# Patient Record
Sex: MALE | Race: WHITE | NOT HISPANIC OR LATINO | Employment: OTHER | URBAN - METROPOLITAN AREA
[De-identification: names, ages, dates, MRNs, and addresses within clinical notes are randomized per-mention and may not be internally consistent; named-entity substitution may affect disease eponyms.]

---

## 2017-01-13 ENCOUNTER — ALLSCRIPTS OFFICE VISIT (OUTPATIENT)
Dept: OTHER | Facility: OTHER | Age: 57
End: 2017-01-13

## 2017-01-13 DIAGNOSIS — E66.9 OBESITY: ICD-10-CM

## 2017-02-20 ENCOUNTER — ALLSCRIPTS OFFICE VISIT (OUTPATIENT)
Dept: OTHER | Facility: OTHER | Age: 57
End: 2017-02-20

## 2017-02-20 DIAGNOSIS — M10.9 GOUT: ICD-10-CM

## 2017-04-03 DIAGNOSIS — M10.9 GOUT: ICD-10-CM

## 2017-04-03 DIAGNOSIS — Z12.11 ENCOUNTER FOR SCREENING FOR MALIGNANT NEOPLASM OF COLON: ICD-10-CM

## 2017-04-12 ENCOUNTER — APPOINTMENT (OUTPATIENT)
Dept: LAB | Facility: CLINIC | Age: 57
End: 2017-04-12
Payer: COMMERCIAL

## 2017-04-12 DIAGNOSIS — M10.9 GOUT: ICD-10-CM

## 2017-04-12 LAB
ANION GAP SERPL CALCULATED.3IONS-SCNC: 6 MMOL/L (ref 4–13)
BUN SERPL-MCNC: 22 MG/DL (ref 5–25)
CALCIUM SERPL-MCNC: 9.2 MG/DL (ref 8.3–10.1)
CHLORIDE SERPL-SCNC: 111 MMOL/L (ref 100–108)
CO2 SERPL-SCNC: 29 MMOL/L (ref 21–32)
CREAT SERPL-MCNC: 1.2 MG/DL (ref 0.6–1.3)
GFR SERPL CREATININE-BSD FRML MDRD: >60 ML/MIN/1.73SQ M
GLUCOSE SERPL-MCNC: 92 MG/DL (ref 65–140)
POTASSIUM SERPL-SCNC: 3.8 MMOL/L (ref 3.5–5.3)
SODIUM SERPL-SCNC: 146 MMOL/L (ref 136–145)
URATE SERPL-MCNC: 5.4 MG/DL (ref 4.2–8)

## 2017-04-12 PROCEDURE — 80048 BASIC METABOLIC PNL TOTAL CA: CPT

## 2017-04-12 PROCEDURE — 36415 COLL VENOUS BLD VENIPUNCTURE: CPT

## 2017-04-12 PROCEDURE — 84550 ASSAY OF BLOOD/URIC ACID: CPT

## 2017-04-20 ENCOUNTER — ALLSCRIPTS OFFICE VISIT (OUTPATIENT)
Dept: OTHER | Facility: OTHER | Age: 57
End: 2017-04-20

## 2017-04-20 ENCOUNTER — APPOINTMENT (OUTPATIENT)
Dept: LAB | Facility: CLINIC | Age: 57
End: 2017-04-20
Payer: COMMERCIAL

## 2017-04-20 DIAGNOSIS — E66.9 OBESITY: ICD-10-CM

## 2017-04-20 LAB
CHOLEST SERPL-MCNC: 136 MG/DL (ref 50–200)
HDLC SERPL-MCNC: 39 MG/DL (ref 40–60)
LDLC SERPL CALC-MCNC: 80 MG/DL (ref 0–100)
TRIGL SERPL-MCNC: 86 MG/DL

## 2017-04-20 PROCEDURE — 80061 LIPID PANEL: CPT

## 2017-04-20 PROCEDURE — 36415 COLL VENOUS BLD VENIPUNCTURE: CPT

## 2017-04-24 ENCOUNTER — GENERIC CONVERSION - ENCOUNTER (OUTPATIENT)
Dept: OTHER | Facility: OTHER | Age: 57
End: 2017-04-24

## 2017-06-16 ENCOUNTER — ALLSCRIPTS OFFICE VISIT (OUTPATIENT)
Dept: OTHER | Facility: OTHER | Age: 57
End: 2017-06-16

## 2017-07-10 ENCOUNTER — ALLSCRIPTS OFFICE VISIT (OUTPATIENT)
Dept: OTHER | Facility: OTHER | Age: 57
End: 2017-07-10

## 2017-07-10 DIAGNOSIS — Z12.2 ENCOUNTER FOR SCREENING FOR MALIGNANT NEOPLASM OF RESPIRATORY ORGANS: ICD-10-CM

## 2018-01-10 NOTE — MISCELLANEOUS
Reason For Visit  Reason For Visit Free Text Note Form: Call later returned from pt to SW and he related he was able to get all of his medications  Active Problems    1  Abnormal LFTs (790 6) (R79 89)   2  CAD (coronary artery disease) (414 00) (I25 10)   3  COPD (chronic obstructive pulmonary disease) (496) (J44 9)   4  Dyspnea on exertion (786 09) (R06 09)   5  Hyperlipidemia (272 4) (E78 5)   6  Hypertension (401 9) (I10)   7  Need for influenza vaccination (V04 81) (Z23)   8  Need for pneumococcal vaccination (V03 82) (Z23)   9  Need for Tdap vaccination (V06 1) (Z23)   10  Obese (278 00) (E66 9)   11  Orthopnea (786 02) (R06 01)   12  QT prolongation (794 31) (R94 31)    Current Meds   1  AmLODIPine Besylate 10 MG Oral Tablet; TAKE 1 TABLET DAILY FOR BLOOD   PRESSURE; Therapy: 72NLD5516 to (Evaluate:84Zlt9808)  Requested for: 98Uhf8480; Last   Rx:27Dwz4436 Ordered   2  Aspirin 81 MG TABS; TAKE 1 TABLET DAILY; Therapy: 01PDY7109 to (Evaluate:60Ibv6278); Last Rx:29Jan2016 Ordered   3  Atorvastatin Calcium 40 MG Oral Tablet; TAKE 1 TABLET DAILY AS DIRECTED; Therapy: 81Miw5618 to (Evaluate:45Pfq4889)  Requested for: 79Qcf6950; Last   Rx:32Zil2095 Ordered   4  Doxazosin Mesylate 4 MG Oral Tablet; TAKE 1 TABLET DAILY; Therapy: 44DHN6192 to (Evaluate:14Jan2017)  Requested for: 72Wcc3789; Last   Rx:14Qhe5235 Ordered   5  Dulera 100-5 MCG/ACT Inhalation Aerosol; INHALE 2 PUFFS,  BY MOUTH, TWICE DAILY  RINSE MOUTH AFTER USE; Therapy: 33Ijo7107 to (Evaluate:67Qpf2686)  Requested for: 71Sam1160; Last   Rx:46Lvb4474 Ordered   6  Hydrochlorothiazide 25 MG Oral Tablet; TAKE 1 TABLET DAILY; Therapy: 79PRN5955 to (Evaluate:22Mtl7086)  Requested for: 10Esl3197; Last   Rx:56Nia1547 Ordered   7  Losartan Potassium 100 MG Oral Tablet; TAKE 1 TABLET DAILY; Last Rx:71Rts0699   Ordered   8  Losartan Potassium 100 MG Oral Tablet; TAKE 1 TABLET DAILY;    Therapy: 02Sep2016 to (Evaluate:01Dec2016)  Requested for: 22EGI9150; Last   Rx:02Sep2016 Ordered   9  Nitroglycerin 0 4 MG/SPRAY Translingual Solution; USE 1 SPRAY UNDER THE TONGUE   AS NEEDED FOR CHEST PAIN;   Therapy: 91NUF5575 to (Evaluate:16Oct2016)  Requested for: 17Aug2016; Last   Rx:17Aug2016 Ordered   10  Spiriva HandiHaler 18 MCG Inhalation Capsule; Therapy: 06SZS4661 to Recorded    Allergies    1   No Known Drug Allergies    Signatures   Electronically signed by : Guicho Levi LCSW; Sep 19 2016  1:07PM EST                       (Author)

## 2018-01-10 NOTE — MISCELLANEOUS
History of Present Illness  COPD Hospital Discharge Initial Follow-Up Call: The patient is being contacted for follow-up after hospitalization  The date of discharge is 1/24/16  I spoke with patient  The patient was discharged to home  The patient is experiencing the following symptoms: no wheezing, no cough, no dyspnea, no fever and not coughing up sputum  Zone tool status is yellow  The following topics were reviewed:  rescue vs  maintenance inhalers, smoking cessation, energy conservation, physician follow-ups and medication compliance  The patient felt ready to be discharged from the hospital  The patient was given written &/or verbal educational materials specific to his COPD diagnosis  The staff offered to assist the patient in making follow-up appointments with his family doctor or other specialists  Narrative Summary:  Follow-up call after hospital discharge  Patient has medication on hand and 2 appointments scheduled this week  He is committed to smoking cessation efforts  One medication not yet delivered to [address] [place] [address] to Home program initiated while patient in the hospital and should arrive today  Current Meds    1  Cephalexin 500 MG Oral Capsule; TAKE 1 CAPSULE 4 TIMES DAILY UNTIL GONE;   Therapy: 29JNG9022 to (Evaluate:88Mqv9309); Last Rx:25Nov2014 Ordered    2  AmLODIPine Besylate 5 MG Oral Tablet; Take 1 tablet twice daily; Therapy: 07YGQ0861 to (Evaluate:11Tyo0247); Last Rx:25Nov2014 Ordered   3  Lisinopril-Hydrochlorothiazide 20-25 MG Oral Tablet; TAKE 1 TABLET DAILY; Therapy: 56ELA1115 to (Evaluate:25Mar2015); Last Rx:25Nov2014 Ordered   4  Metoprolol Tartrate 100 MG Oral Tablet; TAKE 1 TABLET TWICE DAILY; Therapy: 86ZEP4969 to (Evaluate:24Jan2015);  Last Rx:25Nov2014 Ordered    Future Appointments    Date/Time Provider Specialty Site   01/28/2016 11:10 AM Praveena Roque 6 PCP   01/29/2016 10:30 AM Noemí Barbara Mercado MD Cardiology [address]     Signatures   Electronically signed by :  RT Sinai; Jan 25 2016  3:06PM EST                       (Author)

## 2018-01-11 NOTE — MISCELLANEOUS
Assessment    1  Current every day smoker (305 1) (F17 200)   2  COPD (chronic obstructive pulmonary disease) (496) (J44 9)   3  CAD (coronary artery disease) (414 00) (I25 10)   4  Obese (278 00) (E66 9)   5  Orthopnea (786 02) (R06 01)   6  QT prolongation (794 31) (R94 31)   7  Hypertension (401 9) (I10)   8  Hyperlipidemia (272 4) (E78 5)    Plan  CAD (coronary artery disease)    · Nitroglycerin 0 4 MG/SPRAY Translingual Solution; USE 1 SPRAY UNDER THE  TONGUE AS NEEDED FOR CHEST PAIN   Rx By: Yolanda Rodrigues; Dispense: 30 Days ; #:30 GM; Refill: 1; For: CAD (coronary artery disease); JEFF = N; Verified Transmission to [id number] Last Updated By: System, SureScripts; 8/17/2016 2:29:13 PM   · *1 - [address] Physician Referral  Consult  Status: Hold For - Scheduling   Requested for: 22CGR1358   Ordered; For: CAD (coronary artery disease); Ordered By: Yolanda Rodrigues Performed:  Due: 21OON8244  Care Summary provided  : Yes   · *1 - [facility] (INTERNAL [facility]ICINE ) Physician Referral   Consult  Status: Hold For - Scheduling  Requested for: 95BJK3701   Ordered; For: CAD (coronary artery disease); Ordered By: Yolanda Rodrigues Performed:  Due: 78RFY3010  Care Summary provided  : Yes  Hypertension    · Hydrochlorothiazide 25 MG Oral Tablet; TAKE 1 TABLET DAILY   Rx By: Yolanda Rodrigues; Dispense: 90 Days ; #:90 Tablet; Refill: 1; For: Hypertension; JEFF = N; Verified Transmission to [id number] Last Updated By: System, SureScripts; 8/17/2016 2:29:20 PM   · Lisinopril 40 MG Oral Tablet; Take 1 tablet daily   Rx By: Yolanda Rodrigues; Dispense: 90 Days ; #:90 Tablet; Refill: 1; For: Hypertension; JEFF = N; Verified Transmission to [id number] Last Updated By: System, SureScripts; 8/17/2016 2:28:45 PM   · AmLODIPine Besylate 10 MG Oral Tablet; TAKE 1 TABLET DAILY FOR BLOOD  PRESSURE   Rx By: Yolanda Rodrigues; Dispense: 90 Days ; #:90 Tablet;  Refill: 1; For: Hypertension; JEFF = N; Verified Transmission to [facility] #[id number] Last Updated By: System, Sadia; 8/17/2016 2:29:00 PM    Discussion/Summary  Discussion Summary:   I have refilled your meds  make sure to follow up here with financial counselor and [facility] Flores for the med program regarding your meds / Radha Singh  sched appt with Dr beauchamp as per D/C for ongoing care  sched the follow up with cardiology,   make sure to avoid all caffeine, salty foods, fast foods no deli meats  patient reports has 2 weeks of meds and no money to get refills now  Discussed importance of keeping appts here as we have a lot of resources to assist you with getting meds for free / discount and financial counselor to follow up n insurance  If you are not able to get your refills within next two weeks to call here and we will work on getting meds/ possible changing pharmacy to assist   Discuss if can continue working when establish care with Dr beauchamp  If you develop any chest pain/ worsening SOB go to ER  Counseling Documentation With Imm: The patient was counseled regarding diagnostic results, instructions for management, risk factor reductions, prognosis, patient and family education, impressions  Medication SE Review and Pt Understands Tx: Possible side effects of new medications were reviewed with the patient/guardian today  The treatment plan was reviewed with the patient/guardian  The patient/guardian understands and agrees with the treatment plan      Chief Complaint  Chief Complaint Free Text Note Form: Patient presents to the clinic for transition of care was IP 8/4/16 to 8/5/16  Was seen at the ER for COPD exacerbation, acute on chronic       History of Present Illness  Hospital Based Practices Required Assessment:   Pain Assessment   the patient states they do not have pain  The pain is located in the pt state he has no pain today  Prefered Language is  ENGLISH     Primary Language is  ENGLISH        HPI: He says he is doing better, but reports SOB on exertion  Has finished taking the prednisone  Sleeps on on 3-4 pillows nightly, and uses a CPAP for Hx of sleep apnea  Has a chronic of HTN, and BP at the clinic is 783 systolic  He reports it was more than 477 mmHg systolic this morning and diastolic was 476 mmHg   Review of his discharge records show he is not taking any meds for hyperlipidemia (used to be on crestor)  Works as a  and admits more difficult with his breathing  Did not schedule his stress test d/t financial reasons  Of note was seen here 1/2016 and by cardiology who wanted to sched him for card cath due to prior history and symptoms however patient never followed up  Beta blocker was stopped due to bradycardia      Review of Systems  Complete-Male:   Constitutional: feeling tired and fatigue mostly with exertion, but no fever and no chills  Eyes: no eye pain  ENT: no earache, no nosebleeds, no hearing loss, no nasal discharge and no hoarseness  Cardiovascular: the heart rate was not slow, no chest pain, the heart rate was not fast and no palpitations  Respiratory: shortness of breath, orthopnea, shortness of breath during exertion and secondary to H of chronic COPD  Gastrointestinal: no nausea, no vomiting, no constipation and no diarrhea  Integumentary: no rashes, no itching and no dry skin  Neurological: no headache, no dizziness and no fainting  Hematologic/Lymphatic: no swollen glands, no tendency for easy bleeding and no swollen glands in the neck  ROS Reviewed:   ROS reviewed  Active Problems    1  Abnormal LFTs (790 6) (R79 89)   2  CAD (coronary artery disease) (414 00) (I25 10)   3  COPD (chronic obstructive pulmonary disease) (496) (J44 9)   4  Dyspnea on exertion (786 09) (R06 09)   5  Hypertension (401 9) (I10)   6  Need for influenza vaccination (V04 81) (Z23)   7  Need for pneumococcal vaccination (V03 82) (Z23)   8  Need for Tdap vaccination (V06 1) (Z23)   9  Obese (278 00) (E66 9)   10  Orthopnea (786 02) (R06 01)   11  QT prolongation (794 31) (R94 31)    Past Medical History    1  History of Cellulitis of foot (682 7) (L03 119)   2  History of Pericardial effusion (423 9) (I31 3)    Family History  Mother    1  Family history of hypertension (V17 49) (Z82 49)  Father    2  Family history of diabetes mellitus (V18 0) (Z83 3)    Social History    ·    · Former smoker (V15 82) (S05 964)   · Three children   · Unemployed, not looking for work  Social History Reviewed: The social history was reviewed and updated today  Current Meds   1  AmLODIPine Besylate 10 MG Oral Tablet; TAKE 1 TABLET DAILY FOR BLOOD   PRESSURE; Therapy: 33MNL5361 to (Genevia Polo)  Requested for: 65CHM7603; Last   Rx:14Mar2016 Ordered   2  Aspirin 81 MG TABS; TAKE 1 TABLET DAILY; Therapy: 15IOP8550 to (Evaluate:96Vlz2919); Last Rx:29Jan2016 Ordered   3  Nitroglycerin 0 4 MG/SPRAY Translingual Solution; USE 1 SPRAY UNDER THE TONGUE   AS NEEDED FOR CHEST PAIN;   Therapy: 43NNA4163 to (Genevia Polo)  Requested for: 13NCQ8922; Last   Rx:29Jan2016 Ordered   4  Spiriva HandiHaler 18 MCG Inhalation Capsule; Therapy: 56OTZ2654 to Recorded    Allergies    1  No Known Drug Allergies    Vitals  Signs   Recorded: 03AUS5421 11:76PH   Systolic: 685  Diastolic: 88  Heart Rate: 80  Temperature: 98 3 F  Height: 5 ft 6 in  Weight: 212 lb 15 41 oz  BMI Calculated: 34 37  BSA Calculated: 2 05    Physical Exam    Constitutional   General appearance: No acute distress, well appearing and well nourished  appears healthy and obese  Ears, Nose, Mouth, and Throat   External inspection of ears and nose: Normal     Oropharynx: Normal with no erythema, edema, exudate or lesions  fair dentition  Pulmonary   Respiratory effort: No increased work of breathing or signs of respiratory distress  when talks for multiple sestences will note some SOB     Auscultation of lungs: Abnormal   Auscultation of the lungs revealed decreased breath sounds diffusely  no rales or crackles were heard bilaterally  no rhonchi  no wheezing  Cardiovascular   Auscultation of heart: Normal rate and rhythm, normal S1 and S2, without murmurs  Examination of extremities for edema and/or varicosities: Normal     Carotid pulses: Normal     Abdomen   Abdomen: Non-tender, no masses  The abdomen was obese  Bowel sounds were normal  The abdomen was soft and nontender  Musculoskeletal   Gait and station: Normal     Skin   Skin and subcutaneous tissue: Normal without rashes or lesions  Psychiatric   Orientation to person, place and time: Normal     Mood and affect: Normal          Provider Comments  Provider Comments:   really needs to get back on a cholesterol med again but pt admits will not get right now anyway due to no $       Attending Note  Collaborating Physician Note: Collaborating Physician: I agree with the Advanced Practitioner note  Agree with Advanced Practitioner Note Except: unsure why wasn't dc on statin  pt has been working with Darro[facility] Fleischer for med program       Future Appointments    Date/Time Provider Specialty Site   11/01/2016 03:20 PM Rosalia Blandon DO Internal Medicine [address] [place] [address] PCP   09/02/2016 09:45 AM Flaca Arambula MD Cardiology [address] [place] [address]     Signatures   Electronically signed by : GLORIA Alvarado;  Aug 17 2016  2:45PM EST                       (Author)    Electronically signed by : Kim Gutierrez DO; Aug 17 2016  3:07PM EST                       (Co-participant)

## 2018-01-11 NOTE — MISCELLANEOUS
History of Present Illness  COPD Hospital Discharge Initial Follow-Up Call: The patient is being contacted for follow-up after hospitalization  The date of discharge is 8/5/16  I spoke with patient  Patient was identified as medium risk for readmission per risk stratification  The patient is a 1 PPD smoker  The patient is experiencing the following symptoms: no wheezing, no cough, no dyspnea, no fever and not coughing up sputum  Zone tool status is yellow  The following topics were reviewed:  rescue vs  maintenance inhalers, energy conservation, physician follow-ups and medication compliance  Narrative Summary:  Follow-up call made  Patient in hospital one day for COPD  Reminded him to make clinic appt and complete medication assistance forms for proof of income  Patient will go to clinic tomorrow and get assistance to complete paperwork, as per phone call this AM from clinic  He plans to schedule follow-up appt while there  Also discussed activity limitations, smoking cessation and medication compliance  No SOB detected at this time  Current Meds    1  Aspirin 81 MG TABS; TAKE 1 TABLET DAILY; Therapy: 56RGT1707 to (Evaluate:28May2016); Last Rx:29Jan2016 Ordered   2  Crestor 10 MG Oral Tablet (Rosuvastatin Calcium); TAKE 1 TABLET AT BEDTIME; Therapy: 00WFH7650 to (Evaluate:28May2016); Last Rx:29Jan2016 Ordered   3  Metoprolol Tartrate 100 MG Oral Tablet; TAKE 1 TABLET EVERY 12 HOURS DAILY; Therapy: 67GMB1074 to (Evaluate:13May2016)  Requested for: 63EAC5995; Last   Rx:14Mar2016 Ordered   4  Nitroglycerin 0 4 MG/SPRAY Translingual Solution; USE 1 SPRAY UNDER THE TONGUE   AS NEEDED FOR CHEST PAIN;   Therapy: 12LYN6050 to (Faisal Diamond)  Requested for: 37WBN0170; Last   Rx:29Jan2016 Ordered    5  AmLODIPine Besylate 10 MG Oral Tablet; TAKE 1 TABLET DAILY FOR BLOOD   PRESSURE; Therapy: 26QHR3656 to (Myrna Mane)  Requested for: 57GWK5388; Last   Rx:14Mar2016 Ordered   6  Lisinopril-Hydrochlorothiazide 20-12 5 MG Oral Tablet; TAKE 2 TABLETS DAILY; Therapy: 09MLU4993 to (Evaluate:12Mar2017)  Requested for: 12DTV9264; Last   Rx:17Mar2016 Ordered    Allergies    1  No Known Drug Allergies    Signatures   Electronically signed by :  Alen Galeana, RT; Aug  8 2016  2:10PM EST                       (Author)

## 2018-01-11 NOTE — MISCELLANEOUS
Reason For Visit  Reason For Visit Free Text Note Form: F/U - call received from [facility] from the [facility] COPD program   Pt is still in need of assistance with obtaining his medications  Mirella Alberts from the Medicine Program and pt does have his Inhaler  Pt relates he is now taking his older combo lisinopril HTCZ because he has NO money for any other medications  This medication may last for about 6 more days  SW has called CHAN Juares [phone] she now relates pt's MA application has been submitted and the updated info required received this date and will be submitted as well however no decision known at this time  SW spoke with his [facility] and his medications will cost approximately $77 00  CM to check if any other assistance available  Pt relates he can no longer work  He has applied for SSI/SSD last week  Pt further relates he could not do his cardiac stress test today because they refused him due to his present condition  CM to follow and assist as indicated  Active Problems    1  Abnormal LFTs (790 6) (R79 89)   2  CAD (coronary artery disease) (414 00) (I25 10)   3  COPD (chronic obstructive pulmonary disease) (496) (J44 9)   4  Dyspnea on exertion (786 09) (R06 09)   5  Hyperlipidemia (272 4) (E78 5)   6  Hypertension (401 9) (I10)   7  Need for influenza vaccination (V04 81) (Z23)   8  Need for pneumococcal vaccination (V03 82) (Z23)   9  Need for Tdap vaccination (V06 1) (Z23)   10  Obese (278 00) (E66 9)   11  Orthopnea (786 02) (R06 01)   12  QT prolongation (794 31) (R94 31)    Current Meds   1  AmLODIPine Besylate 10 MG Oral Tablet; TAKE 1 TABLET DAILY FOR BLOOD   PRESSURE; Therapy: 03DMN6644 to (Evaluate:06Jpe4767)  Requested for: 17Aug2016; Last   Rx:17Aug2016 Ordered   2  Aspirin 81 MG TABS; TAKE 1 TABLET DAILY; Therapy: 92KGN4851 to (Evaluate:93Lzl5095); Last Rx:29Jan2016 Ordered   3  Hydrochlorothiazide 25 MG Oral Tablet; TAKE 1 TABLET DAILY;    Therapy: 04SUN2902 to (Evaluate:88Ufk4369)  Requested for: 17Aug2016; Last   Rx:17Aug2016 Ordered   4  Lisinopril 40 MG Oral Tablet; Take 1 tablet daily; Therapy: 00SRV2794 to (Evaluate:15Tzp5842)  Requested for: 17Aug2016; Last   Rx:17Aug2016 Ordered   5  Nitroglycerin 0 4 MG/SPRAY Translingual Solution; USE 1 SPRAY UNDER THE TONGUE   AS NEEDED FOR CHEST PAIN;   Therapy: 22PGP7584 to (Evaluate:16Oct2016)  Requested for: 17Aug2016; Last   Rx:17Aug2016 Ordered   6  Spiriva HandiHaler 18 MCG Inhalation Capsule; Therapy: 81GAB3849 to Recorded    Allergies    1   No Known Drug Allergies    Signatures   Electronically signed by : Ok Goodwin LCSW; Aug 29 2016  4:40PM EST                       (Author)

## 2018-01-11 NOTE — PROGRESS NOTES
Assessment  Assessed    1  History of Pericardial effusion (423 9) (I31 9)   2  CAD (coronary artery disease) (414 00) (I25 10)   3  Dyspnea on exertion (786 09) (R06 09)   4  COPD (chronic obstructive pulmonary disease) (496) (J44 9)   5  QT prolongation (794 31) (I45 81)    Plan  CAD (coronary artery disease)    · Aspirin 81 MG Oral Tablet; TAKE 1 TABLET DAILY   Rx By: Neo Bhagat; Dispense: 30 Days ; #:30 Tablet; Refill: 3; For: CAD (coronary artery disease); JEFF = N; Print Rx   · Crestor 10 MG Oral Tablet; TAKE 1 TABLET AT BEDTIME   Rx By: Neo Bhagat; Dispense: 30 Days ; #:30 Tablet; Refill: 3; For: CAD (coronary artery disease); JEFF = N; Print Rx   · Metoprolol Tartrate 100 MG Oral Tablet; TAKE 1 TABLET EVERY 12 HOURS DAILY   Rx By: Neo Bhagat; Dispense: 30 Days ; #:60 Tablet; Refill: 0; For: CAD (coronary artery disease); JEFF = N; Verified Transmission to [facility] #[id number] Last Updated By: System, SureScripts; 1/29/2016 11:30:10 AM   · Nitroglycerin 0 4 MG/SPRAY Translingual Solution; USE 1 SPRAY UNDER THE  TONGUE AS NEEDED FOR CHEST PAIN   Rx By: Noe Bhagat; Dispense: 30 Days ; #:30 GM; Refill: 1; For: CAD (coronary artery disease); JEFF = N; Verified Transmission to [facility] #[id number] Last Updated By: System, SureScripts; 1/29/2016 11:30:10 AM   · CARDIAC CATHETERIZATION; Status:Need Information - Financial Authorization; Requested for:25Beh9027;    Perform:[facility]; VAX:[id number];UNERZCI; For:CAD (coronary artery disease); Ordered By:Randall Dowd;  QT prolongation    · EKG/ECG- POC; Status:Active; Requested UPV:[id number];    Perform: In Office; AJR:[id number];QZRRHUK;   For:QT prolongation; Ordered By:Nicki Dowd;    Discussion/Summary  Cardiology Discussion Summary Free Text Note Form [facility]:   60year old man with a past medical history of COPD with recent exacerbation, hypertension, tobacco abuse and moderate pericardial effusion presents to clinic for follow up of prolonged QTc and noted to have typical chest pain on exertion  # Typical Chest Pain:  -- Mr Jorge Xavier describes experiencing typical chest pain on exertion (e g climbing stairs) lasting 10 minutes which is relieved with rest  Episodes started 1 year ago  -- He would need a cardiac catheterization for further evaluation of coronary arteries given the typical nature of chest pain and presence of risk factors  Procedures and its risk of complications were explained to the patient who agrees to go ahead with the procedure  -- He has financial issues for which he is working with case management  Patient states he will call to schedule cardiac cath on Monday Feb 1st once he has financial assistance  -- Will start him on ASA  Also start rosuvastatin for CAD and to decrease risk of contrast nephropathy from cardiac catheterization  # QTc prolongation:  -- Noted to have EKG prolongation on hospital EKG  EKG done in office today shows a QTc of 407  -- Denies history of lightheadedness, dizziness, syncope or a family history of premature CAD/sudden death  # History of moderate pericardial effusion  -- Will repeat echocardiogram and evaluate  No symptoms at this point  # Hypertension:  -- Cont with Lisinopril-HCTZ and amlodipine  Would add back metoprolol tartrate 100 mg bid  -- Encourage to adhere to a low salt DASH diet  # Tobacco Abuse:  -- Encourage cessation  He is eager to quit and has cut down to 2 cigarettes per day  Counseling Documentation With Imm: The patient, patient's family was counseled regarding diagnostic results, instructions for management, risk factor reductions, prognosis, patient and family education, impressions, risks and benefits of treatment options, importance of compliance with treatment        Chief Complaint  Chief Complaint Free Text Note Form: New patient - QTc prolongation      History of Present Illness  Cardiology HPI Free Text Note Form [facility]: 60year old man with a past medical history of COPD with recent exacerbation, hypertension, and moderate pericardial effusion presents to clinic for follow up of prolonged QTc  He was recently admitted with COPD execration  EKG was noted to have QTc prolongation  His azithromycin was subsequently stopped post discharge and he presents to clinic today for follow up on QTC  He reports having increasing SOB and chest pain on exertion  Describes a sensation of chest tightness on climbing stairs  Relieved with rest  Episodes last approximately 10 minutes  Started approximately one year ago  Denies diaphoresis, lightheadedness, dizziness, palpitations  Had a nuclear stress test > 2 years ago at [facility]  Reportedly normal      States he stopped taking his metoprolol yesterday  PCP called and stopped it for lower limb edema  No orthopnea, paroxysmal nocturnal dyspnea  Is scheduled for an echocardiogram as per primary care  Has financial issues and is looking for financial support with health management  Is in touch with case management from [facility]  Of note, noted to have a moderate pericardial effusion in 3/2014  Hospital Based Practices Required Assessment:   Pain Assessment   the patient states they do not have pain  (on a scale of 0 to 10, the patient rates the pain at 0 )       Review of Systems  Cardiology Male ROS:     Cardiac: chest pain and has swelling in the , but no rhythm problems and no syncope/fainting  Skin: no rashes seen   Genitourinary: no recurrent urinary tract infections and no frequent urination at night   Psychological: no depression, no anxiety and no palpitations present  General: no trouble sleeping, no appetite changes, no changes in weight and no lack of energy/fatigue  Respiratory: wheezing, but no shortness of breath, no cough/sputum and no phlegm     HEENT: snoring, but no hearing problems   Gastrointestinal: no nausea, no vomiting and no diarrhea   Hematologic: no anemia Neurological: no numbness, no tingling, no weakness, no headaches and no dizziness   Musculoskeletal: no swelling/pain   ROS Reviewed:   ROS reviewed  Active Problems  Problems    1  Abnormal LFTs (790 6) (R79 89)   2  COPD (chronic obstructive pulmonary disease) (496) (J44 9)   3  Dyspnea on exertion (786 09) (R06 09)   4  Hypertension (401 9) (I10)   5  Need for influenza vaccination (V04 81) (Z23)   6  Need for pneumococcal vaccination (V03 82) (Z23)   7  Need for Tdap vaccination (V06 1) (Z23)   8  Obese (278 00) (E66 9)   9  Orthopnea (786 02) (R06 01)   10  QT prolongation (794 31) (I45 81)    Past Medical History  Problems    1  History of Cellulitis of foot (682 7) (L03 119)   2  History of Pericardial effusion (423 9) (I31 9)  Active Problems And Past Medical History Reviewed: The active problems and past medical history were reviewed and updated today  Surgical History  Surgical History Reviewed: The surgical history was reviewed and updated today  Family History  Mother    1  Family history of hypertension (V17 49) (Z82 49)  Father    2  Family history of diabetes mellitus (V18 0) (Z83 3)  Family History Reviewed: The family history was reviewed and updated today  Social History  Problems    ·    · Former smoker (B78 79) (S14 061)   · Three children   · Unemployed, not looking for work  Social History Reviewed: The social history was reviewed and updated today  The social history was reviewed and is unchanged  Current Meds   1  AmLODIPine Besylate 10 MG Oral Tablet; TAKE 1 TABLET DAILY FOR BLOOD   PRESSURE; Therapy: 53FST5220 to (Sharon Flores)  Requested for: 53REH4711; Last   Rx:28Jan2016 Ordered   2  Lisinopril-Hydrochlorothiazide 20-12 5 MG Oral Tablet; Take 1 tablet daily; Therapy: 94FLK1532 to (Sharon Flores)  Requested for: 92HRT8508; Last   Rx:28Jan2016 Ordered  Medication List Reviewed: The medication list was reviewed and updated today  Allergies  Medication    1  No Known Drug Allergies    Vitals  Vital Signs [Data Includes: Current Encounter]    Recorded: 61UCV6447 10:27AM   Temperature 98 2 F   Heart Rate 84   Systolic 976   Diastolic 94   Height 5 ft 6 in   Weight 213 lb 13 52 oz   BMI Calculated 34 52   BSA Calculated 2 06     Physical Exam    Constitutional   General appearance: No acute distress, well appearing and well nourished  Eyes   Conjunctiva and Sclera examination: Conjunctiva pink, sclera anicteric  Ears, Nose, Mouth, and Throat - External inspection of ears and nose: Normal without deformities or discharge  Neck   Neck and thyroid: Normal, supple, trachea midline, no thyromegaly  Pulmonary   Respiratory effort: No increased work of breathing or signs of respiratory distress  Auscultation of lungs: Clear to auscultation, no rales, no rhonchi, no wheezing, good air movement  Cardiovascular   Auscultation of heart: Normal rate and rhythm, normal S1 and S2, no murmurs  Pedal pulses: Normal, 2+ bilaterally  Examination of extremities for edema and/or varicosities: Abnormal   nonpitting edema present  varicositiess noted on the left  Chest - Chest: Normal    Abdomen   Abdomen: Non-tender and no distention  Musculoskeletal Gait and station: Normal gait  Skin - Skin and subcutaneous tissue: Normal without rashes or lesions  Skin is warm and well perfused, normal turgor  Neurologic - Cranial nerves: II - XII intact  Psychiatric - Orientation to person, place, and time: Normal  Mood and affect: Normal       Results/Data  ECG Report:   Rhythm and rate:  normal sinus rhythm  QRS: incomplete left bundle branch block QTC calculated as 407      Attending Note   I evaluated and discussed the patient with Dr Rachelle Mitchell and agree with his assessment and plan          Future Appointments    Date/Time Provider Specialty Site   05/26/2016 08:50 AM David Chen, DO Internal Medicine [facility] [address] [place] [address] PCP   03/04/2016 09:45 AM Dannielle Amaral MD Cardiology [place] [address]     Signatures   Electronically signed by : Jerod Elizabeth MD; Jan 29 2016 11:58AM EST                       (Author)    Electronically signed by : Koby Henderson MD; Feb 2 2016 11:31AM EST                       (Author)

## 2018-01-11 NOTE — MISCELLANEOUS
Reason For Visit  Reason For Visit Free Text Note Form: Medication assistance/insurance application  Contacted by Kristan Phoenix x [phone] regarding patient need for medications and insurance  Patient aware that assistance was received during his hospital stay [8/5/2016 discharge]  EPIC inpatient notes reviewed, printed and provided to Kristan Phoenix  Patient is being followed by Christian Coates, the COPD Care Coordinator to follow through on medication assistance plans and provided hospital to home medication for him on 8/6/2016  PATHS is involved and a Medicaid application has been initiated  Active Problems    1  Abnormal LFTs (790 6) (R79 89)   2  CAD (coronary artery disease) (414 00) (I25 10)   3  COPD (chronic obstructive pulmonary disease) (496) (J44 9)   4  Dyspnea on exertion (786 09) (R06 09)   5  Hyperlipidemia (272 4) (E78 5)   6  Hypertension (401 9) (I10)   7  Need for influenza vaccination (V04 81) (Z23)   8  Need for pneumococcal vaccination (V03 82) (Z23)   9  Need for Tdap vaccination (V06 1) (Z23)   10  Obese (278 00) (E66 9)   11  Orthopnea (786 02) (R06 01)   12  QT prolongation (794 31) (R94 31)    Current Meds   1  AmLODIPine Besylate 10 MG Oral Tablet; TAKE 1 TABLET DAILY FOR BLOOD   PRESSURE; Therapy: 69CNE8179 to (Evaluate:23Xfy0270)  Requested for: 17Aug2016; Last   Rx:17Aug2016 Ordered   2  Aspirin 81 MG TABS; TAKE 1 TABLET DAILY; Therapy: 67SHK8725 to (Evaluate:62Kns0575); Last Rx:29Jan2016 Ordered   3  Hydrochlorothiazide 25 MG Oral Tablet; TAKE 1 TABLET DAILY; Therapy: 81GCC9563 to (Evaluate:18Tun5283)  Requested for: 17Aug2016; Last   Rx:17Aug2016 Ordered   4  Lisinopril 40 MG Oral Tablet; Take 1 tablet daily; Therapy: 97XAF3224 to (Evaluate:88Oao0747)  Requested for: 17Aug2016; Last   Rx:17Aug2016 Ordered   5   Nitroglycerin 0 4 MG/SPRAY Translingual Solution; USE 1 SPRAY UNDER THE TONGUE   AS NEEDED FOR CHEST PAIN;   Therapy: 47KLQ8410 to (Evaluate:16Oct2016)  Requested for: 17Aug2016; Last   Rx:41Udb3139 Ordered   6  Spiriva HandiHaler 18 MCG Inhalation Capsule; Therapy: 95MDQ5436 to Recorded    Allergies    1   No Known Drug Allergies    Signatures   Electronically signed by : TIERNEY Francis; Aug 24 2016 10:42AM EST                       (Author)

## 2018-01-12 VITALS
WEIGHT: 233.69 LBS | BODY MASS INDEX: 37.56 KG/M2 | HEIGHT: 66 IN | DIASTOLIC BLOOD PRESSURE: 78 MMHG | HEART RATE: 86 BPM | SYSTOLIC BLOOD PRESSURE: 140 MMHG | TEMPERATURE: 98.3 F

## 2018-01-12 VITALS
BODY MASS INDEX: 37.99 KG/M2 | TEMPERATURE: 97.9 F | DIASTOLIC BLOOD PRESSURE: 82 MMHG | HEIGHT: 67 IN | WEIGHT: 242.06 LBS | HEART RATE: 72 BPM | SYSTOLIC BLOOD PRESSURE: 132 MMHG

## 2018-01-12 NOTE — MISCELLANEOUS
Reason For Visit  Reason For Visit Free Text Note Form: assistance with obtaining Insurance and Medications     Case Management Documentation [facility]:   Information obtained from the patient and medical record  Patient's financial status unemployed and no medical insurance  He is also dealing with additional issues such as chronic/terminal disease and COPD  Action Plan: [facility]/PATHS/Medicine Program/[facility] Assistance  plan reviewed  Progress Note  ZEYNEP met wit this 59 y/o male pt this date to assist pt with obtaining Insurance and medications  Pt is working with [facility] and has an appointment with them for Thursday  Pt also scheduled for a test tomorrow  Zeynep encouraged pt to f/u with same  Sw has reviewed with pt [facility]/ MA /[facility]IR[facility]/ [facility] and the Medicine Program  Pt states he has NO money for hi medications  Pt 's S?O confirms same  Pt has obtained his Inhalers through the [employer]    SW has asked Alexsander Ross of the [employer] [place] [facility] Program to assist with future refills and with lower cost mail order options for other medications  ZEYNEP has also gotten tentative CM approval to help pt get his Blood pressure Medications one time only  CM to F/U with pt/[facility] re same  Active Problems    1  Abnormal LFTs (790 6) (R79 89)   2  CAD (coronary artery disease) (414 00) (I25 10)   3  COPD (chronic obstructive pulmonary disease) (496) (J44 9)   4  Dyspnea on exertion (786 09) (R06 09)   5  Hypertension (401 9) (I10)   6  Need for influenza vaccination (V04 81) (Z23)   7  Need for pneumococcal vaccination (V03 82) (Z23)   8  Need for Tdap vaccination (V06 1) (Z23)   9  Obese (278 00) (E66 9)   10  Orthopnea (786 02) (R06 01)   11  QT prolongation (794 31) (I45 81)    Current Meds   1  AmLODIPine Besylate 10 MG Oral Tablet; TAKE 1 TABLET DAILY FOR BLOOD   PRESSURE; Therapy: 57XOK3307 to (Dionte Ruffin)  Requested for: 53HTK2340; Last   Rx:28Jan2016 Ordered   2   Aspirin 81 MG Oral Tablet; TAKE 1 TABLET DAILY; Therapy: 55FXA0057 to (Evaluate:28May2016); Last Rx:29Jan2016 Ordered   3  Crestor 10 MG Oral Tablet; TAKE 1 TABLET AT BEDTIME; Therapy: 17NZS4613 to (Evaluate:28May2016); Last Rx:29Jan2016 Ordered   4  Lisinopril-Hydrochlorothiazide 20-12 5 MG Oral Tablet; Take 1 tablet daily; Therapy: 28XHN4813 to (Berenice Jacob)  Requested for: 61QZB8151; Last   Rx:28Jan2016 Ordered   5  Metoprolol Tartrate 100 MG Oral Tablet; TAKE 1 TABLET EVERY 12 HOURS DAILY; Therapy: 95YGN4315 to (Melchor Thomas)  Requested for: 07FCN4766; Last   Rx:29Jan2016 Ordered   6  Nitroglycerin 0 4 MG/SPRAY Translingual Solution; USE 1 SPRAY UNDER THE TONGUE   AS NEEDED FOR CHEST PAIN;   Therapy: 72NEX2088 to (Evaluate:29Mar2016)  Requested for: 87DRQ2854; Last   Rx:29Jan2016 Ordered    Allergies    1   No Known Drug Allergies    Future Appointments    Date/Time Provider Specialty Site   05/26/2016 08:50 AM Angelica Irby DO Internal Medicine [address] PCP   03/04/2016 09:45 AM Sunil Alberto MD Cardiology [address]     Signatures   Electronically signed by : Sheri Prescott LCSW; Feb 2 2016 11:44AM EST                       (Author)

## 2018-01-13 VITALS
HEIGHT: 67 IN | BODY MASS INDEX: 38.27 KG/M2 | HEART RATE: 68 BPM | DIASTOLIC BLOOD PRESSURE: 80 MMHG | SYSTOLIC BLOOD PRESSURE: 130 MMHG | WEIGHT: 243.83 LBS | TEMPERATURE: 97.8 F

## 2018-01-13 VITALS
TEMPERATURE: 97.8 F | DIASTOLIC BLOOD PRESSURE: 84 MMHG | HEART RATE: 72 BPM | HEIGHT: 66 IN | BODY MASS INDEX: 38.34 KG/M2 | WEIGHT: 238.54 LBS | SYSTOLIC BLOOD PRESSURE: 148 MMHG

## 2018-01-14 NOTE — RESULT NOTES
Verified Results  (1) URIC ACID 61AME7484 09:50AM Daylin Fails Order Number: [id number]     Test Name Result Flag Reference   URIC ACID 6 6 mg/dL  4 2-8 0   Specimen collection should occur prior to Metamizole administration due to the potential for falsely depressed results

## 2018-01-15 VITALS
SYSTOLIC BLOOD PRESSURE: 140 MMHG | HEIGHT: 67 IN | TEMPERATURE: 97.9 F | DIASTOLIC BLOOD PRESSURE: 86 MMHG | HEART RATE: 68 BPM | BODY MASS INDEX: 37.65 KG/M2 | WEIGHT: 239.86 LBS

## 2018-01-17 NOTE — MISCELLANEOUS
Message  1- Prescription for Ventolin HFA was faxed to [address] [place] [address] patient assist program for a refill  Ventolin HFA will be shipped to patient's home  Patient is aware  2- Patient states he was able to  Spiriva through his prescription insurance recently  Active Problems    1  Abnormal LFTs (790 6) (R79 89)   2  Allergic rhinitis (477 9) (J30 9)   3  Colon cancer screening (V76 51) (Z12 11)   4  COPD (chronic obstructive pulmonary disease) (496) (J44 9)   5  Dyspnea on exertion (786 09) (R06 09)   6  Gout (274 9) (M10 9)   7  Gout, arthritis (274 00) (M10 9)   8  Hyperlipidemia (272 4) (E78 5)   9  Hypertension (401 9) (I10)   10  Obese (278 00) (E66 9)   11  QT prolongation (794 31) (R94 31)    Current Meds   1  Allopurinol 100 MG Oral Tablet; TAKE 2 TABLETS DAILY; Therapy: 36FRB3593 to (Evaluate:94Xnl1846)  Requested for: 95Xxf2949; Last   Rx:13Vxn1871 Ordered   2  AmLODIPine Besylate 10 MG Oral Tablet; TAKE 1 TABLET DAILY FOR BLOOD   PRESSURE; Therapy: 51OSK7534 to (Evaluate:44Dzf5771)  Requested for: 12YWA3111; Last   Rx:23Nov2016 Ordered   3  Aspirin 81 MG Oral Tablet Delayed Release; TAKE 1 TABLET BY MOUTH ONCE A DAY; Therapy: 45XKI7124 to (Evaluate:07Ixs3594)  Requested for: 85CLL8906; Last   Rx:44Ofg8214 Ordered   4  Atorvastatin Calcium 40 MG Oral Tablet; TAKE 1 TABLET DAILY AS DIRECTED; Therapy: 98Esi6812 to (Evaluate:41Ymb2263)  Requested for: 31SSO9788; Last   Rx:22Nov2016 Ordered   5  Doxazosin Mesylate 4 MG Oral Tablet; TAKE 1 TABLET DAILY; Therapy: 21HEJ3916 to (Evaluate:39Qjc7807)  Requested for: 14Tbe0262; Last   Rx:20Cuy2077; Status: ACTIVE - Retrospective Authorization Ordered   6  Dulera 100-5 MCG/ACT Inhalation Aerosol; INHALE 2 PUFFS TWICE A DAY RINSE   MOUTH AFTER USE; Therapy: 55Cid3259 to (Last Rx:20Apr2017)  Requested for: 20Apr2017 Ordered   7  Loratadine 10 MG Oral Tablet; TAKE 1 TABLET AT BEDTIME;    Therapy: 20Apr2017 to (Evaluate:55Tas8008)  Requested for: 20Apr2017; Last   Rx:06Dai8356 Ordered   8  Losartan Potassium 100 MG Oral Tablet; TAKE 1 TABLET DAILY; Therapy: 41Now6351 to ( [place])  Requested for: 79EEF2699; Last   Rx:55Kkl5852 Ordered   9  Naproxen 500 MG Oral Tablet; take 1 tablet every 12 hours with food as needed; Therapy: 34WJO0521 to (Evaluate:47Hrg2105)  Requested for: 14Brx7836; Last   Rx:87Meq9245 Ordered   10  Nitroglycerin 0 4 MG/SPRAY Translingual Solution; USE 1 SPRAY UNDER THE TONGUE    AS NEEDED FOR CHEST PAIN;    Therapy: 37MCY3837 to (Evaluate:69Wty8399)  Requested for: 36Mha8242; Last    Rx:58Ton1596 Ordered   11  PredniSONE 10 MG Oral Tablet; TAKE 1 TABLET DAILY; Therapy: 24Hme0823 to (Evaluate:29Apt1607)  Requested for: 53Ont5574; Last    Rx:72Fra3903 Ordered   12  Spiriva HandiHaler 18 MCG Inhalation Capsule; INHALE CONTENTS OF 1 CAPSULE    ONCE DAILY; Therapy: 91NFF8900 to (Evaluate:63Ese9784)  Requested for: 20Apr2017; Last    Rx:64Jcm6811 Ordered   13  Ventolin  (90 Base) MCG/ACT Inhalation Aerosol Solution; INHALE 2 PUFFS    EVERY 4 (FOUR) HOURS AS NEEDED FOR WHEEZING FOR UP TO 30 DAYS; Therapy: 59NMN7567 to (Last Rx:20Apr2017)  Requested for: 20Apr2017 Ordered    Allergies    1   No Known Drug Allergies    Signatures   Electronically signed by : Garcia Mota, ; Apr 24 2017  9:19AM EST                       (Author)

## 2018-01-18 NOTE — MISCELLANEOUS
Reason For Visit  Reason For Visit Free Text Note Form: Reportedly pt has been approved for his MA  Call made to pt to confirm he was able to get his medications however, SW had to leave a message  SW to remain available to assist as indicated  Active Problems    1  Abnormal LFTs (790 6) (R79 89)   2  CAD (coronary artery disease) (414 00) (I25 10)   3  COPD (chronic obstructive pulmonary disease) (496) (J44 9)   4  Dyspnea on exertion (786 09) (R06 09)   5  Hyperlipidemia (272 4) (E78 5)   6  Hypertension (401 9) (I10)   7  Need for influenza vaccination (V04 81) (Z23)   8  Need for pneumococcal vaccination (V03 82) (Z23)   9  Need for Tdap vaccination (V06 1) (Z23)   10  Obese (278 00) (E66 9)   11  Orthopnea (786 02) (R06 01)   12  QT prolongation (794 31) (R94 31)    Current Meds   1  AmLODIPine Besylate 10 MG Oral Tablet; TAKE 1 TABLET DAILY FOR BLOOD   PRESSURE; Therapy: 16STT9120 to (Evaluate:61Iuc1890)  Requested for: 17Aug2016; Last   Rx:06Yua2883 Ordered   2  Aspirin 81 MG TABS; TAKE 1 TABLET DAILY; Therapy: 68MGG5939 to (Evaluate:30Xux6796); Last Rx:29Jan2016 Ordered   3  Atorvastatin Calcium 40 MG Oral Tablet; TAKE 1 TABLET DAILY AS DIRECTED; Therapy: 32Rce9296 to (Evaluate:22Hgr8295)  Requested for: 31Zca9376; Last   Rx:34Bat8621 Ordered   4  Hydrochlorothiazide 25 MG Oral Tablet; TAKE 1 TABLET DAILY; Therapy: 80GSA1308 to (Evaluate:03Zgg5483)  Requested for: 02Lut8326; Last   Rx:04Aez7476 Ordered   5  Losartan Potassium 100 MG Oral Tablet; TAKE 1 TABLET DAILY; Last Rx:34Ccn2017   Ordered   6  Losartan Potassium 100 MG Oral Tablet; TAKE 1 TABLET DAILY; Therapy: 75Csr5099 to (Evaluate:13Qsz8755)  Requested for: 75Ioh9789; Last   Rx:51Yno6871 Ordered   7  Nitroglycerin 0 4 MG/SPRAY Translingual Solution; USE 1 SPRAY UNDER THE TONGUE   AS NEEDED FOR CHEST PAIN;   Therapy: 06XTJ0301 to (Evaluate:16Oct2016)  Requested for: 17Aug2016; Last   Rx:50Oci3306 Ordered   8   Spiriva HandiHaler 18 MCG Inhalation Capsule; Therapy: 44HFA9165 to Recorded    Allergies    1   No Known Drug Allergies    Signatures   Electronically signed by : Leticia Patrick LCSW; Sep  7 2016  1:19PM EST                       (Author)

## 2018-01-18 NOTE — MISCELLANEOUS
Message  [facility] pt  assist program application to get free Advair and Ventolin was faxed to pharmaceutical company on 2/4/16 Company rep states pt  should call himself to [facility] status of application as pt  advocate listed on application is from [facility] staff and not from [address] [place] [address]  I have left message on pt 's voice mail to fup with [facility] and left pharmaceutical company's phone number on voice mail  Active Problems    1  Abnormal LFTs (790 6) (R79 89)   2  CAD (coronary artery disease) (414 00) (I25 10)   3  COPD (chronic obstructive pulmonary disease) (496) (J44 9)   4  Dyspnea on exertion (786 09) (R06 09)   5  Hypertension (401 9) (I10)   6  Need for influenza vaccination (V04 81) (Z23)   7  Need for pneumococcal vaccination (V03 82) (Z23)   8  Need for Tdap vaccination (V06 1) (Z23)   9  Obese (278 00) (E66 9)   10  Orthopnea (786 02) (R06 01)   11  QT prolongation (794 31) (I45 81)    Current Meds   1  AmLODIPine Besylate 10 MG Oral Tablet; TAKE 1 TABLET DAILY FOR BLOOD   PRESSURE; Therapy: 27KDZ4514 to (Essie Ortiz)  Requested for: 02Feb2016; Last   Rx:02Feb2016 Ordered   2  Aspirin 81 MG Oral Tablet; TAKE 1 TABLET DAILY; Therapy: 04PVI5353 to (Evaluate:41Bfi5325); Last Rx:29Jan2016 Ordered   3  Crestor 10 MG Oral Tablet; TAKE 1 TABLET AT BEDTIME; Therapy: 49MYH8197 to (Evaluate:33Kcf6095); Last Rx:29Jan2016 Ordered   4  Lisinopril-Hydrochlorothiazide 20-12 5 MG Oral Tablet; TAKE 2 TABLETS DAILY; Therapy: 38ZYX9746 to (9593 5021)  Requested for: 87WID8286; Last   Rx:02Feb2016 Ordered   5  Metoprolol Tartrate 100 MG Oral Tablet; TAKE 1 TABLET EVERY 12 HOURS DAILY; Therapy: 58UPD2135 to (Mk Smith)  Requested for: 71UFC5440; Last   Rx:29Jan2016 Ordered   6   Nitroglycerin 0 4 MG/SPRAY Translingual Solution; USE 1 SPRAY UNDER THE TONGUE   AS NEEDED FOR CHEST PAIN;   Therapy: 90PXW6819 to (Ciaran Blandon)  Requested for: 42WRV2630; Last Rx:76Zfs9426 Ordered    Allergies    1   No Known Drug Allergies    Signatures   Electronically signed by : Suki Acosta, ; Feb 17 2016  9:29AM EST                       (Author)

## 2018-01-18 NOTE — MISCELLANEOUS
Message  1- Called pt  to inform him status of Spiriva  Patient is approved for free Spiriva, through pt  assist program       2- From pt 's current med list, no other medicine is available through the free program  Patient does not know which heart medicine he needs  He will discuss medications at his Cardio appt on 9/2/16  I will fup with patient after his appt  regarding any new prescriptions  3- Per Leonides Lipscomb  (MONIK) according to doctor's notes; pt will not be needing Ventolin HFA or Advair at this time  Patient is not approved for Advair and Ventolin through [facility] pt  assist program      4- Miroslava GEORGE  (ZEYNEP) will fup with PATHS and get in touch with patient  Active Problems    1  Abnormal LFTs (790 6) (R79 89)   2  CAD (coronary artery disease) (414 00) (I25 10)   3  COPD (chronic obstructive pulmonary disease) (496) (J44 9)   4  Dyspnea on exertion (786 09) (R06 09)   5  Hyperlipidemia (272 4) (E78 5)   6  Hypertension (401 9) (I10)   7  Need for influenza vaccination (V04 81) (Z23)   8  Need for pneumococcal vaccination (V03 82) (Z23)   9  Need for Tdap vaccination (V06 1) (Z23)   10  Obese (278 00) (E66 9)   11  Orthopnea (786 02) (R06 01)   12  QT prolongation (794 31) (R94 31)    Current Meds   1  AmLODIPine Besylate 10 MG Oral Tablet; TAKE 1 TABLET DAILY FOR BLOOD   PRESSURE; Therapy: 63ZTF0198 to (Evaluate:73Bhd0753)  Requested for: 17Aug2016; Last   Rx:17Aug2016 Ordered   2  Aspirin 81 MG TABS; TAKE 1 TABLET DAILY; Therapy: 08ECT9745 to (Evaluate:04Mhd8022); Last Rx:29Jan2016 Ordered   3  Hydrochlorothiazide 25 MG Oral Tablet; TAKE 1 TABLET DAILY; Therapy: 97KFN6045 to (Evaluate:65Csa8085)  Requested for: 17Aug2016; Last   Rx:17Aug2016 Ordered   4  Lisinopril 40 MG Oral Tablet; Take 1 tablet daily; Therapy: 20OQT6367 to (Evaluate:38Ubr3965)  Requested for: 17Aug2016; Last   Rx:17Aug2016 Ordered   5   Nitroglycerin 0 4 MG/SPRAY Translingual Solution; USE 1 SPRAY UNDER THE TONGUE   AS NEEDED FOR CHEST PAIN;   Therapy: 83LHQ1963 to (Evaluate:16Oct2016)  Requested for: 17Aug2016; Last   Rx:17Aug2016 Ordered   6  Spiriva HandiHaler 18 MCG Inhalation Capsule; Therapy: 41JPK8072 to Recorded    Allergies    1   No Known Drug Allergies    Signatures   Electronically signed by : Soraya Matt, ; Aug 29 2016  1:39PM EST                       (Author)

## 2018-01-23 ENCOUNTER — GENERIC CONVERSION - ENCOUNTER (OUTPATIENT)
Dept: OTHER | Facility: OTHER | Age: 58
End: 2018-01-23

## 2018-01-24 ENCOUNTER — PATIENT OUTREACH (OUTPATIENT)
Dept: INTERNAL MEDICINE CLINIC | Facility: CLINIC | Age: 58
End: 2018-01-24

## 2018-01-24 NOTE — MISCELLANEOUS
Reason For Visit  Reason For Visit Free Text Note Form: Call received from pt [phone] requesting letter from MD to request help to not turn off his phone service with [employer] service due to his medical situation he plans to correct issue by 2/3/18   SW to discuss with DR Benny Tineo and and assist as indicated  Active Problems    1  Abnormal LFTs (790 6) (R79 89)   2  Allergic rhinitis (477 9) (J30 9)   3  Colon cancer screening (V76 51) (Z12 11)   4  COPD (chronic obstructive pulmonary disease) (496) (J44 9)   5  Dyspnea on exertion (786 09) (R06 09)   6  Encounter for screening for lung cancer (V76 0) (Z12 2)   7  Gout (274 9) (M10 9)   8  Hyperlipidemia (272 4) (E78 5)   9  Hypertension (401 9) (I10)   10  Obese (278 00) (E66 9)   11  Obstructive sleep apnea (327 23) (G47 33)   12  QT prolongation (794 31) (R94 31)    Current Meds   1  Allopurinol 100 MG Oral Tablet; TAKE 1 TABLET DAILY AS DIRECTED; Therapy: 77RHU7875 to (Evaluate:27Yct8651)  Requested for: 99Zxi4774; Last   Rx:67Cms8161 Ordered   2  AmLODIPine Besylate 10 MG Oral Tablet; TAKE 1 TABLET DAILY FOR BLOOD   PRESSURE; Therapy: 21QGZ2406 to ([facility])  Requested for: 08FKA0100; Last   Rx:05Jun2017 Ordered   3  Atorvastatin Calcium 40 MG Oral Tablet; TAKE ONE TABLET BY MOUTH AT BEDTIME   DAILY; Therapy: 96Fsb0398 to ([facility])  Requested for: 90CHT5872; Last   Rx:05Jun2017 Ordered   4  [facility] Aspirin Low Dose 81 MG Oral Tablet Delayed Release; TAKE 1 TABLET BY MOUTH   ONCE A DAY; Therapy: 66IAK5336 to (Evaluate:28Jun2018)  Requested for: 76TFB3090; Last   Rx:41Keo9480 Ordered   5  Doxazosin Mesylate 4 MG Oral Tablet; take 1 tablet by mouth daily; Therapy: 03YHP5121 to ([facility])  Requested for: 73Nfp9622; Last   Rx:03Nce3875 Ordered   6  Dulera 100-5 MCG/ACT Inhalation Aerosol; INHALE 2 PUFFS TWICE A DAY OR RINSE   AFTER USE;    Therapy: 39RGX0646 to ([phone])  Requested for: 55ALW7861; Last   Rx:27Nov2017 Ordered   7  HydroCHLOROthiazide 25 MG Oral Tablet; TAKE 1 TABLET DAILY; Therapy: 42HDW6552 to (Evaluate:22Vya0913)  Requested for: 44XLM7606; Last   Rx:96Qab4560 Ordered   8  Loratadine 10 MG Oral Tablet; TAKE 1 TABLET AT BEDTIME; Therapy: 20Apr2017 to (Evaluate:38Tas8873)  Requested for: 12EFG9329; Last   Rx:51Iov7795 Ordered   9  Losartan Potassium 100 MG Oral Tablet; TAKE 1 TABLET DAILY; Therapy: 04Bet5585 to (Evaluate:07Jan2019)  Requested for: 12Jan2018; Last   Rx:12Jan2018 Ordered   10  Spiriva Respimat 2 5 MCG/ACT Inhalation Aerosol Solution; INHALE 2 PUFFS ONCE    DAILY; Therapy: 24Apr2017 to (Last Rx:24Apr2017)  Requested for: 24Apr2017 Ordered   11  Ventolin  (90 Base) MCG/ACT Inhalation Aerosol Solution; inhale 2 puffs every    4-6 hours as needed; Therapy: 05VSD6429 to [phone])  Requested for: 20BWC9632; Last    Rx:02Jan2018 Ordered    Allergies    1   No Known Drug Allergies    Signatures   Electronically signed by : Helena Kayser, LCSW; Jan 23 2018  4:31PM EST                       (Author)

## 2018-01-31 DIAGNOSIS — M10.9 GOUT: Primary | ICD-10-CM

## 2018-01-31 RX ORDER — ALLOPURINOL 100 MG/1
1 TABLET ORAL DAILY
COMMUNITY
Start: 2016-11-11 | End: 2018-01-31 | Stop reason: SDUPTHER

## 2018-02-01 RX ORDER — ALLOPURINOL 100 MG/1
100 TABLET ORAL DAILY
Qty: 90 TABLET | Refills: 3 | Status: SHIPPED | OUTPATIENT
Start: 2018-02-01 | End: 2018-03-30 | Stop reason: SDUPTHER

## 2018-02-28 DIAGNOSIS — J44.9 COPD (CHRONIC OBSTRUCTIVE PULMONARY DISEASE) (HCC): Primary | ICD-10-CM

## 2018-03-07 NOTE — MISCELLANEOUS
History of Present Illness  COPD Subsequent Hospital Discharge Phone Calls: The patient is being contacted for continued follow-up after hospitalization  The patient was discharged from the hospital on 8/5/16  I spoke with patient  Patient was identified as medium risk for readmission per risk stratification  The patient was seen by his PCP on 8/17/16  The patient has not been seen by his Pulmonologist since discharge from the hospital    The patient is a former smoker with a 46 pack year history  The patient quit smoking in 2016  Counseling and topics reviewed:  maintenance inhaler and frequency are Spiriva, other medications reviewed are Metoprolol, Lisinipril, Lasix,Amloipine, importance of continued adherence to medication regimen and physician follow-up and COPD Care Number given  Narrative summary:  Patient breathing without difficulty and using Spiriva as ordered  He has a 3 month supply at home  He has one more day left of Metoprolol, Lisinopril, Hydrochlorothiazide, and Amlodipine, and does not have $70 to pay for meds at [facility]  He had contacted  and left messages for [employer], and [facility]  He stated he had not received return calls to date  I called as well and left messages for Godfrey Mondragon (per sherice CM) and [facility]  In addition, patient received letter from [facility] requesting information that had already been sent  Encouraged patient to call Cardiologist (appt in Nov) to check on samples of the above listed medications  He has a stress test scheduled on Monday 8/29  He is not smoking  Will continue to follow-up as needed  Current Meds    1  Aspirin 81 MG TABS; TAKE 1 TABLET DAILY; Therapy: 43XCD4244 to (Evaluate:18Zut8572); Last Rx:29Jan2016 Ordered   2   Nitroglycerin 0 4 MG/SPRAY Translingual Solution; USE 1 SPRAY UNDER THE TONGUE   AS NEEDED FOR CHEST PAIN;   Therapy: 27PKR6514 to (Evaluate:16Oct2016)  Requested for: 17Aug2016; Last   Rx:17Aug2016 Ordered 3  Spiriva HandiHaler 18 MCG Inhalation Capsule; Therapy: 97MFW4310 to Recorded    4  AmLODIPine Besylate 10 MG Oral Tablet; TAKE 1 TABLET DAILY FOR BLOOD   PRESSURE; Therapy: 42JFO6915 to (Evaluate:49Zlo3595)  Requested for: 17Aug2016; Last   Rx:17Aug2016 Ordered   5  Hydrochlorothiazide 25 MG Oral Tablet; TAKE 1 TABLET DAILY; Therapy: 42GRC0392 to (Evaluate:79Irv1683)  Requested for: 17Aug2016; Last   Rx:17Aug2016 Ordered   6  Lisinopril 40 MG Oral Tablet; Take 1 tablet daily; Therapy: 90SQL0771 to (Evaluate:42Uzw5025)  Requested for: 17Aug2016; Last   Rx:17Aug2016 Ordered    Allergies    1  No Known Drug Allergies    Future Appointments    Date/Time Provider Specialty Site   11/01/2016 03:20 PM Brian Jasso DO Internal Medicine [address] PCP   09/02/2016 09:45 AM Leanna Menchaca MD Cardiology [address]     Signatures   Electronically signed by :  Jayson Callaway RT; Aug 26 2016  4:22PM EST                       (Author)

## 2018-03-26 ENCOUNTER — TELEPHONE (OUTPATIENT)
Dept: INTERNAL MEDICINE CLINIC | Facility: CLINIC | Age: 58
End: 2018-03-26

## 2018-03-26 DIAGNOSIS — J44.9 CHRONIC OBSTRUCTIVE PULMONARY DISEASE, UNSPECIFIED COPD TYPE (HCC): Primary | ICD-10-CM

## 2018-03-27 DIAGNOSIS — I10 HYPERTENSION, UNSPECIFIED TYPE: Primary | ICD-10-CM

## 2018-03-29 RX ORDER — LOSARTAN POTASSIUM 100 MG/1
TABLET ORAL DAILY
Status: ON HOLD | COMMUNITY
Start: 2018-03-16 | End: 2018-05-07

## 2018-03-29 RX ORDER — DOXAZOSIN MESYLATE 4 MG/1
TABLET ORAL
Qty: 30 TABLET | Refills: 6 | Status: SHIPPED | OUTPATIENT
Start: 2018-03-29 | End: 2018-09-07 | Stop reason: SDUPTHER

## 2018-03-29 RX ORDER — AMLODIPINE BESYLATE 10 MG/1
TABLET ORAL
Status: ON HOLD | COMMUNITY
Start: 2018-02-28 | End: 2018-05-04 | Stop reason: CLARIF

## 2018-03-29 RX ORDER — HYDROCHLOROTHIAZIDE 25 MG/1
TABLET ORAL DAILY
Status: ON HOLD | COMMUNITY
Start: 2018-01-21 | End: 2018-05-07

## 2018-03-29 RX ORDER — METOPROLOL TARTRATE 100 MG/1
100 TABLET ORAL
COMMUNITY
Start: 2009-09-23 | End: 2018-03-30 | Stop reason: ALTCHOICE

## 2018-03-29 RX ORDER — SALICYLIC ACID 40 %
ADHESIVE PATCH, MEDICATED TOPICAL DAILY
Status: ON HOLD | COMMUNITY
Start: 2018-03-22 | End: 2018-05-07

## 2018-03-29 RX ORDER — LORATADINE 10 MG/1
TABLET ORAL
Status: ON HOLD | COMMUNITY
Start: 2018-02-05 | End: 2018-05-04 | Stop reason: CLARIF

## 2018-03-29 RX ORDER — ATORVASTATIN CALCIUM 40 MG/1
TABLET, FILM COATED ORAL DAILY
Status: ON HOLD | COMMUNITY
Start: 2018-02-28 | End: 2018-05-07

## 2018-03-30 ENCOUNTER — OFFICE VISIT (OUTPATIENT)
Dept: INTERNAL MEDICINE CLINIC | Facility: CLINIC | Age: 58
End: 2018-03-30
Payer: COMMERCIAL

## 2018-03-30 ENCOUNTER — TELEPHONE (OUTPATIENT)
Dept: INTERNAL MEDICINE CLINIC | Facility: CLINIC | Age: 58
End: 2018-03-30

## 2018-03-30 VITALS
HEIGHT: 67 IN | BODY MASS INDEX: 40.21 KG/M2 | SYSTOLIC BLOOD PRESSURE: 148 MMHG | HEART RATE: 72 BPM | TEMPERATURE: 97.8 F | DIASTOLIC BLOOD PRESSURE: 84 MMHG | WEIGHT: 256.17 LBS

## 2018-03-30 DIAGNOSIS — R06.02 SHORTNESS OF BREATH: ICD-10-CM

## 2018-03-30 DIAGNOSIS — Z23 NEED FOR INFLUENZA VACCINATION: Primary | ICD-10-CM

## 2018-03-30 DIAGNOSIS — G47.33 OSA (OBSTRUCTIVE SLEEP APNEA): Chronic | ICD-10-CM

## 2018-03-30 DIAGNOSIS — M10.9 GOUTY ARTHRITIS: ICD-10-CM

## 2018-03-30 DIAGNOSIS — I25.119 CORONARY ARTERY DISEASE INVOLVING NATIVE HEART WITH ANGINA PECTORIS, UNSPECIFIED VESSEL OR LESION TYPE (HCC): ICD-10-CM

## 2018-03-30 DIAGNOSIS — J44.9 CHRONIC OBSTRUCTIVE PULMONARY DISEASE, UNSPECIFIED COPD TYPE (HCC): ICD-10-CM

## 2018-03-30 DIAGNOSIS — I10 ESSENTIAL HYPERTENSION: Chronic | ICD-10-CM

## 2018-03-30 PROCEDURE — 3725F SCREEN DEPRESSION PERFORMED: CPT | Performed by: INTERNAL MEDICINE

## 2018-03-30 PROCEDURE — 99214 OFFICE O/P EST MOD 30 MIN: CPT | Performed by: INTERNAL MEDICINE

## 2018-03-30 RX ORDER — ALLOPURINOL 100 MG/1
100 TABLET ORAL 3 TIMES DAILY
Qty: 90 TABLET | Refills: 3 | Status: SHIPPED | OUTPATIENT
Start: 2018-03-30 | End: 2018-06-28 | Stop reason: SDUPTHER

## 2018-03-30 RX ORDER — GABAPENTIN 100 MG/1
100 CAPSULE ORAL 3 TIMES DAILY
Qty: 90 CAPSULE | Refills: 0 | Status: SHIPPED | OUTPATIENT
Start: 2018-03-30 | End: 2018-07-30 | Stop reason: SDUPTHER

## 2018-03-30 RX ORDER — ACETAMINOPHEN 325 MG/1
975 TABLET ORAL 2 TIMES DAILY PRN
Qty: 180 TABLET | Refills: 3 | Status: SHIPPED | OUTPATIENT
Start: 2018-03-30 | End: 2021-02-09 | Stop reason: ALTCHOICE

## 2018-03-30 NOTE — PROGRESS NOTES
ASSESSMENT/PLAN:  Diagnoses and all orders for this visit:    Need for influenza vaccination    DAVID (obstructive sleep apnea)    Essential hypertension    Coronary artery disease involving native heart with angina pectoris, unspecified vessel or lesion type ([address] )    Gouty arthritis    Plan:  1  COPD - Patient SOB with minimal activity  Likely element of deconditioning as patient is symptomatic on exertion vs uncontrolled COPD and/or cardiac  Continue Dulera  Spiriva was discontinued in the past - it was not helping for him- we will try this again  Patient using Albuterol inhaler 3-4 times daily  Will need formal Spirometry  2  HTN - 148/84 in office today  Continue losartan, amlodipine, HCTZ (advised to take daily, he takes this 3 times weekly) & cardura  Counseled him on checking BP occasionally at home  No longer takes Lopressor (states this was d/c by another doctor)  3  HLD - continue atorvastatin    4  DAVID - Has sleep medicine appointment next week for new CPAP machine- the one he uses now is very old  5  Obesity - patient encouraged to lose weight/start walking again, his BMI is 40 with a weight of 256 lb today (gained about 20 lb since last visit)  Lipid panel normal in 4/2017  6  Gouty arthritis/leg pain - Leg pain bilaterally really bothering him  Continue allopurinol- increase dose to TID  Limit NSAID use given history of CAD  His legs tire out after activity  He does get numbness/tingling  Try Gabapentin low dose and titrate up, tylenol 975 BID  Check B12, vitamin D      7  CAD- Around 4 years ago s/p stents- on ASA and statin  Stable  Saw cardiology in June  Last ECHO was in 9/2016- EF 18% with systolic function on lower end of normal  We will order an ECHO  Health Maintenance:  Gave FIT kit last visit- pt has it- he will send it in soon  Pt has script for low dose CT chest- he will be scheduling this  AAA screening at age 70   Patient [facility] Flu [facility] and PPSV 23 vaccination due- pt does not want this  Follow-up: In 2 months    CHIEF COMPLAINT: Follow-up    HISTORY OF PRESENT ILLNESS:  The patient is here for routine follow-up  He was last seen in July 2017  Quit smoking 2 years ago  He is less active and not working currently  He is trying to modify his diet but he is eating some sweets  He is complaining of arthritis in both of his legs/knees, without numbness or tingling in the legs  His COPD is poorly controlled, he is short of breath with minimal activity and uses his Ventolin about 3 times daily  Review of Systems  Constitutional: Negative for appetite change  Negative for activity change, fatigue  +weight gain  Respiratory: Negative for wheezing, cough  +SOB with minimal activity  Cardiovascular: Negative for chest pain and palpitations  Gastrointestinal: Negative for abdominal pain, nausea and vomiting  Negative for diarrhea or constipation  Negative for blood in stool  Musculoskeletal: +leg pain bilaterally/arthralgias  Neurological: Negative for dizziness, light-headedness and headaches  OBJECTIVE:  Vitals:    03/30/18 0942   BP: 148/84   BP Location: Left arm   Patient Position: Sitting   Cuff Size: Adult   Pulse: 72   Temp: 97 8 °F (36 6 °C)   TempSrc: Oral   Weight: 116 kg (256 lb 2 8 oz)   Height: 5' 6 75" (1 695 m)       Physical Exam  GEN: AAOx3, NAD, obese  HEENT: PEERLA, MM moist  No scleral icterus  CV: RRR, nml S1/S2, no murmur appreciated  Lungs: CTA bilaterally, no w/r/r  Abd: Soft, NT, ND  +NABS  Obese/distended    Neuro: No focal deficits  MSK: No swelling bilateral knees        Current Outpatient Prescriptions:     allopurinol (ZYLOPRIM) 100 mg tablet, Take 1 tablet (100 mg total) by mouth daily, Disp: 90 tablet, Rfl: 3    amLODIPine (NORVASC) 10 mg tablet, , Disp: , Rfl:     atorvastatin (LIPITOR) 40 mg tablet, , Disp: , Rfl:     [facility] ASPIRIN LOW DOSE 81 MG EC tablet, , Disp: , Rfl:     doxazosin (CARDURA) 4 mg tablet, TAKE 1 TABLET BY MOUTH DAILY, Disp: 30 tablet, Rfl: 6    hydrochlorothiazide (HYDRODIURIL) 25 mg tablet, , Disp: , Rfl:     loratadine (CLARITIN) 10 mg tablet, , Disp: , Rfl:     losartan (COZAAR) 100 MG tablet, , Disp: , Rfl:     Mometasone Furo-Formoterol Fum (DULERA) 100-5 MCG/ACT AERO, Inhale 1 puff 2 (two) times a day, Disp: 1 Inhaler, Rfl: 4    VENTOLIN  (90 Base) MCG/ACT inhaler, INHALE 2 PUFFS EVERY 4-6 HOURS AS NEEDED , Disp: 18 Inhaler, Rfl: 0    colchicine (COLCRYS) 0 6 mg tablet, Take 1 tablet by mouth 3 (three) times a day , Disp: 3 tablet, Rfl: 0    metoprolol tartrate (LOPRESSOR) 100 mg tablet, Take 100 mg by mouth, Disp: , Rfl:     oxyCODONE-acetaminophen (PERCOCET) 5-325 mg per tablet, 1 po q 6hrs prn, Disp: 10 tablet, Rfl: 0    Tiotropium Bromide Monohydrate (SPIRIVA RESPIMAT) 2 5 MCG/ACT AERS, Inhale 2 puffs daily, Disp: , Rfl:     Past Medical History:   Diagnosis Date    Cardiac disease     COPD (chronic obstructive pulmonary disease) [facility] [address]     Hypertension     Hypertension     Orthopnea     last assessed: 8/17/16    Pericardial effusion     Sleep apnea      Past Surgical History:   Procedure Laterality Date    CARDIAC SURGERY       Social History     Social History    Marital status: Single     Spouse name: N/A    Number of children: 3    Years of education: N/A     Occupational History    Unemployed, not looking for work      Social History Main Topics    Smoking status: Former Smoker     Packs/day: 0 20     Years: 46 00     Types: Cigarettes     Quit date: 6/30/2015    Smokeless tobacco: Never Used    Alcohol use No      Comment: quit 3 years ago   Drug use: No    Sexual activity: No      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

## 2018-04-02 DIAGNOSIS — J44.9 CHRONIC OBSTRUCTIVE PULMONARY DISEASE, UNSPECIFIED COPD TYPE (HCC): Primary | ICD-10-CM

## 2018-04-04 ENCOUNTER — APPOINTMENT (OUTPATIENT)
Dept: LAB | Facility: CLINIC | Age: 58
End: 2018-04-04
Payer: COMMERCIAL

## 2018-04-04 DIAGNOSIS — M10.9 GOUTY ARTHRITIS: ICD-10-CM

## 2018-04-04 LAB — VIT B12 SERPL-MCNC: 436 PG/ML (ref 100–900)

## 2018-04-04 PROCEDURE — 82652 VIT D 1 25-DIHYDROXY: CPT

## 2018-04-04 PROCEDURE — 36415 COLL VENOUS BLD VENIPUNCTURE: CPT

## 2018-04-04 PROCEDURE — 82607 VITAMIN B-12: CPT

## 2018-04-06 LAB — 1,25(OH)2D3 SERPL-MCNC: 47.6 PG/ML (ref 19.9–79.3)

## 2018-04-17 ENCOUNTER — HOSPITAL ENCOUNTER (OUTPATIENT)
Dept: NON INVASIVE DIAGNOSTICS | Facility: HOSPITAL | Age: 58
Discharge: HOME | End: 2018-04-17
Payer: COMMERCIAL

## 2018-04-17 DIAGNOSIS — R06.02 SHORTNESS OF BREATH: ICD-10-CM

## 2018-04-17 PROCEDURE — 93306 TTE W/DOPPLER COMPLETE: CPT

## 2018-04-17 PROCEDURE — 93306 TTE W/DOPPLER COMPLETE: CPT | Performed by: INTERNAL MEDICINE

## 2018-05-04 ENCOUNTER — HOSPITAL ENCOUNTER (INPATIENT)
Facility: HOSPITAL | Age: 58
LOS: 3 days | Discharge: HOME | DRG: 139 | End: 2018-05-07
Attending: EMERGENCY MEDICINE | Admitting: INTERNAL MEDICINE
Payer: COMMERCIAL

## 2018-05-04 ENCOUNTER — APPOINTMENT (EMERGENCY)
Dept: RADIOLOGY | Facility: HOSPITAL | Age: 58
DRG: 139 | End: 2018-05-04
Payer: COMMERCIAL

## 2018-05-04 DIAGNOSIS — J44.1 COPD WITH ACUTE EXACERBATION (HCC): ICD-10-CM

## 2018-05-04 DIAGNOSIS — J18.9 PNEUMONIA: ICD-10-CM

## 2018-05-04 DIAGNOSIS — I25.119 CORONARY ARTERY DISEASE INVOLVING NATIVE HEART WITH ANGINA PECTORIS, UNSPECIFIED VESSEL OR LESION TYPE (HCC): ICD-10-CM

## 2018-05-04 DIAGNOSIS — I10 ESSENTIAL HYPERTENSION: Chronic | ICD-10-CM

## 2018-05-04 DIAGNOSIS — A41.9 SEPSIS (HCC): Primary | ICD-10-CM

## 2018-05-04 PROBLEM — E78.5 HYPERLIPIDEMIA: Status: ACTIVE | Noted: 2018-05-04

## 2018-05-04 PROBLEM — M10.9 GOUT: Status: ACTIVE | Noted: 2018-05-04

## 2018-05-04 LAB
ALBUMIN SERPL BCP-MCNC: 3.6 G/DL (ref 3.5–5)
ALP SERPL-CCNC: 49 U/L (ref 46–116)
ALT SERPL W P-5'-P-CCNC: 19 U/L (ref 12–78)
ANION GAP SERPL CALCULATED.3IONS-SCNC: 8 MMOL/L (ref 4–13)
APTT PPP: 32 SECONDS (ref 23–35)
AST SERPL W P-5'-P-CCNC: 16 U/L (ref 5–45)
ATRIAL RATE: 91 BPM
BASOPHILS # BLD AUTO: 0.01 THOUSANDS/ΜL (ref 0–0.1)
BASOPHILS NFR BLD AUTO: 0 % (ref 0–1)
BILIRUB SERPL-MCNC: 0.5 MG/DL (ref 0.2–1)
BUN SERPL-MCNC: 13 MG/DL (ref 5–25)
CALCIUM SERPL-MCNC: 8.1 MG/DL (ref 8.3–10.1)
CHLORIDE SERPL-SCNC: 103 MMOL/L (ref 100–108)
CO2 SERPL-SCNC: 26 MMOL/L (ref 21–32)
CREAT SERPL-MCNC: 1.34 MG/DL (ref 0.6–1.3)
EOSINOPHIL # BLD AUTO: 0.04 THOUSAND/ΜL (ref 0–0.61)
EOSINOPHIL NFR BLD AUTO: 1 % (ref 0–6)
ERYTHROCYTE [DISTWIDTH] IN BLOOD BY AUTOMATED COUNT: 13.3 % (ref 11.6–15.1)
GFR SERPL CREATININE-BSD FRML MDRD: 58 ML/MIN/1.73SQ M
GLUCOSE SERPL-MCNC: 110 MG/DL (ref 65–140)
HCT VFR BLD AUTO: 41.9 % (ref 36.5–49.3)
HGB BLD-MCNC: 13.9 G/DL (ref 12–17)
INR PPP: 1.11 (ref 0.86–1.16)
L PNEUMO1 AG UR QL IA.RAPID: NEGATIVE
LACTATE SERPL-SCNC: 0.8 MMOL/L (ref 0.5–2)
LYMPHOCYTES # BLD AUTO: 1.24 THOUSANDS/ΜL (ref 0.6–4.47)
LYMPHOCYTES NFR BLD AUTO: 19 % (ref 14–44)
MCH RBC QN AUTO: 30 PG (ref 26.8–34.3)
MCHC RBC AUTO-ENTMCNC: 33.2 G/DL (ref 31.4–37.4)
MCV RBC AUTO: 91 FL (ref 82–98)
MONOCYTES # BLD AUTO: 0.86 THOUSAND/ΜL (ref 0.17–1.22)
MONOCYTES NFR BLD AUTO: 13 % (ref 4–12)
NEUTROPHILS # BLD AUTO: 4.4 THOUSANDS/ΜL (ref 1.85–7.62)
NEUTS SEG NFR BLD AUTO: 67 % (ref 43–75)
NRBC BLD AUTO-RTO: 0 /100 WBCS
NT-PROBNP SERPL-MCNC: 302 PG/ML
P AXIS: 51 DEGREES
PLATELET # BLD AUTO: 166 THOUSANDS/UL (ref 149–390)
PMV BLD AUTO: 10.9 FL (ref 8.9–12.7)
POTASSIUM SERPL-SCNC: 4 MMOL/L (ref 3.5–5.3)
PR INTERVAL: 174 MS
PROCALCITONIN SERPL-MCNC: <0.05 NG/ML
PROT SERPL-MCNC: 7.2 G/DL (ref 6.4–8.2)
PROTHROMBIN TIME: 14.3 SECONDS (ref 12.1–14.4)
QRS AXIS: 11 DEGREES
QRSD INTERVAL: 102 MS
QT INTERVAL: 344 MS
QTC INTERVAL: 423 MS
RBC # BLD AUTO: 4.63 MILLION/UL (ref 3.88–5.62)
S PNEUM AG UR QL: NEGATIVE
SODIUM SERPL-SCNC: 137 MMOL/L (ref 136–145)
T WAVE AXIS: 67 DEGREES
TROPONIN I SERPL-MCNC: <0.02 NG/ML
VENTRICULAR RATE: 91 BPM
WBC # BLD AUTO: 6.56 THOUSAND/UL (ref 4.31–10.16)

## 2018-05-04 PROCEDURE — 93005 ELECTROCARDIOGRAM TRACING: CPT

## 2018-05-04 PROCEDURE — 99223 1ST HOSP IP/OBS HIGH 75: CPT | Performed by: INTERNAL MEDICINE

## 2018-05-04 PROCEDURE — 96365 THER/PROPH/DIAG IV INF INIT: CPT

## 2018-05-04 PROCEDURE — 84145 PROCALCITONIN (PCT): CPT | Performed by: INTERNAL MEDICINE

## 2018-05-04 PROCEDURE — 84484 ASSAY OF TROPONIN QUANT: CPT | Performed by: EMERGENCY MEDICINE

## 2018-05-04 PROCEDURE — 71045 X-RAY EXAM CHEST 1 VIEW: CPT

## 2018-05-04 PROCEDURE — 96367 TX/PROPH/DG ADDL SEQ IV INF: CPT

## 2018-05-04 PROCEDURE — 80053 COMPREHEN METABOLIC PANEL: CPT | Performed by: EMERGENCY MEDICINE

## 2018-05-04 PROCEDURE — 94644 CONT INHLJ TX 1ST HOUR: CPT

## 2018-05-04 PROCEDURE — 96361 HYDRATE IV INFUSION ADD-ON: CPT

## 2018-05-04 PROCEDURE — 93010 ELECTROCARDIOGRAM REPORT: CPT | Performed by: INTERNAL MEDICINE

## 2018-05-04 PROCEDURE — 36415 COLL VENOUS BLD VENIPUNCTURE: CPT

## 2018-05-04 PROCEDURE — 99285 EMERGENCY DEPT VISIT HI MDM: CPT

## 2018-05-04 PROCEDURE — 87040 BLOOD CULTURE FOR BACTERIA: CPT | Performed by: EMERGENCY MEDICINE

## 2018-05-04 PROCEDURE — 94760 N-INVAS EAR/PLS OXIMETRY 1: CPT

## 2018-05-04 PROCEDURE — 85730 THROMBOPLASTIN TIME PARTIAL: CPT | Performed by: EMERGENCY MEDICINE

## 2018-05-04 PROCEDURE — 83605 ASSAY OF LACTIC ACID: CPT | Performed by: EMERGENCY MEDICINE

## 2018-05-04 PROCEDURE — 83880 ASSAY OF NATRIURETIC PEPTIDE: CPT | Performed by: EMERGENCY MEDICINE

## 2018-05-04 PROCEDURE — 85610 PROTHROMBIN TIME: CPT | Performed by: EMERGENCY MEDICINE

## 2018-05-04 PROCEDURE — 96375 TX/PRO/DX INJ NEW DRUG ADDON: CPT

## 2018-05-04 PROCEDURE — 94660 CPAP INITIATION&MGMT: CPT

## 2018-05-04 PROCEDURE — 85025 COMPLETE CBC W/AUTO DIFF WBC: CPT | Performed by: EMERGENCY MEDICINE

## 2018-05-04 PROCEDURE — 94640 AIRWAY INHALATION TREATMENT: CPT

## 2018-05-04 PROCEDURE — 87449 NOS EACH ORGANISM AG IA: CPT | Performed by: INTERNAL MEDICINE

## 2018-05-04 RX ORDER — ALLOPURINOL 100 MG/1
100 TABLET ORAL 4 TIMES DAILY
Status: DISCONTINUED | OUTPATIENT
Start: 2018-05-04 | End: 2018-05-07 | Stop reason: HOSPADM

## 2018-05-04 RX ORDER — ACETAMINOPHEN 325 MG/1
650 TABLET ORAL ONCE
Status: COMPLETED | OUTPATIENT
Start: 2018-05-04 | End: 2018-05-04

## 2018-05-04 RX ORDER — ACETAMINOPHEN 325 MG/1
650 TABLET ORAL EVERY 6 HOURS PRN
Status: DISCONTINUED | OUTPATIENT
Start: 2018-05-04 | End: 2018-05-07 | Stop reason: HOSPADM

## 2018-05-04 RX ORDER — GUAIFENESIN 600 MG
1200 TABLET, EXTENDED RELEASE 12 HR ORAL 2 TIMES DAILY
Status: DISCONTINUED | OUTPATIENT
Start: 2018-05-04 | End: 2018-05-07 | Stop reason: HOSPADM

## 2018-05-04 RX ORDER — METHYLPREDNISOLONE SODIUM SUCCINATE 40 MG/ML
40 INJECTION, POWDER, LYOPHILIZED, FOR SOLUTION INTRAMUSCULAR; INTRAVENOUS EVERY 8 HOURS
Status: DISCONTINUED | OUTPATIENT
Start: 2018-05-04 | End: 2018-05-06

## 2018-05-04 RX ORDER — ALBUTEROL SULFATE 2.5 MG/3ML
10 SOLUTION RESPIRATORY (INHALATION) ONCE
Status: COMPLETED | OUTPATIENT
Start: 2018-05-04 | End: 2018-05-04

## 2018-05-04 RX ORDER — METHYLPREDNISOLONE SODIUM SUCCINATE 125 MG/2ML
125 INJECTION, POWDER, LYOPHILIZED, FOR SOLUTION INTRAMUSCULAR; INTRAVENOUS ONCE
Status: COMPLETED | OUTPATIENT
Start: 2018-05-04 | End: 2018-05-04

## 2018-05-04 RX ORDER — ONDANSETRON 2 MG/ML
4 INJECTION INTRAMUSCULAR; INTRAVENOUS EVERY 6 HOURS PRN
Status: DISCONTINUED | OUTPATIENT
Start: 2018-05-04 | End: 2018-05-07 | Stop reason: HOSPADM

## 2018-05-04 RX ORDER — LEVALBUTEROL 1.25 MG/.5ML
1.25 SOLUTION, CONCENTRATE RESPIRATORY (INHALATION)
Status: DISCONTINUED | OUTPATIENT
Start: 2018-05-04 | End: 2018-05-07 | Stop reason: HOSPADM

## 2018-05-04 RX ORDER — GABAPENTIN 100 MG/1
100 CAPSULE ORAL
Status: DISCONTINUED | OUTPATIENT
Start: 2018-05-04 | End: 2018-05-07 | Stop reason: HOSPADM

## 2018-05-04 RX ORDER — ASPIRIN 81 MG/1
81 TABLET ORAL DAILY
Status: DISCONTINUED | OUTPATIENT
Start: 2018-05-05 | End: 2018-05-07 | Stop reason: HOSPADM

## 2018-05-04 RX ORDER — SODIUM CHLORIDE FOR INHALATION 0.9 %
3 VIAL, NEBULIZER (ML) INHALATION ONCE
Status: COMPLETED | OUTPATIENT
Start: 2018-05-04 | End: 2018-05-04

## 2018-05-04 RX ORDER — SODIUM CHLORIDE FOR INHALATION 0.9 %
3 VIAL, NEBULIZER (ML) INHALATION
Status: DISCONTINUED | OUTPATIENT
Start: 2018-05-04 | End: 2018-05-04

## 2018-05-04 RX ORDER — SODIUM CHLORIDE 9 MG/ML
100 INJECTION, SOLUTION INTRAVENOUS CONTINUOUS
Status: DISCONTINUED | OUTPATIENT
Start: 2018-05-04 | End: 2018-05-05

## 2018-05-04 RX ORDER — DOXAZOSIN MESYLATE 4 MG/1
4 TABLET ORAL EVERY EVENING
Status: DISCONTINUED | OUTPATIENT
Start: 2018-05-04 | End: 2018-05-07 | Stop reason: HOSPADM

## 2018-05-04 RX ORDER — MAGNESIUM HYDROXIDE/ALUMINUM HYDROXICE/SIMETHICONE 120; 1200; 1200 MG/30ML; MG/30ML; MG/30ML
15 SUSPENSION ORAL EVERY 6 HOURS PRN
Status: DISCONTINUED | OUTPATIENT
Start: 2018-05-04 | End: 2018-05-07 | Stop reason: HOSPADM

## 2018-05-04 RX ORDER — ACETAMINOPHEN 325 MG/1
975 TABLET ORAL 2 TIMES DAILY PRN
Status: DISCONTINUED | OUTPATIENT
Start: 2018-05-04 | End: 2018-05-07 | Stop reason: HOSPADM

## 2018-05-04 RX ORDER — ATORVASTATIN CALCIUM 40 MG/1
40 TABLET, FILM COATED ORAL
Status: DISCONTINUED | OUTPATIENT
Start: 2018-05-04 | End: 2018-05-07 | Stop reason: HOSPADM

## 2018-05-04 RX ORDER — LOSARTAN POTASSIUM 50 MG/1
100 TABLET ORAL DAILY
Status: DISCONTINUED | OUTPATIENT
Start: 2018-05-04 | End: 2018-05-07 | Stop reason: HOSPADM

## 2018-05-04 RX ORDER — 0.9 % SODIUM CHLORIDE 0.9 %
3 VIAL (ML) INJECTION AS NEEDED
Status: DISCONTINUED | OUTPATIENT
Start: 2018-05-04 | End: 2018-05-04

## 2018-05-04 RX ADMIN — IPRATROPIUM BROMIDE 1 MG: 0.5 SOLUTION RESPIRATORY (INHALATION) at 11:08

## 2018-05-04 RX ADMIN — GUAIFENESIN 1200 MG: 600 TABLET, EXTENDED RELEASE ORAL at 17:19

## 2018-05-04 RX ADMIN — ALBUTEROL SULFATE 10 MG: 2.5 SOLUTION RESPIRATORY (INHALATION) at 11:08

## 2018-05-04 RX ADMIN — LOSARTAN POTASSIUM 100 MG: 50 TABLET ORAL at 17:21

## 2018-05-04 RX ADMIN — DOXAZOSIN 4 MG: 4 TABLET ORAL at 17:19

## 2018-05-04 RX ADMIN — IPRATROPIUM BROMIDE 0.5 MG: 0.5 SOLUTION RESPIRATORY (INHALATION) at 19:02

## 2018-05-04 RX ADMIN — ATORVASTATIN CALCIUM 40 MG: 40 TABLET, FILM COATED ORAL at 17:19

## 2018-05-04 RX ADMIN — METHYLPREDNISOLONE SODIUM SUCCINATE 40 MG: 40 INJECTION, POWDER, FOR SOLUTION INTRAMUSCULAR; INTRAVENOUS at 17:19

## 2018-05-04 RX ADMIN — ALLOPURINOL 100 MG: 100 TABLET ORAL at 17:19

## 2018-05-04 RX ADMIN — ALLOPURINOL 100 MG: 100 TABLET ORAL at 21:15

## 2018-05-04 RX ADMIN — GABAPENTIN 100 MG: 100 CAPSULE ORAL at 21:15

## 2018-05-04 RX ADMIN — LEVALBUTEROL HYDROCHLORIDE 1.25 MG: 1.25 SOLUTION, CONCENTRATE RESPIRATORY (INHALATION) at 19:02

## 2018-05-04 RX ADMIN — AZITHROMYCIN 500 MG: 500 INJECTION, POWDER, LYOPHILIZED, FOR SOLUTION INTRAVENOUS at 12:32

## 2018-05-04 RX ADMIN — METHYLPREDNISOLONE SODIUM SUCCINATE 125 MG: 125 INJECTION, POWDER, FOR SOLUTION INTRAMUSCULAR; INTRAVENOUS at 11:41

## 2018-05-04 RX ADMIN — CEFTRIAXONE 1000 MG: 1 INJECTION, SOLUTION INTRAVENOUS at 11:42

## 2018-05-04 RX ADMIN — SODIUM CHLORIDE 100 ML/HR: 0.9 INJECTION, SOLUTION INTRAVENOUS at 17:19

## 2018-05-04 RX ADMIN — SODIUM CHLORIDE 1000 ML: 0.9 INJECTION, SOLUTION INTRAVENOUS at 11:04

## 2018-05-04 RX ADMIN — ISODIUM CHLORIDE 3 ML: 0.03 SOLUTION RESPIRATORY (INHALATION) at 11:08

## 2018-05-04 RX ADMIN — ACETAMINOPHEN 650 MG: 325 TABLET, FILM COATED ORAL at 11:43

## 2018-05-04 RX ADMIN — FLUTICASONE PROPIONATE AND SALMETEROL 1 PUFF: 50; 250 POWDER RESPIRATORY (INHALATION) at 21:16

## 2018-05-04 NOTE — SEPSIS NOTE
Sepsis Note   Miles Tamez 59 y o  male MRN: [id number]  Unit/Bed#: ED 27 Encounter: [id number]            Initial Sepsis Screening     Row Name 05/04/18 1325                Is the patient's history suggestive of a new or worsening infection? (!)  Yes (Proceed)  -        Suspected source of infection pneumonia  -        Are two or more of the following signs & symptoms of infection both present and new to the patient? (!)  Yes (Proceed)  -        Indicate SIRS criteria Hyperthemia > 38 3C (100 9F); Tachypnea > 20 resp per min  -        If the answer is yes to both questions, suspicion of sepsis is present  --        If severe sepsis is present AND tissue hypoperfusion perists in the hour after fluid resuscitation or lactate > 4, the patient meets criteria for SEPTIC SHOCK  --        Are any of the following organ dysfunction criteria present within 6 hours of suspected infection and SIRS criteria that are NOT considered to be chronic conditions? (!)  Yes  -        Organ dysfunction Acute respiratory failure (new need for invasive or non-invasive mechanical ventilation)  -        Date of presentation of severe sepsis  --        Time of presentation of severe sepsis  --        Tissue hypoperfusion persists in the hour after crystalloid fluid administration, evidenced, by either:  --        Was hypotension present within one hour of the conclusion of crystalloid fluid administration?  --        Date of presentation of septic shock  --        Time of presentation of septic shock  --          User Key  (r) = Recorded By, (t) = Taken By, (c) = Cosigned By    [address] Name Provider Type     Mimi Fatima MD Resident               Default Flowsheet Data (last 720 hours)      Sepsis Reassessment     Row Name 05/04/18 1326                   Volume Status and Tissue Perfusion Post Fluid Resuscitation- Must Document ALL of the Following:    Vital Signs Reviewed Yes  -        Cardio Normal S1/S2; Regular rate and rhythm; No murmor  -        Pulmonary (!)  Normal effort; Wheezes;Rales  -        Capillary Refill Brisk  -        Peripheral Pulses Radial  -        Peripheral Pulse +2  -        Skin Warm;Dry  -           *OR*   Intensive Monitoring- Must Document Two * of the Following Four *:    Vital Signs Reviewed  --        * Central Venous Pressure (CVP or RAP)  --        * Central Venous Oxygen (SVO2, ScvO2 or Oxygen saturation via central catheter)  --        * Bedside Cardiovascular US in IVC diameter and % collapse  --        * Passive Leg Raise OR Crystalloid Challenge  --          User Key  (r) = Recorded By, (t) = Taken By, (c) = Cosigned By    Initials Name Provider Type     Concha Cabrera MD Resident

## 2018-05-04 NOTE — H&P
History and Physical - [facility] Internal Medicine    Patient Information: Rosario Banda 59 y o  male MRN: [id number]  Unit/Bed#: [facility] 401-01 Encounter: [id number]  Admitting Physician: Alexx Mckenzie DO  PCP: Janice Lea DO  Date of Admission:  05/04/18    Assessment/Plan:    Hospital Problem List:     Principal Problem:    Acute respiratory failure with hypoxia and hypercapnia ([facility] )  Active Problems:    DAVID (obstructive sleep apnea)    CAD (coronary artery disease)    COPD with acute exacerbation ([facility] )    Community acquired pneumonia    Gout    Hyperlipidemia      Plan for the Primary Problem(s):  · Acute Hypoxic / Hypercapnic Respiratory Failure -   · COPD with Acute Exacerbation -   · Steroid - solumedrol 40 mg IV q8h  · Nebulizer - Xopenex and Atrovent tid  · Antitussive - Not currently indicated  · Inhalers - formulary substitution for [facility] with advair  · Should be set up with pulmonary follow up post hospitalization  · Community Acquired Pneumonia -   · Antibiotic - Will Use Rocephin / Azithromycin  · Mucolytic - Mucinex  · Cultures - follow up blood cultures  Obtain sputum culture if possible  · IV fluids  · Tylenol for fevers  · Supportive care  · Oxygenation / Ventilation - will use non-rebreather currently, but wean down as tolerated with goal oxygen saturation 88-95%  CPAP at night  Plan for Additional Problems:   · Obstructive Sleep Apnea - Uses CPAP at night with setting 12 cm H2O   · Essential Hypertension - Continue home regimen  Monitor blood pressures  So far they are controlled  · Gout - Continue allopurinol  No flare currently  · Hyperlipidemia - continue home therapy  VTE Prophylaxis: Enoxaparin (Lovenox)  / sequential compression device   Code Status: Full Code, level 1  POLST: There is no POLST form on file for this patient (pre-hospital)    Anticipated Length of Stay:  Patient will be admitted on an Inpatient basis with an anticipated length of stay of  > 2 midnights  Justification for Hospital Stay: Evaluation for pneumonia, COPD Exacerbation with obstructive sleep apnea  Total Time for Visit, including Counseling / Coordination of Care: 45 minutes  Greater than 50% of this total time spent on direct patient counseling and coordination of care  Chief Complaint:   Increased shortness of breath and short term memory impairments    History of Present Illness:    Carmelo Fisher is a 59 y o  male who presents with increased shortness of breath and wheezing  The patient states that he started having increased wheezing about 3 days ago but did not notice much of an increase in his coughing which is somewhat chronic  However, he did feel some chest congestion  The patient at times felt cold but did not take his temperature at home to determine if he had any fevers or not  The patient has had some increased dizziness and lightheadedness over the last 3 days  The patient states that this morning he felt funny and felt that his chest was tight and he was a bit more short of breath to the point that he felt that he needed a nebulizer treatment in the emergency department, stating that I just know when I need a nebulizer treatment  Therefore, at 6:30 a m  the patient recalls leaving his home to try to come to the emergency department  However, the patient did not come into the emergency department until 10:00 a m  he states  The patient thinks that there is a possibility that he slept in his car in the parking lot of the hospital but does not even recall driving here  When the patient is brought into the emergency department it is noted that his oxygen levels are hypoxic initially with oxygen saturations in the low 80% range  The patient was given nebulizer treatments and initially was placed on BiPAP in the emergency department    After transient use of BiPAP as well as the nebulizers the patient notes some improvement in his breathing, but still feels that his respiratory symptoms are present and he is not at baseline  The patient is noted to be wheezing and there is concern for COPD exacerbation  The patient is also noted to be febrile with a temperature of 103° on arrival to the emergency department  Request is made for admission into the hospital for treatment of suspected pneumonia and COPD exacerbation  Review of Systems:    Review of Systems   Constitutional: Positive for chills  Negative for activity change, appetite change, diaphoresis and fever (did not check temperature)  HENT: Negative for congestion, rhinorrhea, sinus pain, sinus pressure and trouble swallowing  Eyes: Negative  Negative for pain and visual disturbance  Respiratory: Positive for chest tightness, shortness of breath and wheezing  Negative for cough  No orthopnea   Cardiovascular: Negative for chest pain, palpitations and leg swelling  Gastrointestinal: Negative for abdominal distention, abdominal pain, constipation, diarrhea, nausea and vomiting  Endocrine: Negative  Genitourinary: Negative for decreased urine volume, difficulty urinating, flank pain and frequency  Musculoskeletal: Negative for arthralgias, back pain, neck pain and neck stiffness  Skin: Negative for pallor and rash  Neurological: Negative for dizziness, syncope, speech difficulty, light-headedness, numbness and headaches  Hematological: Negative  Psychiatric/Behavioral: Positive for confusion and decreased concentration  Transient periods of memory loss last 24 hours   All other systems reviewed and are negative        Past Medical and Surgical History:     Past Medical History:   Diagnosis Date    CAD (coronary artery disease)     [facility]     Gout     Hyperlipidemia     Hypertension     Orthopnea     last assessed: 8/17/16    Pericardial effusion     Sleep apnea        Past Surgical History:   Procedure Laterality Date    CARDIAC CATHETERIZATION         Meds/Allergies:    Prior to Admission medications    Medication Sig Start Date End Date Taking? Authorizing Provider   acetaminophen (TYLENOL) 325 mg tablet Take 3 tablets (975 mg total) by mouth 2 (two) times a day as needed for mild pain 3/30/18   Alonso Mesa, DO   allopurinol (ZYLOPRIM) 100 mg tablet Take 1 tablet (100 mg total) by mouth 3 (three) times a day 3/30/18   Alonso Mesa, DO   amLODIPine (NORVASC) 10 mg tablet  2/28/18   Historical Provider, MD   atorvastatin (LIPITOR) 40 mg tablet  2/28/18   Historical Provider, MD   [facility] ASPIRIN LOW DOSE 81 MG EC tablet  3/22/18   Historical Provider, MD   doxazosin (CARDURA) 4 mg tablet TAKE 1 TABLET BY MOUTH DAILY 3/29/18   Narda Mccarthy MD   gabapentin (NEURONTIN) 100 mg capsule Take 1 capsule (100 mg total) by mouth 3 (three) times a day 3/30/18   Alonso Mesa, DO   hydrochlorothiazide (HYDRODIURIL) 25 mg tablet  1/21/18   Historical Provider, MD   loratadine (CLARITIN) 10 mg tablet  2/5/18   Historical Provider, MD   losartan (COZAAR) 100 MG tablet  3/16/18   Historical Provider, MD   Mometasone Furo-Formoterol Fum (DULERA) 100-5 MCG/ACT AERO Inhale 1 puff 2 (two) times a day 2/28/18   Brennen Centeno   Tiotropium Bromide Monohydrate (SPIRIVA RESPIMAT) 2 5 MCG/ACT AERS Inhale 1 Act (2 5 mcg total) daily 4/2/18   Laura Torres DO   VENTOLIN  (90 Base) MCG/ACT inhaler INHALE 2 PUFFS EVERY 4-6 HOURS AS NEEDED  3/26/18   Henry Hodges DO     I have reviewed home medications with patient personally  Allergies: No Known Allergies    Social History:     Marital Status: Single     Substance Use History:   History   Alcohol Use No     Comment: quit 3 years ago  History   Smoking Status    Former Smoker    Packs/day: 0 20    Years: 46 00    Types: Cigarettes    Quit date: 6/30/2015   Smokeless Tobacco    Never Used     History   Drug Use No       Family History:    non-contributory;   No sick contacts with respiratory symptoms  Physical Exam:     Vitals:   Blood Pressure: 137/67 (Map 96) (05/04/18 1533)  Pulse: 74 (05/04/18 1533)  Temperature: 97 5 °F (36 4 °C) (05/04/18 1533)  Temp Source: Axillary (05/04/18 1533)  Respirations: 20 (05/04/18 1533)  Height: 5' 9" (175 3 cm) (Stated ) (05/04/18 1533)  Weight - Scale: 116 kg (255 lb 15 3 oz) (Standing scale ) (05/04/18 1533)  SpO2: 98 % (05/04/18 1533)    Physical Exam   Constitutional: He is oriented to person, place, and time  No distress  HENT:   Slightly dry oral mucosa   Eyes: Conjunctivae are normal  Pupils are equal, round, and reactive to light  Neck: No JVD present  Cardiovascular: Normal rate and regular rhythm  No murmur heard  Pulmonary/Chest: No respiratory distress  He has wheezes  He has no rales  Coarse breath sounds  Mild rhonchi in left lower lung field  Abdominal: Soft  Bowel sounds are normal  He exhibits no distension  There is no tenderness  Musculoskeletal: He exhibits no edema or tenderness  Lymphadenopathy:     He has no cervical adenopathy  Neurological: He is alert and oriented to person, place, and time  No cranial nerve deficit  Skin: Skin is warm and dry  No rash noted  He is not diaphoretic  No erythema  Psychiatric: He has a normal mood and affect  Vitals reviewed  Additional Data:     Lab Results: I have personally reviewed pertinent reports          Results from last 7 days  Lab Units 05/04/18  1055   WBC Thousand/uL 6 56   HEMOGLOBIN g/dL 13 9   HEMATOCRIT % 41 9   PLATELETS Thousands/uL 166   NEUTROS PCT % 67   LYMPHS PCT % 19   MONOS PCT % 13*   EOS PCT % 1       Results from last 7 days  Lab Units 05/04/18  1055   SODIUM mmol/L 137   POTASSIUM mmol/L 4 0   CHLORIDE mmol/L 103   CO2 mmol/L 26   BUN mg/dL 13   CREATININE mg/dL 1 34*   CALCIUM mg/dL 8 1*   TOTAL PROTEIN g/dL 7 2   BILIRUBIN TOTAL mg/dL 0 50   ALK PHOS U/L 49   ALT U/L 19   AST U/L 16   GLUCOSE RANDOM mg/dL 110       Results from last 7 days  Lab Units 05/04/18  1105   INR  1 11       Imaging: I have personally reviewed pertinent reports  Xr Chest Portable    Result Date: 5/4/2018  Narrative: CHEST INDICATION:   chest pain  Shortness of breath  COMPARISON:  August 3, 2016  EXAM PERFORMED/VIEWS:  XR CHEST PORTABLE FINDINGS: Heart enlarged  Prominence of the pulmonary vessels  Hazy pulmonary opacification in the left perihilar and right infrahilar regions, most consistent with asymmetric pulmonary edema or, less likely, bilateral pneumonia  Lungs and pleural spaces otherwise clear  No pneumothorax  Osseous structures appear within normal limits for patient age  Impression: Pulmonary vascular congestion  Patchy bilateral pulmonary opacities, most consistent with pulmonary edema or, less likely, pneumonia  Workstation performed: [id number]       EKG, Pathology, and Other Studies Reviewed on Admission:   · EKG: Normal sinus rhythm with rate 91 bpm   No significant ST-T changes  Normal PA, QRS, and QT intervals  Allscripts / Epic Records Reviewed: Yes     ** Please Note: This note has been constructed using a voice recognition system   **

## 2018-05-04 NOTE — ED NOTES
Report phoned to [facility] on P4, pt prepared for transfer to room 401 , aware of Level II stepdown     Valentin Jorge RN  05/04/18 1536

## 2018-05-04 NOTE — ED NOTES
Pt wants to sign out AMA, spoke with Dr Marisa Oconnell , Wants Bi-Pap off       Roxana Mulligan, RN  05/04/18 4174

## 2018-05-04 NOTE — ED PROVIDER NOTES
History  Chief Complaint   Patient presents with    Shortness of Breath     fever chills     61-year-old male with history of COPD on CPAP at night presents to the emergency department febrile a hypoxic as Dr  room  Patient hypoxic to the low 70s on room air placed on nasal cannula then non-rebreather facemask with improvement of his oxygenation to 98%  Patient felt much better on high-flow oxygen was also started on BiPAP with a heart neb which continued to improve his symptoms  Patient states for the past few days he has been feeling short of breath he has no symptoms of upper respiratory infection has not taken anything for fever has not noticed he has been febrile he denies any chest pain any nausea or vomiting any  or GI symptoms  His remaining ROS is negative  History provided by:  Patient   used: No    Shortness of Breath   Severity:  Severe  Onset quality:  Gradual  Timing:  Constant  Progression:  Worsening  Chronicity:  New  Context: activity and weather changes    Relieved by:  Oxygen  Worsened by:  Nothing  Ineffective treatments:  None tried  Associated symptoms: no abdominal pain, no chest pain, no fever, no headaches, no rash, no sore throat and no vomiting    Risk factors: obesity        Prior to Admission Medications   Prescriptions Last Dose Informant Patient Reported? Taking? [facility] ASPIRIN LOW DOSE 81 MG EC tablet  Self Yes No   Mometasone Furo-Formoterol Fum (DULERA) 100-5 MCG/ACT AERO  Self No No   Sig: Inhale 1 puff 2 (two) times a day   VENTOLIN  (90 Base) MCG/ACT inhaler  Self No No   Sig: INHALE 2 PUFFS EVERY 4-6 HOURS AS NEEDED     acetaminophen (TYLENOL) 325 mg tablet   No No   Sig: Take 3 tablets (975 mg total) by mouth 2 (two) times a day as needed for mild pain   allopurinol (ZYLOPRIM) 100 mg tablet   No No   Sig: Take 1 tablet (100 mg total) by mouth 3 (three) times a day   Patient taking differently: Take 100 mg by mouth 4 (four) times a day atorvastatin (LIPITOR) 40 mg tablet  Self Yes No   doxazosin (CARDURA) 4 mg tablet  Self No No   Sig: TAKE 1 TABLET BY MOUTH DAILY   gabapentin (NEURONTIN) 100 mg capsule   No No   Sig: Take 1 capsule (100 mg total) by mouth 3 (three) times a day   Patient taking differently: Take 100 mg by mouth daily at bedtime     hydrochlorothiazide (HYDRODIURIL) 25 mg tablet  Self Yes No   losartan (COZAAR) 100 MG tablet  Self Yes No      Facility-Administered Medications: None       Past Medical History:   Diagnosis Date    CAD (coronary artery disease)     [facility]     Gout     Hyperlipidemia     Hypertension     Orthopnea     last assessed: 8/17/16    Pericardial effusion     Sleep apnea        Past Surgical History:   Procedure Laterality Date    CARDIAC CATHETERIZATION         Family History   Problem Relation Age of Onset    Diabetes Father     Hypertension Mother      I have reviewed and agree with the history as documented  Social History   Substance Use Topics    Smoking status: Former Smoker     Packs/day: 0 20     Years: 46 00     Types: Cigarettes     Quit date: 6/30/2015    Smokeless tobacco: Never Used    Alcohol use No      Comment: quit 3 years ago  Review of Systems   Constitutional: Negative for chills, fatigue and fever  HENT: Negative for sore throat  Eyes: Negative for visual disturbance  Respiratory: Positive for shortness of breath  Cardiovascular: Negative for chest pain  Gastrointestinal: Negative for abdominal pain, blood in stool, constipation, diarrhea, nausea and vomiting  Genitourinary: Negative for difficulty urinating, dysuria and hematuria  Musculoskeletal: Negative for arthralgias  Skin: Negative for rash  Neurological: Negative for syncope, weakness and headaches  Hematological: Negative for adenopathy  Psychiatric/Behavioral: Negative for agitation and behavioral problems     All other systems reviewed and are negative  Physical Exam  ED Triage Vitals   Temperature Pulse Respirations Blood Pressure SpO2   05/04/18 1100 05/04/18 1115 05/04/18 1450 05/04/18 1230 05/04/18 1109   (!) 103 °F (39 4 °C) 88 18 123/62 98 %      Temp Source Heart Rate Source Patient Position - Orthostatic VS BP Location FiO2 (%)   05/04/18 1451 05/04/18 1533 05/04/18 1533 05/04/18 1533 --   Oral Monitor Sitting Left arm       Pain Score       05/04/18 1533       No Pain           Orthostatic Vital Signs  Vitals:    05/04/18 1430 05/04/18 1445 05/04/18 1533 05/04/18 1740   BP: 138/66  137/67 139/77   Pulse: 74 78 74 68   Patient Position - Orthostatic VS:   Sitting Sitting       Physical Exam   Constitutional: He is oriented to person, place, and time  He appears well-developed and well-nourished  HENT:   Head: Normocephalic and atraumatic  Eyes: Conjunctivae and EOM are normal  No scleral icterus  Neck: Normal range of motion  Neck supple  Cardiovascular: Normal rate and regular rhythm  No murmur heard  Pulmonary/Chest: He is in respiratory distress  He has wheezes  He has rales  He exhibits no tenderness  Abdominal: Soft  Bowel sounds are normal  There is no tenderness  Musculoskeletal: Normal range of motion  Neurological: He is alert and oriented to person, place, and time  Skin: Skin is warm and dry  Psychiatric: He has a normal mood and affect  His behavior is normal    Nursing note and vitals reviewed        ED Medications  Medications   sodium chloride 0 9 % bolus 1,000 mL (0 mL Intravenous Stopped 5/4/18 1230)     Followed by   sodium chloride 0 9 % bolus 1,000 mL ( Intravenous Not Given 5/4/18 1452)     Followed by   sodium chloride 0 9 % bolus 1,000 mL (1,000 mL Intravenous Not Given 5/4/18 1709)   acetaminophen (TYLENOL) tablet 975 mg (not administered)   aspirin (ECOTRIN LOW STRENGTH) EC tablet 81 mg (not administered)   doxazosin (CARDURA) tablet 4 mg (4 mg Oral Given 5/4/18 1719)   gabapentin (NEURONTIN) capsule 100 mg (not administered)   losartan (COZAAR) tablet 100 mg (100 mg Oral Given 5/4/18 1721)   fluticasone-salmeterol (ADVAIR) 250-50 mcg/dose inhaler 1 puff (not administered)   atorvastatin (LIPITOR) tablet 40 mg (40 mg Oral Given 5/4/18 1719)   allopurinol (ZYLOPRIM) tablet 100 mg (100 mg Oral Given 5/4/18 1719)   acetaminophen (TYLENOL) tablet 650 mg (not administered)   sodium chloride 0 9 % infusion (100 mL/hr Intravenous New Bag 5/4/18 1719)   ondansetron (ZOFRAN) injection 4 mg (not administered)   levalbuterol (XOPENEX) inhalation solution 1 25 mg (1 25 mg Nebulization Given 5/4/18 1902)   ipratropium (ATROVENT) 0 02 % inhalation solution 0 5 mg (0 5 mg Nebulization Given 5/4/18 1902)   methylPREDNISolone sodium succinate (Solu-MEDROL) injection 40 mg (40 mg Intravenous Given 5/4/18 1719)   cefTRIAXone (ROCEPHIN) IVPB (premix) 1,000 mg (not administered)     And   azithromycin (ZITHROMAX) 500 mg in sodium chloride 0 9% 250mL IVPB 500 mg (not administered)   enoxaparin (LOVENOX) subcutaneous injection 40 mg (not administered)   aluminum-magnesium hydroxide-simethicone (MYLANTA) 200-200-20 mg/5 mL oral suspension 15 mL (not administered)   guaiFENesin (MUCINEX) 12 hr tablet 1,200 mg (1,200 mg Oral Given 5/4/18 1719)   albuterol inhalation solution 10 mg (10 mg Nebulization Given 5/4/18 1108)     And   ipratropium (ATROVENT) 0 02 % inhalation solution 1 mg (1 mg Nebulization Given 5/4/18 1108)     And   sodium chloride 0 9 % inhalation solution 3 mL (3 mL Nebulization Given 5/4/18 1108)   methylPREDNISolone sodium succinate (Solu-MEDROL) injection 125 mg (125 mg Intravenous Given 5/4/18 1141)   acetaminophen (TYLENOL) tablet 650 mg (650 mg Oral Given 5/4/18 1143)   cefTRIAXone (ROCEPHIN) IVPB (premix) 1,000 mg (0 mg Intravenous Stopped 5/4/18 1200)   azithromycin (ZITHROMAX) 500 mg in sodium chloride 0 9% 250mL IVPB 500 mg (0 mg Intravenous Stopped 5/4/18 1459)       Diagnostic Studies  Results Reviewed     Procedure Component Value Units Date/Time    Lactic Acid x2 [[id number]]  (Normal) Collected:  05/04/18 1105    Lab Status:  Final result Specimen:  Blood from Arm, Left Updated:  05/04/18 1135     LACTIC ACID 0 8 mmol/L     Narrative:         Result may be elevated if tourniquet was used during collection  Comprehensive metabolic panel [[id number]]  (Abnormal) Collected:  05/04/18 1055    Lab Status:  Final result Specimen:  Blood from Arm, Left Updated:  05/04/18 1129     Sodium 137 mmol/L      Potassium 4 0 mmol/L      Chloride 103 mmol/L      CO2 26 mmol/L      Anion Gap 8 mmol/L      BUN 13 mg/dL      Creatinine 1 34 (H) mg/dL      Glucose 110 mg/dL      Calcium 8 1 (L) mg/dL      AST 16 U/L      ALT 19 U/L      Alkaline Phosphatase 49 U/L      Total Protein 7 2 g/dL      Albumin 3 6 g/dL      Total Bilirubin 0 50 mg/dL      eGFR 58 ml/min/1 73sq m     Narrative:         National Kidney Disease Education Program recommendations are as follows:  GFR calculation is accurate only with a steady state creatinine  Chronic Kidney disease less than 60 ml/min/1 73 sq  meters  Kidney failure less than 15 ml/min/1 73 sq  meters      BNP [[id number]]  (Abnormal) Collected:  05/04/18 1055    Lab Status:  Final result Specimen:  Blood from Arm, Left Updated:  05/04/18 1129     NT-proBNP 302 (H) pg/mL     Protime-INR [[id number]]  (Normal) Collected:  05/04/18 1105    Lab Status:  Final result Specimen:  Blood from Arm, Left Updated:  05/04/18 1129     Protime 14 3 seconds      INR 1 11    APTT [[id number]]  (Normal) Collected:  05/04/18 1105    Lab Status:  Final result Specimen:  Blood from Arm, Left Updated:  05/04/18 1129     PTT 32 seconds     Troponin I [[id number]]  (Normal) Collected:  05/04/18 1055    Lab Status:  Final result Specimen:  Blood from Arm, Left Updated:  05/04/18 1128     Troponin I <0 02 ng/mL     Narrative:         [employer] Chemistry analyzer 99% cutoff is > 0 04 ng/mL in network labs    o cTnI 99% cutoff is useful only when applied to patients in the clinical setting of myocardial ischemia  o cTnI 99% cutoff should be interpreted in the context of clinical history, ECG findings and possibly cardiac imaging to establish correct diagnosis  o cTnI 99% cutoff may be suggestive but clearly not indicative of a coronary event without the clinical setting of myocardial ischemia  Blood culture #2 [[id number]] Collected:  05/04/18 1105    Lab Status: In process Specimen:  Blood from Arm, Left Updated:  05/04/18 1111    Blood culture #1 [[id number]] Collected:  05/04/18 1105    Lab Status: In process Specimen:  Blood from Arm, Left Updated:  05/04/18 1111    CBC and differential [[id number]]  (Abnormal) Collected:  05/04/18 1055    Lab Status:  Final result Specimen:  Blood from Arm, Left Updated:  05/04/18 1109     WBC 6 56 Thousand/uL      RBC 4 63 Million/uL      Hemoglobin 13 9 g/dL      Hematocrit 41 9 %      MCV 91 fL      MCH 30 0 pg      MCHC 33 2 g/dL      RDW 13 3 %      MPV 10 9 fL      Platelets 855 Thousands/uL      nRBC 0 /100 WBCs      Neutrophils Relative 67 %      Lymphocytes Relative 19 %      Monocytes Relative 13 (H) %      Eosinophils Relative 1 %      Basophils Relative 0 %      Neutrophils Absolute 4 40 Thousands/µL      Lymphocytes Absolute 1 24 Thousands/µL      Monocytes Absolute 0 86 Thousand/µL      Eosinophils Absolute 0 04 Thousand/µL      Basophils Absolute 0 01 Thousands/µL                  XR chest portable   ED Interpretation by Corey Cabello MD (05/04 1118)   MOUNA infiltrate      Final Result by Timur Dee MD (05/04 1152)      Pulmonary vascular congestion  Patchy bilateral pulmonary opacities, most consistent with pulmonary edema or, less likely, pneumonia              Workstation performed: [id number]               Procedures  Procedures      Phone Consults  ED Phone Contact    ED Course  ED Course as of May 04 1905   Fri May 04, 2018   1140 LACTIC ACID: 0 8   1140 NT-proBNP: (!) 302   1140 WBC: 6 56   1140 Creatinine: (!) 1 34                         Initial Sepsis Screening     Row Name 05/04/18 1555 05/04/18 1325             Is the patient's history suggestive of a new or worsening infection? (!)  Yes (Proceed)  -RG (!)  Yes (Proceed)  -       Suspected source of infection pneumonia  -RG pneumonia  -       Are two or more of the following signs & symptoms of infection both present and new to the patient?  -- (!)  Yes (Proceed)  -       Indicate SIRS criteria Hyperthemia > 38 3C (100 9F)  -RG Hyperthemia > 38 3C (100 9F); Tachypnea > 20 resp per min  -       If the answer is yes to both questions, suspicion of sepsis is present  --  --       If severe sepsis is present AND tissue hypoperfusion perists in the hour after fluid resuscitation or lactate > 4, the patient meets criteria for SEPTIC SHOCK  --  --       Are any of the following organ dysfunction criteria present within 6 hours of suspected infection and SIRS criteria that are NOT considered to be chronic conditions?  No  -RG (!)  Yes  -       Organ dysfunction  -- Acute respiratory failure (new need for invasive or non-invasive mechanical ventilation)  -       Date of presentation of severe sepsis  --  --       Time of presentation of severe sepsis  --  --       Tissue hypoperfusion persists in the hour after crystalloid fluid administration, evidenced, by either:  --  --       Was hypotension present within one hour of the conclusion of crystalloid fluid administration?  --  --       Date of presentation of septic shock  --  --       Time of presentation of septic shock  --  --         User Key  (r) = Recorded By, (t) = Taken By, (c) = Cosigned By    [address] Name Provider Type    RG Amelia Valencia DO Physician     Karissa Rust MD Resident           Default Flowsheet Data (last 720 hours)      Sepsis Reassessment     Row Name 05/04/18 1326                   Volume Status and Tissue Perfusion Post Fluid Resuscitation- Must Document ALL of the Following:    Vital Signs Reviewed Yes  -        Cardio Normal S1/S2; Regular rate and rhythm; No murmor  -        Pulmonary (!)  Normal effort; Wheezes;Rales  -        Capillary Refill Brisk  -        Peripheral Pulses Radial  -        Peripheral Pulse +2  -        Skin Warm;Dry  -           *OR*   Intensive Monitoring- Must Document Two * of the Following Four *:    Vital Signs Reviewed  --        * Central Venous Pressure (CVP or RAP)  --        * Central Venous Oxygen (SVO2, ScvO2 or Oxygen saturation via central catheter)  --        * Bedside Cardiovascular US in IVC diameter and % collapse  --        * Passive Leg Raise OR Crystalloid Challenge  --          User Key  (r) = Recorded By, (t) = Taken By, (c) = Cosigned By    Initials Name Provider Type    MICHELLE Stein MD Resident                MDM  Number of Diagnoses or Management Options  COPD with acute exacerbation [facility]): new and requires workup  Pneumonia: new and requires workup  Sepsis [facility]): new and requires workup  Diagnosis management comments: 52-year-old male presenting in respiratory distress with fever to 103 hypoxic on room air will obtain septic labs as well as cardiac labs for most likely pneumonia versus COPD exacerbation, will likely admit the patient for sepsis  Patient's laboratories were suggestive of COPD exacerbation however his febrile presentation and patchy infiltrates in chest x-ray concerning for early pneumonia, patient started on ceftriaxone azithromycin    Initially patient requested to be discharged AMA despite the risk of death however after being taken off BiPAP he felt increasing shortness of breath and agreed to be admitted to the hospital        Amount and/or Complexity of Data Reviewed  Clinical lab tests: ordered and reviewed  Tests in the radiology section of CPT®: ordered and reviewed  Tests in the medicine section of CPT®: ordered and reviewed  Review and summarize past medical records: yes  Discuss the patient with other providers: yes  Independent visualization of images, tracings, or specimens: yes      CritCare Time    Disposition  Final diagnoses:   COPD with acute exacerbation ([facility] [address] )   Pneumonia   Sepsis ([facility] )     Time reflects when diagnosis was documented in both MDM as applicable and the Disposition within this note     Time User Action Codes Description Comment    5/4/2018 12:55 PM Newington Pastel Add [J44 1] COPD with acute exacerbation ([facility] )     5/4/2018 12:55 PM Newington Pastel Add [J18 9] Pneumonia     5/4/2018 12:55 PM Newington Pastel Add [A41 9] Sepsis ([facility] [address] )     5/4/2018 12:55 PM Newington Pastel Modify [J44 1] COPD with acute exacerbation ([facility] )     5/4/2018 12:55 PM Newington Pastel Modify [J18 9] Pneumonia     5/4/2018 12:55 PM Newington Pastel Modify [J18 9] Pneumonia     5/4/2018 12:55 PM Newington Pastel Modify [A41 9] Sepsis [facility])       ED Disposition     ED Disposition Condition Comment    Admit  Case was discussed with Dr Valente Santos and the patient's admission status was agreed to be Admission Status: inpatient status to the service of Dr Valente Santos           Follow-up Information    None       Current Discharge Medication List      CONTINUE these medications which have NOT CHANGED    Details   acetaminophen (TYLENOL) 325 mg tablet Take 3 tablets (975 mg total) by mouth 2 (two) times a day as needed for mild pain  Qty: 180 tablet, Refills: 3    Associated Diagnoses: Gouty arthritis      allopurinol (ZYLOPRIM) 100 mg tablet Take 1 tablet (100 mg total) by mouth 3 (three) times a day  Qty: 90 tablet, Refills: 3    Associated Diagnoses: Gouty arthritis      atorvastatin (LIPITOR) 40 mg tablet       CVS ASPIRIN LOW DOSE 81 MG EC tablet       doxazosin (CARDURA) 4 mg tablet TAKE 1 TABLET BY MOUTH DAILY  Qty: 30 tablet, Refills: 6    Associated Diagnoses: Hypertension, unspecified type      gabapentin (NEURONTIN) 100 mg capsule Take 1 capsule (100 mg total) by mouth 3 (three) times a day  Qty: 90 capsule, Refills: 0    Associated Diagnoses: Gouty arthritis      hydrochlorothiazide (HYDRODIURIL) 25 mg tablet       losartan (COZAAR) 100 MG tablet       Mometasone Furo-Formoterol Fum (DULERA) 100-5 MCG/ACT AERO Inhale 1 puff 2 (two) times a day  Qty: 1 Inhaler, Refills: 4    Associated Diagnoses: COPD (chronic obstructive pulmonary disease) ([facility])      VENTOLIN  (90 Base) MCG/ACT inhaler INHALE 2 PUFFS EVERY 4-6 HOURS AS NEEDED  Qty: 18 Inhaler, Refills: 0    Associated Diagnoses: Chronic obstructive pulmonary disease, unspecified COPD type ([facility] )           No discharge procedures on file  ED Provider  Attending physically available and evaluated Aloma Minors  I managed the patient along with the ED Attending      Electronically Signed by         Ronaldo Blanco MD  05/04/18 5094

## 2018-05-04 NOTE — ED NOTES
Pt now agrees to stay, placed on O2 4liter NC , states he feels good with this , O2 sats 91%     Heidi Hoffman, RN  05/04/18 6895

## 2018-05-04 NOTE — PROGRESS NOTES
Pt received from ER on non- re breather  Called respiratory and made aware of switching patient over to 6L on nasal canula, agreed and will come to eval patient

## 2018-05-04 NOTE — RESPIRATORY THERAPY NOTE
RT Protocol Note  Jesica Jackson 59 y o  male MRN: [id number]  Unit/Bed#: [facility] 401-01 Encounter: [id number]    Assessment    Principal Problem:    Acute respiratory failure with hypoxia and hypercapnia (HCC)  Active Problems:    DAVID (obstructive sleep apnea)    CAD (coronary artery disease)    COPD with acute exacerbation ([place] [address] )    Community acquired pneumonia    Gout    Hyperlipidemia      Home Pulmonary Medications:  Albuterol MDI prn  Home Devices/Therapy: (P) BiPAP/CPAP    Past Medical History:   Diagnosis Date    CAD (coronary artery disease)     [facility]     Gout     Hyperlipidemia     Hypertension     Orthopnea     last assessed: 8/17/16    Pericardial effusion     Sleep apnea      Social History     Social History    Marital status: Single     Spouse name: N/A    Number of children: 3    Years of education: N/A     Occupational History    Unemployed, not looking for work      Social History Main Topics    Smoking status: Former Smoker     Packs/day: 0 20     Years: 46 00     Types: Cigarettes     Quit date: 6/30/2015    Smokeless tobacco: Never Used    Alcohol use No      Comment: quit 3 years ago   Drug use: No    Sexual activity: No      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[employer]  Other Topics Concern    None     Social History Narrative     as per [employer]           Subjective         Objective    Physical Exam:   Assessment Type: (P) Assess only  General Appearance: (P) Alert, Awake  Respiratory Pattern: (P) Dyspnea with exertion  Chest Assessment: (P) Chest expansion symmetrical  Bilateral Breath Sounds: (P) Expiratory wheezes    Vitals:  Blood pressure 137/67, pulse 74, temperature 97 5 °F (36 4 °C), temperature source Axillary, resp  rate 20, height 5' 9" (1 753 m), weight 116 kg (255 lb 15 3 oz), SpO2 95 %  Imaging and other studies: I have personally reviewed pertinent reports  Plan    Respiratory Plan: (P) Home Bronchodilator Patient pathway        Resp Comments: (P) Pt has a rescue inhaler at home, no other pulm meds  He curently has some wheezing but is resting comfortably  I will cont  UDN TID as ordered by the MD BOLDEN D/C'd as pt will have atrovent to dilute xopenex

## 2018-05-04 NOTE — ED ATTENDING ATTESTATION
Tana Martin MD, saw and evaluated the patient  I have discussed the patient with the resident/non-physician practitioner and agree with the resident's/non-physician practitioner's findings, Plan of Care, and MDM as documented in the resident's/non-physician practitioner's note, except where noted  All available labs and Radiology studies were reviewed  At this point I agree with the current assessment done in the Emergency Department  I have conducted an independent evaluation of this patient including a focused history and a physical exam      61-year-old male seen on arrival to the emergency department with the resident  Patient was brought back emergently from triage for fever of 103 with hypoxia with oxygen saturation of 71% on room air  Patient was placed on a monitor in the resuscitation room, oxygen saturation of 71% on room air was verified, the patient was placed on 3 L nasal cannula with improvement of his oxygen saturation only to 76%, and with dialing up to 5 L nasal cannula oxygen saturation remained at 82%  Patient was placed on 100% non-rebreather pending breathing treatment and BiPAP  IV access was obtained, lab work for sepsis, portable chest x-ray performed  History was eventually obtained where the patient reports that he has been feeling not well for the past 2 days  Patient reports that he was increasingly fatigued and short of breath today  Patient denies cough, chest pain, new lower extremity pain or swelling  Patient has no known history of CHF, but reports that he has had known coronary artery disease in the past     No dysuria  No known skin rashes  Ten systems reviewed negative except as noted in the history of present illness  On examination patient initially was in moderate respiratory distress  Head is normocephalic and atraumatic  Eyelids lashes normal   Mucous membranes are dry  Neck is supple    Lungs were expiratory wheezing bilaterally all lung montague  Heart was tachycardic with no murmurs rubs or gallops  Abdomen is obese, soft, nontender  Extremities are unremarkable  Distal pulses intact  GCS is 15, cranial nerves grossly intact, and motor grossly intact  No skin rashes appreciated  Capillary refill less than 2 seconds  24-year-old male presenting with fever and hypoxia  Likely pneumonia  Patient has multiple criteria of SIRS making him septic  Patient will have IV access, septic lab, chest x-ray, BNP, early antibiotics, CPAP, steroids for COPD exacerbation, Heart neb  Labs Reviewed   COMPREHENSIVE METABOLIC PANEL - Abnormal        Result Value Ref Range Status    Sodium 137  136 - 145 mmol/L Final    Potassium 4 0  3 5 - 5 3 mmol/L Final    Chloride 103  100 - 108 mmol/L Final    CO2 26  21 - 32 mmol/L Final    Anion Gap 8  4 - 13 mmol/L Final    BUN 13  5 - 25 mg/dL Final    Creatinine 1 34 (*) 0 60 - 1 30 mg/dL Final    Comment: Standardized to IDMS reference method    Glucose 110  65 - 140 mg/dL Final    Comment:   If the patient is fasting, the ADA then defines impaired fasting glucose as > 100 mg/dL and diabetes as > or equal to 123 mg/dL  Specimen collection should occur prior to Sulfasalazine administration due to the potential for falsely depressed results  Specimen collection should occur prior to Sulfapyridine administration due to the potential for falsely elevated results  Calcium 8 1 (*) 8 3 - 10 1 mg/dL Final    AST 16  5 - 45 U/L Final    Comment:   Specimen collection should occur prior to Sulfasalazine administration due to the potential for falsely depressed results  ALT 19  12 - 78 U/L Final    Comment:   Specimen collection should occur prior to Sulfasalazine and/or Sulfapyridine administration due to the potential for falsely depressed results       Alkaline Phosphatase 49  46 - 116 U/L Final    Total Protein 7 2  6 4 - 8 2 g/dL Final    Albumin 3 6  3 5 - 5 0 g/dL Final    Total Bilirubin 0 50  0 20 - 1 00 mg/dL Final    eGFR 58  ml/min/1 73sq m Final    Narrative:     National Kidney Disease Education Program recommendations are as follows:  GFR calculation is accurate only with a steady state creatinine  Chronic Kidney disease less than 60 ml/min/1 73 sq  meters  Kidney failure less than 15 ml/min/1 73 sq  meters  CBC AND DIFFERENTIAL - Abnormal     WBC 6 56  4 31 - 10 16 Thousand/uL Final    RBC 4 63  3 88 - 5 62 Million/uL Final    Hemoglobin 13 9  12 0 - 17 0 g/dL Final    Hematocrit 41 9  36 5 - 49 3 % Final    MCV 91  82 - 98 fL Final    MCH 30 0  26 8 - 34 3 pg Final    MCHC 33 2  31 4 - 37 4 g/dL Final    RDW 13 3  11 6 - 15 1 % Final    MPV 10 9  8 9 - 12 7 fL Final    Platelets 420  463 - 390 Thousands/uL Final    nRBC 0  /100 WBCs Final    Neutrophils Relative 67  43 - 75 % Final    Lymphocytes Relative 19  14 - 44 % Final    Monocytes Relative 13 (*) 4 - 12 % Final    Eosinophils Relative 1  0 - 6 % Final    Basophils Relative 0  0 - 1 % Final    Neutrophils Absolute 4 40  1 85 - 7 62 Thousands/µL Final    Lymphocytes Absolute 1 24  0 60 - 4 47 Thousands/µL Final    Monocytes Absolute 0 86  0 17 - 1 22 Thousand/µL Final    Eosinophils Absolute 0 04  0 00 - 0 61 Thousand/µL Final    Basophils Absolute 0 01  0 00 - 0 10 Thousands/µL Final   NT-BNP PRO (BRAIN NATRIURETIC PEPTIDE) - Abnormal     NT-proBNP 302 (*) <125 pg/mL Final   TROPONIN I - Normal    Troponin I <0 02  <=0 04 ng/mL Final    Narrative:     [employer] Chemistry analyzer 99% cutoff is > 0 04 ng/mL in network labs    o cTnI 99% cutoff is useful only when applied to patients in the clinical setting of myocardial ischemia  o cTnI 99% cutoff should be interpreted in the context of clinical history, ECG findings and possibly cardiac imaging to establish correct diagnosis  o cTnI 99% cutoff may be suggestive but clearly not indicative of a coronary event without the clinical setting of myocardial ischemia     LACTIC ACID, PLASMA - Normal    LACTIC ACID 0 8  0 5 - 2 0 mmol/L Final    Narrative:     Result may be elevated if tourniquet was used during collection  PROTIME-INR - Normal    Protime 14 3  12 1 - 14 4 seconds Final    INR 1 11  0 86 - 1 16 Final   APTT - Normal    PTT 32  23 - 35 seconds Final    Comment: Therapeutic Heparin Range =  60-90 seconds   BLOOD CULTURE   BLOOD CULTURE   LACTIC ACID, PLASMA     XR chest portable   ED Interpretation   MOUNA infiltrate      Final Result      Pulmonary vascular congestion  Patchy bilateral pulmonary opacities, most consistent with pulmonary edema or, less likely, pneumonia  Workstation performed: [id number]               Total critical care time spent in the initial evaluation this patient, following up studies, reassessing the patient after therapies equals 46 minutes

## 2018-05-05 LAB
ALBUMIN SERPL BCP-MCNC: 3.4 G/DL (ref 3.5–5)
ALP SERPL-CCNC: 48 U/L (ref 46–116)
ALT SERPL W P-5'-P-CCNC: 19 U/L (ref 12–78)
ANION GAP SERPL CALCULATED.3IONS-SCNC: 7 MMOL/L (ref 4–13)
AST SERPL W P-5'-P-CCNC: 15 U/L (ref 5–45)
BASOPHILS # BLD AUTO: 0.01 THOUSANDS/ΜL (ref 0–0.1)
BASOPHILS NFR BLD AUTO: 0 % (ref 0–1)
BILIRUB SERPL-MCNC: 0.4 MG/DL (ref 0.2–1)
BUN SERPL-MCNC: 15 MG/DL (ref 5–25)
CALCIUM SERPL-MCNC: 8.3 MG/DL (ref 8.3–10.1)
CHLORIDE SERPL-SCNC: 110 MMOL/L (ref 100–108)
CO2 SERPL-SCNC: 25 MMOL/L (ref 21–32)
CREAT SERPL-MCNC: 1.13 MG/DL (ref 0.6–1.3)
EOSINOPHIL # BLD AUTO: 0 THOUSAND/ΜL (ref 0–0.61)
EOSINOPHIL NFR BLD AUTO: 0 % (ref 0–6)
ERYTHROCYTE [DISTWIDTH] IN BLOOD BY AUTOMATED COUNT: 13.1 % (ref 11.6–15.1)
GFR SERPL CREATININE-BSD FRML MDRD: 71 ML/MIN/1.73SQ M
GLUCOSE SERPL-MCNC: 161 MG/DL (ref 65–140)
HCT VFR BLD AUTO: 45.9 % (ref 36.5–49.3)
HGB BLD-MCNC: 14.2 G/DL (ref 12–17)
LYMPHOCYTES # BLD AUTO: 0.95 THOUSANDS/ΜL (ref 0.6–4.47)
LYMPHOCYTES NFR BLD AUTO: 15 % (ref 14–44)
MCH RBC QN AUTO: 29.3 PG (ref 26.8–34.3)
MCHC RBC AUTO-ENTMCNC: 30.9 G/DL (ref 31.4–37.4)
MCV RBC AUTO: 95 FL (ref 82–98)
MONOCYTES # BLD AUTO: 0.34 THOUSAND/ΜL (ref 0.17–1.22)
MONOCYTES NFR BLD AUTO: 6 % (ref 4–12)
NEUTROPHILS # BLD AUTO: 4.89 THOUSANDS/ΜL (ref 1.85–7.62)
NEUTS SEG NFR BLD AUTO: 79 % (ref 43–75)
NRBC BLD AUTO-RTO: 0 /100 WBCS
PLATELET # BLD AUTO: 185 THOUSANDS/UL (ref 149–390)
PMV BLD AUTO: 11 FL (ref 8.9–12.7)
POTASSIUM SERPL-SCNC: 4.1 MMOL/L (ref 3.5–5.3)
PROT SERPL-MCNC: 7.3 G/DL (ref 6.4–8.2)
RBC # BLD AUTO: 4.85 MILLION/UL (ref 3.88–5.62)
SODIUM SERPL-SCNC: 142 MMOL/L (ref 136–145)
WBC # BLD AUTO: 6.2 THOUSAND/UL (ref 4.31–10.16)

## 2018-05-05 PROCEDURE — 94760 N-INVAS EAR/PLS OXIMETRY 1: CPT

## 2018-05-05 PROCEDURE — 80053 COMPREHEN METABOLIC PANEL: CPT | Performed by: INTERNAL MEDICINE

## 2018-05-05 PROCEDURE — 85025 COMPLETE CBC W/AUTO DIFF WBC: CPT | Performed by: INTERNAL MEDICINE

## 2018-05-05 PROCEDURE — 87070 CULTURE OTHR SPECIMN AEROBIC: CPT | Performed by: INTERNAL MEDICINE

## 2018-05-05 PROCEDURE — 94660 CPAP INITIATION&MGMT: CPT

## 2018-05-05 PROCEDURE — 99232 SBSQ HOSP IP/OBS MODERATE 35: CPT | Performed by: INTERNAL MEDICINE

## 2018-05-05 PROCEDURE — 87205 SMEAR GRAM STAIN: CPT | Performed by: INTERNAL MEDICINE

## 2018-05-05 PROCEDURE — 94640 AIRWAY INHALATION TREATMENT: CPT

## 2018-05-05 RX ADMIN — AZITHROMYCIN MONOHYDRATE 500 MG: 500 INJECTION, POWDER, LYOPHILIZED, FOR SOLUTION INTRAVENOUS at 12:19

## 2018-05-05 RX ADMIN — IPRATROPIUM BROMIDE 0.5 MG: 0.5 SOLUTION RESPIRATORY (INHALATION) at 13:23

## 2018-05-05 RX ADMIN — GUAIFENESIN 1200 MG: 600 TABLET, EXTENDED RELEASE ORAL at 08:22

## 2018-05-05 RX ADMIN — ALLOPURINOL 100 MG: 100 TABLET ORAL at 17:13

## 2018-05-05 RX ADMIN — ALLOPURINOL 100 MG: 100 TABLET ORAL at 08:22

## 2018-05-05 RX ADMIN — LEVALBUTEROL HYDROCHLORIDE 1.25 MG: 1.25 SOLUTION, CONCENTRATE RESPIRATORY (INHALATION) at 13:23

## 2018-05-05 RX ADMIN — IPRATROPIUM BROMIDE 0.5 MG: 0.5 SOLUTION RESPIRATORY (INHALATION) at 07:14

## 2018-05-05 RX ADMIN — ENOXAPARIN SODIUM 40 MG: 40 INJECTION SUBCUTANEOUS at 08:22

## 2018-05-05 RX ADMIN — DOXAZOSIN 4 MG: 4 TABLET ORAL at 17:13

## 2018-05-05 RX ADMIN — SODIUM CHLORIDE 100 ML/HR: 0.9 INJECTION, SOLUTION INTRAVENOUS at 02:00

## 2018-05-05 RX ADMIN — LEVALBUTEROL HYDROCHLORIDE 1.25 MG: 1.25 SOLUTION, CONCENTRATE RESPIRATORY (INHALATION) at 20:43

## 2018-05-05 RX ADMIN — ALLOPURINOL 100 MG: 100 TABLET ORAL at 12:19

## 2018-05-05 RX ADMIN — ASPIRIN 81 MG: 81 TABLET, COATED ORAL at 08:22

## 2018-05-05 RX ADMIN — LOSARTAN POTASSIUM 100 MG: 50 TABLET ORAL at 08:22

## 2018-05-05 RX ADMIN — FLUTICASONE PROPIONATE AND SALMETEROL 1 PUFF: 50; 250 POWDER RESPIRATORY (INHALATION) at 08:22

## 2018-05-05 RX ADMIN — METHYLPREDNISOLONE SODIUM SUCCINATE 40 MG: 40 INJECTION, POWDER, FOR SOLUTION INTRAMUSCULAR; INTRAVENOUS at 01:54

## 2018-05-05 RX ADMIN — METHYLPREDNISOLONE SODIUM SUCCINATE 40 MG: 40 INJECTION, POWDER, FOR SOLUTION INTRAMUSCULAR; INTRAVENOUS at 20:38

## 2018-05-05 RX ADMIN — IPRATROPIUM BROMIDE 0.5 MG: 0.5 SOLUTION RESPIRATORY (INHALATION) at 20:43

## 2018-05-05 RX ADMIN — GABAPENTIN 100 MG: 100 CAPSULE ORAL at 21:22

## 2018-05-05 RX ADMIN — ALLOPURINOL 100 MG: 100 TABLET ORAL at 21:22

## 2018-05-05 RX ADMIN — GUAIFENESIN 1200 MG: 600 TABLET, EXTENDED RELEASE ORAL at 17:13

## 2018-05-05 RX ADMIN — ATORVASTATIN CALCIUM 40 MG: 40 TABLET, FILM COATED ORAL at 17:13

## 2018-05-05 RX ADMIN — FLUTICASONE PROPIONATE AND SALMETEROL 1 PUFF: 50; 250 POWDER RESPIRATORY (INHALATION) at 20:39

## 2018-05-05 RX ADMIN — CEFTRIAXONE 1000 MG: 1 INJECTION, SOLUTION INTRAVENOUS at 12:21

## 2018-05-05 RX ADMIN — METHYLPREDNISOLONE SODIUM SUCCINATE 40 MG: 40 INJECTION, POWDER, FOR SOLUTION INTRAMUSCULAR; INTRAVENOUS at 12:12

## 2018-05-05 RX ADMIN — LEVALBUTEROL HYDROCHLORIDE 1.25 MG: 1.25 SOLUTION, CONCENTRATE RESPIRATORY (INHALATION) at 07:14

## 2018-05-05 NOTE — ASSESSMENT & PLAN NOTE
Condition much improved  Patient will continue on Xopenex and Atrovent nebulization, IV steroids Advair

## 2018-05-05 NOTE — ASSESSMENT & PLAN NOTE
Continued broad-spectrum antibiotic coverage  (IV Rocephin + IV Zithromax-- day 2)  DC IV fluids  Follow-up blood culture x2, sputum culture results

## 2018-05-05 NOTE — CASE MANAGEMENT
Thank you,  [address] [facility] in the [facility] by Sam James for 2017  Network Utilization Review Department  Phone: [phone]; Fax [phone]  ATTENTION: The Network Utilization Review Department is now centralized for our 7 Facilities  Make a note that we have a new phone and fax numbers for our Department  Please call with any questions or concerns to [phone] and carefully follow the prompts so that you are directed to the right person  All voicemails are confidential  Fax any determinations, approvals, denials, and requests for initial or continue stay review clinical to [phone]  Due to HIGH CALL volume, it would be easier if you could please send faxed requests to expedite your requests and in part, help us provide discharge notifications faster     ========================================================    Initial Clinical Review    Admission: Date/Time/Statement: 5/4/18 @ 1255   Orders Placed This Encounter   Procedures    Inpatient Admission (expected length of stay for this patient is greater than two midnights)     Standing Status:   Standing     Number of Occurrences:   1     Order Specific Question:   Admitting Physician     Answer:   Jasen Morales [[id number]]     Order Specific Question:   Level of Care     Answer:   Level 2 Stepdown / HOT [14]     Order Specific Question:   Estimated length of stay     Answer:   More than 2 Midnights     Order Specific Question:   Certification     Answer:   I certify that inpatient services are medically necessary for this patient for a duration of greater than two midnights  See H&P and MD Progress Notes for additional information about the patient's course of treatment           ED: Date/Time/Mode of Arrival:   ED Arrival Information     Expected Arrival Acuity Means of Arrival Escorted By Service Admission Type    - 5/4/2018 10:23 Emergent [address] [facility] Emergency    Arrival Complaint SOB          Chief Complaint:   Chief Complaint   Patient presents with    Shortness of Breath     fever chills       History of Illness:   Victor M Campbell is a 59 y o  male who presents with increased shortness of breath and wheezing  The patient states that he started having increased wheezing about 3 days ago but did not notice much of an increase in his coughing which is somewhat chronic  However, he did feel some chest congestion  The patient at times felt cold but did not take his temperature at home to determine if he had any fevers or not  The patient has had some increased dizziness and lightheadedness over the last 3 days  The patient states that this morning he felt funny and felt that his chest was tight and he was a bit more short of breath to the point that he felt that he needed a nebulizer treatment in the emergency department, stating that I just know when I need a nebulizer treatment  Therefore, at 6:30 a m  the patient recalls leaving his home to try to come to the emergency department  However, the patient did not come into the emergency department until 10:00 a m  he states  The patient thinks that there is a possibility that he slept in his car in the parking lot of the hospital but does not even recall driving here  When the patient is brought into the emergency department it is noted that his oxygen levels are hypoxic initially with oxygen saturations in the low 80% range  The patient was given nebulizer treatments and initially was placed on BiPAP in the emergency department      ED Vital Signs:   ED Triage Vitals   Temperature Pulse Respirations Blood Pressure SpO2   05/04/18 1100 05/04/18 1115 05/04/18 1450 05/04/18 1230 05/04/18 1109   (!) 103 °F (39 4 °C) 88 18 123/62 98 %      Temp Source Heart Rate Source Patient Position - Orthostatic VS BP Location FiO2 (%)   05/04/18 1451 05/04/18 1533 05/04/18 1533 05/04/18 1533 --   Oral Monitor Sitting Left arm       Pain Score 18 1533       No Pain        Wt Readings from Last 1 Encounters:   18 116 kg (255 lb 15 3 oz)     O2 @ 6L via NC    Abnormal Labs/Diagnostic Test Results:  WBC Thousand/uL 6 56   HEMOGLOBIN g/dL 13 9   HEMATOCRIT % 41 9   PLATELETS Thousands/uL 166     SODIUM mmol/L 137   POTASSIUM mmol/L 4 0   CHLORIDE mmol/L 103   CO2 mmol/L 26   BUN mg/dL 13   CREATININE mg/dL 1 34*   CALCIUM mg/dL 8 1*   TOTAL PROTEIN g/dL 7 2   BILIRUBIN TOTAL mg/dL 0 50   ALK PHOS U/L 49   ALT U/L 19   AST U/L 16   GLUCOSE RANDOM mg/dL 110     Chest X:  Pulmonary vascular congestion  Patchy bilateral pulmonary opacities, most consistent with pulmonary edema or, less likely, pneumonia       EC, NSR    ED Treatment:   Medication Administration from 2018 1023 to 2018 1528       Date/Time Order Dose Route Action Action by Comments     2018 1108 albuterol inhalation solution 10 mg 10 mg Nebulization Given Pradeep Kelin, RT      2018 1108 ipratropium (ATROVENT) 0 02 % inhalation solution 1 mg 1 mg Nebulization Given Pradeep Kelin, RT      2018 1108 sodium chloride 0 9 % inhalation solution 3 mL 3 mL Nebulization Given Elenaranjit Mcdonaldus Laros, RT      2018 1230 sodium chloride 0 9 % bolus 1,000 mL 0 mL Intravenous Stopped Frankie Alvarez, MILAGROS      2018 1104 sodium chloride 0 9 % bolus 1,000 mL 1,000 mL Intravenous New Bag Frankie Alvarez, MILAGROS      2018 1452 sodium chloride 0 9 % bolus 1,000 mL   Intravenous Not Given Frankie Alvarez, RN only 2 liters given , lactic acid negative     2018 1141 methylPREDNISolone sodium succinate (Solu-MEDROL) injection 125 mg 125 mg Intravenous Given Frankie Alvarez, MILAGROS      2018 1143 acetaminophen (TYLENOL) tablet 650 mg 650 mg Oral Given Frankie Alvarez, RN      2018 1200 cefTRIAXone (ROCEPHIN) IVPB (premix) 1,000 mg 0 mg Intravenous Stopped Frankie Alvarez, MILAGROS      2018 1142 cefTRIAXone (ROCEPHIN) IVPB (premix) 1,000 mg 1,000 mg Intravenous New Bag Dionte Herndon RN      05/04/2018 1459 azithromycin (ZITHROMAX) 500 mg in sodium chloride 0 9% 250mL IVPB 500 mg 0 mg Intravenous Stopped Dionte Herndon RN      05/04/2018 1232 azithromycin (ZITHROMAX) 500 mg in sodium chloride 0 9% 250mL IVPB 500 mg 500 mg Intravenous New Bag Dionte Herndno RN           Past Medical/Surgical History: Active Ambulatory Problems     Diagnosis Date Noted    Acute respiratory failure with hypoxia and hypercapnia (HCC) 01/21/2016    Hypertension 01/21/2016    Tobacco use 01/21/2016    DAVID (obstructive sleep apnea) 01/21/2016    Respiratory acidosis 01/22/2016    Elevated troponin 01/22/2016    S/P cardiac cath 02/03/2016    CAD (coronary artery disease) 02/03/2016    COPD with acute exacerbation ([facility] ) 08/04/2016     Past Medical History:   Diagnosis Date    CAD (coronary artery disease)     COPD (chronic obstructive pulmonary disease) (HCC)     Gout     Hyperlipidemia     Hypertension     Orthopnea     Pericardial effusion     Sleep apnea        Admitting Diagnosis: Pneumonia [J18 9]  SOB (shortness of breath) [R06 02]  COPD with acute exacerbation (HCC) [J44 1]  Sepsis ([facility] ) [A41 9]    Age/Sex: 59 y o  male    Assessment/Plan:   Principal Problem:    Acute respiratory failure with hypoxia and hypercapnia (HCC)  Active Problems:    DAVID (obstructive sleep apnea)    CAD (coronary artery disease)    COPD with acute exacerbation ([facility] )    Community acquired pneumonia    Gout    Hyperlipidemia        Plan for the Primary Problem(s):  · Acute Hypoxic / Hypercapnic Respiratory Failure -   ? COPD with Acute Exacerbation -   ? Steroid - solumedrol 40 mg IV q8h  ? Nebulizer - Xopenex and Atrovent tid  ? Antitussive - Not currently indicated  ? Inhalers - formulary substitution for [facility] with advair  ? Should be set up with pulmonary follow up post hospitalization  ? Community Acquired Pneumonia -   ?  Antibiotic - Will Use Rocephin / Azithromycin  ? Mucolytic - Mucinex  ? Cultures - follow up blood cultures  Obtain sputum culture if possible  ? IV fluids  ? Tylenol for fevers  ? Supportive care  ? Oxygenation / Ventilation - will use non-rebreather currently, but wean down as tolerated with goal oxygen saturation 88-95%  CPAP at night  Plan for Additional Problems:   · Obstructive Sleep Apnea - Uses CPAP at night with setting 12 cm H2O   · Essential Hypertension - Continue home regimen  Monitor blood pressures  So far they are controlled  · Gout - Continue allopurinol  No flare currently  · Hyperlipidemia - continue home therapy      VTE Prophylaxis: Enoxaparin (Lovenox)  / sequential compression device   Anticipated Length of Stay:  Patient will be admitted on an Inpatient basis with an anticipated length of stay of  > 2 midnights     Justification for Hospital Stay: Evaluation for pneumonia, COPD Exacerbation with obstructive sleep apnea      Admission Orders:  Scheduled Meds:   Current Facility-Administered Medications:  acetaminophen 650 mg Oral Q6H PRN    acetaminophen 975 mg Oral BID PRN    allopurinol 100 mg Oral 4x Daily    aluminum-magnesium hydroxide-simethicone 15 mL Oral Q6H PRN    aspirin 81 mg Oral Daily    atorvastatin 40 mg Oral Daily With Dinner    cefTRIAXone 1,000 mg Intravenous Q24H Last Rate: 1,000 mg (05/05/18 1221)   And       azithromycin 500 mg Intravenous Q24H    doxazosin 4 mg Oral QPM    enoxaparin 40 mg Subcutaneous Daily    fluticasone-salmeterol 1 puff Inhalation Q12H [address]    gabapentin 100 mg Oral HS    guaiFENesin 1,200 mg Oral BID    ipratropium 0 5 mg Nebulization TID    levalbuterol 1 25 mg Nebulization TID    losartan 100 mg Oral Daily    methylPREDNISolone sodium succinate 40 mg Intravenous Q8H    ondansetron 4 mg Intravenous Q6H PRN    sodium chloride 1,000 mL Intravenous Once    Followed by       sodium chloride 1,000 mL Intravenous Once      Aspiration precautions  TELM  O2 @ 5L via NC  Elevate OOB > 30 degreees

## 2018-05-05 NOTE — PROGRESS NOTES
Progress Note Keerthi Neil 1960, 59 y o  male MRN: [id number]    Unit/Bed#: [facility] 401-01 Encounter: [id number]    Primary Care Provider: Lucio Sorensen DO   Date and time admitted to hospital: 2018 10:43 AM        * Acute respiratory failure with hypoxia and hypercapnia (HCC)   Assessment & Plan    Condition much improved  Patient will continue on Xopenex and Atrovent nebulization, IV steroids Advair  COPD with acute exacerbation [facility])   Assessment & Plan    Condition much improved  Patient is still requiring oxygen, he does not require home  Refer to above  Community acquired pneumonia   Assessment & Plan    Continued broad-spectrum antibiotic coverage  (IV Rocephin + IV Zithromax-- day 2)  DC IV fluids  Follow-up blood culture x2, sputum culture results  Hyperlipidemia   Assessment & Plan    Continue to atorvastatin  DAVID (obstructive sleep apnea)   Assessment & Plan    Continue CPAP at night  Gout   Assessment & Plan    Stable  Continue Zyloprim  Hypertension   Assessment & Plan    Blood pressure is slightly on the high side  Most likely secondary to COPD exacerbation  Continue losartan, add hydralazine as needed  CAD (coronary artery disease)   Assessment & Plan    No active issues  ______________________________________________________________________    SUBJECTIVE:   Patient seen and examined  Still appears short of breath with cough  Requiring 4 L oxygen by nasal cannula  Tolerating p o  diet  OBJECTIVE:     Vitals:   HR:  [64-88] 64  Resp:  [18-20] 20  BP: (110-168)/(55-95) 168/95  SpO2:  [88 %-100 %] 92 %  Temp (24hrs), Av 8 °F (37 1 °C), Min:97 5 °F (36 4 °C), Max:103 °F (39 4 °C)  Current: Temperature: 97 6 °F (36 4 °C)    Intake/Output Summary (Last 24 hours) at 18 0920  Last data filed at 18 0751   Gross per 24 hour   Intake          2548  33 ml   Output              975 ml   Net 1573 33 ml       Physical Exam:   GENERAL: AAO x 3, obese  HEENT: atraumatic, normocephalic  Oral mucosa moist, no icterus, pallor  PERRLA +  Neck supple, no JVD, no lymphadenopathy, no thryomegaly  CHEST: B/L breath sounds heard, extensive expiratory wheezing  CVS: S1, S2   ABDOMEN: Soft/obese/NT/BS heard  NEUROLOGICAL: CN II -XI grossly intact  No focal motor or sensory deficits  No signs of meningeal irritation or cerebellar dysfunction    EXTREMITIES:  Mild bilateral pitting pedal edema    LABS:  Results for Ok Henao (MRN [id number]) as of 5/5/2018 09:24   5/5/2018 04:37   Alkaline Phosphatase 48   Total Protein 7 3   Albumin 3 4 (L)   Total Bilirubin 0 40   eGFR 71   WBC 6 20   RBC 4 85   Hemoglobin 14 2   Hematocrit 45 9   MCV 95   MCH 29 3   MCHC 30 9 (L)   RDW 13 1   Platelets 444   MPV 76 9   nRBC 0   Neutrophils Relative 79 (H)   Lymphocytes Relative 15   Monocytes Relative 6   Basophils Relative 0   Neutrophils Absolute 4 89   Lymphocytes Absolute 0 95   Monocytes Absolute 0 34   Eosinophils Absolute 0 00   Basophils Absolute 0 01     Scheduled Meds:    Current Facility-Administered Medications:  acetaminophen 650 mg Oral Q6H PRN Lesta Peyer, DO   acetaminophen 975 mg Oral BID PRN Lesta Peyer, DO   allopurinol 100 mg Oral 4x Daily Slick Xiong, DO   aluminum-magnesium hydroxide-simethicone 15 mL Oral Q6H PRN Lesta Peyer, DO   aspirin 81 mg Oral Daily Lesta Peyer, DO   atorvastatin 40 mg Oral Daily With [employer], DO   cefTRIAXone 1,000 mg Intravenous Q24H Lesta Peyer, DO   And       azithromycin 500 mg Intravenous Q24H Lesta Peyer, DO   doxazosin 4 mg Oral QPM Lesta Peyer, DO   enoxaparin 40 mg Subcutaneous Daily Lesta Peyer, DO   fluticasone-salmeterol 1 puff Inhalation Q12H [facility] Slick Ibarra, DO   gabapentin 100 mg Oral HS Slick Xiong, DO   guaiFENesin 1,200 mg Oral BID Lesta Peyer, DO   ipratropium 0 5 mg Nebulization TID Kimo Magana Inga, DO   levalbuterol 1 25 mg Nebulization TID Gonzalo Shankar, DO   losartan 100 mg Oral Daily Gonzalo Shankar, DO   methylPREDNISolone sodium succinate 40 mg Intravenous Q8H Slick Sheriff, DO   ondansetron 4 mg Intravenous Q6H PRN Gonzalo Shankar, DO   sodium chloride 1,000 mL Intravenous Once Emanuel Lora MD   Followed by       sodium chloride 1,000 mL Intravenous Once Emanuel Lora MD     PRN Meds:    acetaminophen    acetaminophen    aluminum-magnesium hydroxide-simethicone    ondansetron      VTE Prophylaxis:  Lovenox  CODE STATUS: FULL      Patient Centered Rounds: I have discussed today's plans with nursing staff today  DISPOSITION:  Anticipate hospitalization for the next 3 days  He will need pulse ox on ambulation prior to discharge  Time Spent for Care: 20 minutes   More than 50% of total time spent on counseling and coordination of care as described above  Family will be updated  Richard Mccartney MD  HOSPITALIST SERVICES  5/5/2018    PLEASE NOTE:  This encounter was completed utilizing the [employer] Direct Speech Voice Recognition Software  Grammatical errors, random word insertions, pronoun errors and incomplete sentences are occasional consequences of the system due to software limitations, ambient noise and hardware issues  These may be missed by proof reading prior to affixing electronic signature  Any questions or concerns about the content, text or information contained within the body of this dictation should be directly addressed to the physician for clarification  Please do not hesitate to call me directly if you have any any questions or concerns

## 2018-05-05 NOTE — ASSESSMENT & PLAN NOTE
Condition much improved  Patient is still requiring oxygen, he does not require home  Refer to above

## 2018-05-06 PROBLEM — D72.829 LEUKOCYTOSIS: Status: ACTIVE | Noted: 2018-05-06

## 2018-05-06 LAB
ANION GAP SERPL CALCULATED.3IONS-SCNC: 5 MMOL/L (ref 4–13)
BUN SERPL-MCNC: 21 MG/DL (ref 5–25)
CALCIUM SERPL-MCNC: 8.2 MG/DL (ref 8.3–10.1)
CHLORIDE SERPL-SCNC: 112 MMOL/L (ref 100–108)
CO2 SERPL-SCNC: 28 MMOL/L (ref 21–32)
CREAT SERPL-MCNC: 0.98 MG/DL (ref 0.6–1.3)
ERYTHROCYTE [DISTWIDTH] IN BLOOD BY AUTOMATED COUNT: 13.4 % (ref 11.6–15.1)
GFR SERPL CREATININE-BSD FRML MDRD: 84 ML/MIN/1.73SQ M
GLUCOSE SERPL-MCNC: 136 MG/DL (ref 65–140)
HCT VFR BLD AUTO: 42.7 % (ref 36.5–49.3)
HGB BLD-MCNC: 13.8 G/DL (ref 12–17)
MCH RBC QN AUTO: 29.7 PG (ref 26.8–34.3)
MCHC RBC AUTO-ENTMCNC: 32.3 G/DL (ref 31.4–37.4)
MCV RBC AUTO: 92 FL (ref 82–98)
PLATELET # BLD AUTO: 194 THOUSANDS/UL (ref 149–390)
PMV BLD AUTO: 10.9 FL (ref 8.9–12.7)
POTASSIUM SERPL-SCNC: 4.6 MMOL/L (ref 3.5–5.3)
RBC # BLD AUTO: 4.65 MILLION/UL (ref 3.88–5.62)
SODIUM SERPL-SCNC: 145 MMOL/L (ref 136–145)
WBC # BLD AUTO: 16.01 THOUSAND/UL (ref 4.31–10.16)

## 2018-05-06 PROCEDURE — 85027 COMPLETE CBC AUTOMATED: CPT | Performed by: INTERNAL MEDICINE

## 2018-05-06 PROCEDURE — 99232 SBSQ HOSP IP/OBS MODERATE 35: CPT | Performed by: INTERNAL MEDICINE

## 2018-05-06 PROCEDURE — 94640 AIRWAY INHALATION TREATMENT: CPT

## 2018-05-06 PROCEDURE — 94760 N-INVAS EAR/PLS OXIMETRY 1: CPT

## 2018-05-06 PROCEDURE — 80048 BASIC METABOLIC PNL TOTAL CA: CPT | Performed by: INTERNAL MEDICINE

## 2018-05-06 RX ORDER — PREDNISONE 20 MG/1
40 TABLET ORAL DAILY
Status: DISCONTINUED | OUTPATIENT
Start: 2018-05-06 | End: 2018-05-07 | Stop reason: HOSPADM

## 2018-05-06 RX ORDER — ALBUTEROL SULFATE 90 UG/1
2 AEROSOL, METERED RESPIRATORY (INHALATION) EVERY 4 HOURS PRN
Status: DISCONTINUED | OUTPATIENT
Start: 2018-05-06 | End: 2018-05-07 | Stop reason: HOSPADM

## 2018-05-06 RX ADMIN — LEVALBUTEROL HYDROCHLORIDE 1.25 MG: 1.25 SOLUTION, CONCENTRATE RESPIRATORY (INHALATION) at 08:04

## 2018-05-06 RX ADMIN — METHYLPREDNISOLONE SODIUM SUCCINATE 40 MG: 40 INJECTION, POWDER, FOR SOLUTION INTRAMUSCULAR; INTRAVENOUS at 06:22

## 2018-05-06 RX ADMIN — CEFTRIAXONE 1000 MG: 1 INJECTION, SOLUTION INTRAVENOUS at 12:40

## 2018-05-06 RX ADMIN — AZITHROMYCIN MONOHYDRATE 500 MG: 500 INJECTION, POWDER, LYOPHILIZED, FOR SOLUTION INTRAVENOUS at 12:50

## 2018-05-06 RX ADMIN — PREDNISONE 40 MG: 20 TABLET ORAL at 09:21

## 2018-05-06 RX ADMIN — IPRATROPIUM BROMIDE 0.5 MG: 0.5 SOLUTION RESPIRATORY (INHALATION) at 19:05

## 2018-05-06 RX ADMIN — GABAPENTIN 100 MG: 100 CAPSULE ORAL at 21:42

## 2018-05-06 RX ADMIN — GUAIFENESIN 1200 MG: 600 TABLET, EXTENDED RELEASE ORAL at 17:46

## 2018-05-06 RX ADMIN — ASPIRIN 81 MG: 81 TABLET, COATED ORAL at 09:21

## 2018-05-06 RX ADMIN — IPRATROPIUM BROMIDE 0.5 MG: 0.5 SOLUTION RESPIRATORY (INHALATION) at 13:19

## 2018-05-06 RX ADMIN — IPRATROPIUM BROMIDE 0.5 MG: 0.5 SOLUTION RESPIRATORY (INHALATION) at 08:04

## 2018-05-06 RX ADMIN — GUAIFENESIN 1200 MG: 600 TABLET, EXTENDED RELEASE ORAL at 09:21

## 2018-05-06 RX ADMIN — ALLOPURINOL 100 MG: 100 TABLET ORAL at 21:42

## 2018-05-06 RX ADMIN — LOSARTAN POTASSIUM 100 MG: 50 TABLET ORAL at 09:21

## 2018-05-06 RX ADMIN — LEVALBUTEROL HYDROCHLORIDE 1.25 MG: 1.25 SOLUTION, CONCENTRATE RESPIRATORY (INHALATION) at 19:05

## 2018-05-06 RX ADMIN — FLUTICASONE PROPIONATE AND SALMETEROL 1 PUFF: 50; 250 POWDER RESPIRATORY (INHALATION) at 09:22

## 2018-05-06 RX ADMIN — DOXAZOSIN 4 MG: 4 TABLET ORAL at 17:47

## 2018-05-06 RX ADMIN — ALLOPURINOL 100 MG: 100 TABLET ORAL at 12:41

## 2018-05-06 RX ADMIN — ENOXAPARIN SODIUM 40 MG: 40 INJECTION SUBCUTANEOUS at 09:21

## 2018-05-06 RX ADMIN — LEVALBUTEROL HYDROCHLORIDE 1.25 MG: 1.25 SOLUTION, CONCENTRATE RESPIRATORY (INHALATION) at 13:19

## 2018-05-06 RX ADMIN — ATORVASTATIN CALCIUM 40 MG: 40 TABLET, FILM COATED ORAL at 17:47

## 2018-05-06 RX ADMIN — ALLOPURINOL 100 MG: 100 TABLET ORAL at 09:21

## 2018-05-06 RX ADMIN — ALLOPURINOL 100 MG: 100 TABLET ORAL at 17:46

## 2018-05-06 NOTE — PROGRESS NOTES
Progress Note Ashwin Garcia 1960, 59 y o  male MRN: [id number]    Unit/Bed#: [facility] 401-01 Encounter: [id number]    Primary Care Provider: Raúl Ortiz DO   Date and time admitted to hospital: 2018 10:43 AM        * Acute respiratory failure with hypoxia and hypercapnia (HCC)   Assessment & Plan    Condition much improved  Patient will continue on Xopenex and Atrovent nebulization, Advair  Transition IV Solu-Medrol to p o  prednisone today  Patient will be weaned off the oxygen  COPD with acute exacerbation [facility])   Assessment & Plan    Condition much improved  Refer to above  Community acquired pneumonia   Assessment & Plan    Continued broad-spectrum antibiotic coverage  (IV Rocephin + IV Zithromax-- day 3)  Follow-up blood culture x2, sputum culture -- no significant growth so far  Urine for Legionella antigen and Streptococcus pneumoniae antigen negative  Hyperlipidemia   Assessment & Plan    Continue to atorvastatin  DAVID (obstructive sleep apnea)   Assessment & Plan    Continue CPAP at night  Gout   Assessment & Plan    Stable  Continue Zyloprim  Hypertension   Assessment & Plan    Blood pressure is s tends to be on the high side in the mornings  Most likely secondary to COPD exacerbation  Continue losartan, add hydralazine as needed  CAD (coronary artery disease)   Assessment & Plan    No active issues  Leukocytosis   Assessment & Plan    This is secondary to IV steroids  CBC in a m             ______________________________________________________________________    SUBJECTIVE:   Patient seen and examined  He appears a lot better compared to yesterday  Wheezing is improved  He is down to 2 L oxygen by nasal cannula  Denies any symptoms  Tolerating p o  diet      OBJECTIVE:     Vitals:   HR:  [] 86  Resp:  [18-22] 22  BP: (134-169)/(72-90) 169/90  SpO2:  [92 %-97 %] 93 %  Temp (24hrs), Av 8 °F (36 6 °C), Min:97 5 °F (36 4 °C), Max:98 °F (36 7 °C)  Current: Temperature: 97 7 °F (36 5 °C)    Intake/Output Summary (Last 24 hours) at 05/06/18 0958  Last data filed at 05/06/18 0916   Gross per 24 hour   Intake             1005 ml   Output              500 ml   Net              505 ml       Physical Exam:   GENERAL: AAO x 3  HEENT: atraumatic, normocephalic  Oral mucosa moist, no icterus, pallor  PERRLA +  Neck supple, no JVD, no lymphadenopathy, no thryomegaly  CHEST: B/L breath sounds heard, occasional wheezing  CVS: S1, S2   ABDOMEN: Soft/obese/NT/BS heard  NEUROLOGICAL: CN II -XI grossly intact  No focal motor or sensory deficits  No signs of meningeal irritation or cerebellar dysfunction  EXTREMITIES: No cyanosis/clubbing or edema      LABS:  Results for Zuleika Caraballo (MRN [id number]) as of 5/6/2018 09:55   5/6/2018 09:14   WBC 16 01 (H)   RBC 4 65   Hemoglobin 13 8   Hematocrit 42 7   MCV 92   MCH 29 7   MCHC 32 3   RDW 13 4   Platelets 365     Scheduled Meds:    Current Facility-Administered Medications:  acetaminophen 650 mg Oral Q6H PRN Joyice Hunger, DO    acetaminophen 975 mg Oral BID PRN Joyice Hunger, DO    allopurinol 100 mg Oral 4x Daily Joyice Hunger, DO    aluminum-magnesium hydroxide-simethicone 15 mL Oral Q6H PRN Joyice Hunger, DO    aspirin 81 mg Oral Daily Joyice Hunger, DO    atorvastatin 40 mg Oral Daily With [employer], DO    cefTRIAXone 1,000 mg Intravenous Q24H Joyice Hunger, DO Last Rate: 1,000 mg (05/05/18 1221)   And        azithromycin 500 mg Intravenous Q24H Joyice Hunger, DO    doxazosin 4 mg Oral QPM Joyice Hunger, DO    enoxaparin 40 mg Subcutaneous Daily Joyice Hunger, DO    fluticasone-salmeterol 1 puff Inhalation Q12H [facility] Slick Mcnamara, DO    gabapentin 100 mg Oral HS Joyice Hunger, DO    guaiFENesin 1,200 mg Oral BID Joyice Hunger, DO    ipratropium 0 5 mg Nebulization TID Joyice Hunger, DO    levalbuterol 1 25 mg Nebulization TID Joyice Hunger, DO losartan 100 mg Oral Daily Concha Penaloza, DO    ondansetron 4 mg Intravenous Q6H PRN Concha Cobbted, DO    predniSONE 40 mg Oral Daily Wero Jimenez MD    sodium chloride 1,000 mL Intravenous Once Yovana Esquivel MD    Followed by        sodium chloride 1,000 mL Intravenous Once Yovana Esquivel MD      PRN Meds:    acetaminophen    acetaminophen    aluminum-magnesium hydroxide-simethicone    ondansetron      VTE Prophylaxis:  Subcutaneous Lovenox  CODE STATUS: FULL      Patient Centered Rounds: I have discussed today's plans with nursing staff today  DISPOSITION:  Anticipate discharge in a m  Time Spent for Care: 20 minutes   More than 50% of total time spent on counseling and coordination of care as described above  Family will be updated  Kristi Mishra MD  HOSPITALIST SERVICES  5/6/2018    PLEASE NOTE:  This encounter was completed utilizing the [employer]Fluency Direct Speech Voice Recognition Software  Grammatical errors, random word insertions, pronoun errors and incomplete sentences are occasional consequences of the system due to software limitations, ambient noise and hardware issues  These may be missed by proof reading prior to affixing electronic signature  Any questions or concerns about the content, text or information contained within the body of this dictation should be directly addressed to the physician for clarification  Please do not hesitate to call me directly if you have any any questions or concerns

## 2018-05-06 NOTE — PROGRESS NOTES
Pt resting pulse ox: 92% on 2L NC    Ambulating pulse ox: as low as 83% on 2L NC  Walked 70 ft  Recovered into the 90s within 5 minutes on 6L NC  Walked additional 70 ft on 6L NC  Sats 88-90%  Pt recovered on 6L NC within a few minutes  Placed on 4L NC to recover  Pt states breathing feels better with oxygen while ambulating

## 2018-05-06 NOTE — ASSESSMENT & PLAN NOTE
Blood pressure is s tends to be on the high side in the mornings  Most likely secondary to COPD exacerbation  Continue losartan, add hydralazine as needed

## 2018-05-06 NOTE — ASSESSMENT & PLAN NOTE
Continued broad-spectrum antibiotic coverage  (IV Rocephin + IV Zithromax-- day 3)  Follow-up blood culture x2, sputum culture -- no significant growth so far  Urine for Legionella antigen and Streptococcus pneumoniae antigen negative

## 2018-05-06 NOTE — ASSESSMENT & PLAN NOTE
Condition much improved  Patient will continue on Xopenex and Atrovent nebulization, Advair  Transition IV Solu-Medrol to p o  prednisone today  Patient will be weaned off the oxygen

## 2018-05-06 NOTE — PROGRESS NOTES
Pt resting pulse ox on room air: 90%    Ambulating pulse ox on room air: Pt walked 50 ft before getting extremely SOB  Lowest O2 sat 83% (within a minute of walking) before placing pt on 6L NC to recover  Pt states he felt better walking with the oxygen  Pt needed to recover for a few minutes before walking back to his room  Pt placed back in chair and recovered to 90% on 6L NC within 5 minutes

## 2018-05-07 VITALS
WEIGHT: 255.73 LBS | HEART RATE: 88 BPM | HEIGHT: 69 IN | OXYGEN SATURATION: 94 % | RESPIRATION RATE: 18 BRPM | BODY MASS INDEX: 37.88 KG/M2 | TEMPERATURE: 98 F | DIASTOLIC BLOOD PRESSURE: 95 MMHG | SYSTOLIC BLOOD PRESSURE: 173 MMHG

## 2018-05-07 LAB
BACTERIA SPT RESP CULT: NORMAL
BASOPHILS # BLD AUTO: 0.01 THOUSANDS/ΜL (ref 0–0.1)
BASOPHILS NFR BLD AUTO: 0 % (ref 0–1)
EOSINOPHIL # BLD AUTO: 0 THOUSAND/ΜL (ref 0–0.61)
EOSINOPHIL NFR BLD AUTO: 0 % (ref 0–6)
ERYTHROCYTE [DISTWIDTH] IN BLOOD BY AUTOMATED COUNT: 13.5 % (ref 11.6–15.1)
GRAM STN SPEC: NORMAL
HCT VFR BLD AUTO: 41.1 % (ref 36.5–49.3)
HGB BLD-MCNC: 13.2 G/DL (ref 12–17)
LYMPHOCYTES # BLD AUTO: 1.34 THOUSANDS/ΜL (ref 0.6–4.47)
LYMPHOCYTES NFR BLD AUTO: 9 % (ref 14–44)
MCH RBC QN AUTO: 29.5 PG (ref 26.8–34.3)
MCHC RBC AUTO-ENTMCNC: 32.1 G/DL (ref 31.4–37.4)
MCV RBC AUTO: 92 FL (ref 82–98)
MONOCYTES # BLD AUTO: 0.98 THOUSAND/ΜL (ref 0.17–1.22)
MONOCYTES NFR BLD AUTO: 7 % (ref 4–12)
NEUTROPHILS # BLD AUTO: 12.54 THOUSANDS/ΜL (ref 1.85–7.62)
NEUTS SEG NFR BLD AUTO: 84 % (ref 43–75)
NRBC BLD AUTO-RTO: 0 /100 WBCS
PLATELET # BLD AUTO: 215 THOUSANDS/UL (ref 149–390)
PMV BLD AUTO: 11.1 FL (ref 8.9–12.7)
RBC # BLD AUTO: 4.47 MILLION/UL (ref 3.88–5.62)
WBC # BLD AUTO: 14.94 THOUSAND/UL (ref 4.31–10.16)

## 2018-05-07 PROCEDURE — 99239 HOSP IP/OBS DSCHRG MGMT >30: CPT | Performed by: INTERNAL MEDICINE

## 2018-05-07 PROCEDURE — 94640 AIRWAY INHALATION TREATMENT: CPT

## 2018-05-07 PROCEDURE — 85025 COMPLETE CBC W/AUTO DIFF WBC: CPT | Performed by: INTERNAL MEDICINE

## 2018-05-07 PROCEDURE — 94760 N-INVAS EAR/PLS OXIMETRY 1: CPT

## 2018-05-07 RX ORDER — AZITHROMYCIN 500 MG/1
250 TABLET, FILM COATED ORAL DAILY
Qty: 2 TABLET | Refills: 0 | Status: SHIPPED | OUTPATIENT
Start: 2018-05-07 | End: 2018-05-11

## 2018-05-07 RX ORDER — PREDNISONE 10 MG/1
TABLET ORAL
Qty: 22 TABLET | Refills: 0 | Status: SHIPPED | OUTPATIENT
Start: 2018-05-07 | End: 2018-11-01 | Stop reason: ALTCHOICE

## 2018-05-07 RX ORDER — SALICYLIC ACID 40 %
81 ADHESIVE PATCH, MEDICATED TOPICAL DAILY
Qty: 30 TABLET | Refills: 0
Start: 2018-05-07 | End: 2018-05-30 | Stop reason: SDUPTHER

## 2018-05-07 RX ORDER — ATORVASTATIN CALCIUM 40 MG/1
40 TABLET, FILM COATED ORAL
Qty: 30 TABLET | Refills: 0
Start: 2018-05-07 | End: 2018-06-14 | Stop reason: SDUPTHER

## 2018-05-07 RX ORDER — HYDROCHLOROTHIAZIDE 25 MG/1
25 TABLET ORAL DAILY
Qty: 30 TABLET | Refills: 0
Start: 2018-05-07 | End: 2018-09-07 | Stop reason: SDUPTHER

## 2018-05-07 RX ORDER — LOSARTAN POTASSIUM 100 MG/1
100 TABLET ORAL DAILY
Qty: 30 TABLET | Refills: 0 | Status: SHIPPED | OUTPATIENT
Start: 2018-05-07 | End: 2018-07-30 | Stop reason: SDUPTHER

## 2018-05-07 RX ADMIN — IPRATROPIUM BROMIDE 0.5 MG: 0.5 SOLUTION RESPIRATORY (INHALATION) at 08:14

## 2018-05-07 RX ADMIN — PREDNISONE 40 MG: 20 TABLET ORAL at 08:21

## 2018-05-07 RX ADMIN — ENOXAPARIN SODIUM 40 MG: 40 INJECTION SUBCUTANEOUS at 08:21

## 2018-05-07 RX ADMIN — ALLOPURINOL 100 MG: 100 TABLET ORAL at 08:21

## 2018-05-07 RX ADMIN — LEVALBUTEROL HYDROCHLORIDE 1.25 MG: 1.25 SOLUTION, CONCENTRATE RESPIRATORY (INHALATION) at 13:41

## 2018-05-07 RX ADMIN — IPRATROPIUM BROMIDE 0.5 MG: 0.5 SOLUTION RESPIRATORY (INHALATION) at 13:41

## 2018-05-07 RX ADMIN — CEFTRIAXONE 1000 MG: 1 INJECTION, SOLUTION INTRAVENOUS at 12:00

## 2018-05-07 RX ADMIN — AZITHROMYCIN MONOHYDRATE 500 MG: 500 INJECTION, POWDER, LYOPHILIZED, FOR SOLUTION INTRAVENOUS at 12:00

## 2018-05-07 RX ADMIN — LOSARTAN POTASSIUM 100 MG: 50 TABLET ORAL at 08:21

## 2018-05-07 RX ADMIN — ALLOPURINOL 100 MG: 100 TABLET ORAL at 12:00

## 2018-05-07 RX ADMIN — GUAIFENESIN 1200 MG: 600 TABLET, EXTENDED RELEASE ORAL at 08:21

## 2018-05-07 RX ADMIN — FLUTICASONE PROPIONATE AND SALMETEROL 1 PUFF: 50; 250 POWDER RESPIRATORY (INHALATION) at 08:22

## 2018-05-07 RX ADMIN — ASPIRIN 81 MG: 81 TABLET, COATED ORAL at 08:21

## 2018-05-07 RX ADMIN — LEVALBUTEROL HYDROCHLORIDE 1.25 MG: 1.25 SOLUTION, CONCENTRATE RESPIRATORY (INHALATION) at 08:14

## 2018-05-07 NOTE — DISCHARGE INSTRUCTIONS
1   CBC WITH DIFFERENTIAL IN 1 WEEK  2   Chest Xray, PA and lateral views in 4 weeks  COPD (Chronic Obstructive Pulmonary Disease)   WHAT YOU NEED TO KNOW:   Chronic obstructive pulmonary disease (COPD) is a lung disease that makes it hard for you to breathe  It is usually a result of lung damage caused by years of irritation and inflammation in your lungs  DISCHARGE INSTRUCTIONS:   Call 911 if:   · You feel lightheaded, short of breath, and have chest pain  Return to the emergency department if:   · You are confused, dizzy, or feel faint  · Your arm or leg feels warm, tender, and painful  It may look swollen and red  · You cough up blood  Contact your healthcare provider if:   · You have more shortness of breath than usual      · You need more medicine than usual to control your symptoms  · You are coughing or wheezing more than usual      · You are coughing up more mucus, or it is a different color or has a different odor  · You gain more than 3 pounds in a week  · You have a fever, a runny or stuffy nose, and a sore throat, or other cold or flu symptoms  · Your skin, lips, or nails start to turn blue  · You have swelling in your legs or ankles  · You are very tired or weak for more than a day  · You notice changes in your mood, or changes in your ability to think or concentrate  · You have questions or concerns about your condition or care  Medicines:   · Medicines  may be used to open your airways, decrease swelling and inflammation in your lungs, or treat an infection  You may need 2 or more medicines  A short-acting medicine relieves symptoms quickly  Long-acting medicines will control or prevent symptoms  Ask for more information about the medicines you are given and how to use them safely  · Take your medicine as directed  Contact your healthcare provider if you think your medicine is not helping or if you have side effects   Tell him or her if you are allergic to any medicine  Keep a list of the medicines, vitamins, and herbs you take  Include the amounts, and when and why you take them  Bring the list or the pill bottles to follow-up visits  Carry your medicine list with you in case of an emergency  Help make breathing easier:   · Use pursed-lip breathing any time you feel short of breath  Take a deep breath in through your nose  Slowly breathe out through your mouth with your lips pursed for twice as long as you inhaled  You can also practice this breathing pattern while you bend, lift, climb stairs, or exercise  It slows down your breathing and helps move more air in and out of your lungs  · Do not smoke, and avoid others who smoke  Nicotine and other substances can cause lung irritation or damage and make it harder for you to breathe  Do not use e-cigarettes or smokeless tobacco  They still contain nicotine  Ask your healthcare provider for information if you currently smoke and need help to quit  For support and more information:  ¨ [facility]  Phone: 6- 881 - 926-5937  Web Address: [place]      · Be aware of and avoid anything that makes your symptoms worse  Stay out of high altitudes and places with high humidity  Stay inside, or cover your mouth and nose with a scarf when you are outside during cold weather  Stay inside on days when air pollution or pollen counts are high  Do not use aerosol sprays such as deodorant, bug spray, and hair spray  Manage COPD and help prevent exacerbations:  COPD is a serious condition that gets worse over time  A COPD exacerbation means your symptoms suddenly get worse  It is important to prevent exacerbations  An exacerbation can cause more lung damage  COPD cannot be cured, but you can take action to feel better and prevent COPD exacerbations:  · Protect yourself from germs  Germs can get into your lungs and cause an infection   An infection in your lungs can create more mucus and make it harder to breathe  An infection can also create swelling in your airways and prevent air from getting in  You can decrease your risk for infection by doing the following:     [facility] [place] often with soap and water  Carry germ-killing gel with you  You can use the gel to clean your hands when soap and water are not available  ¨ Do not touch your eyes, nose, or mouth unless you have washed your hands first      ¨ Always cover your mouth when you cough  Cough into a tissue or your shirtsleeve so you do not spread germs from your hands  ¨ Try to avoid people who have a cold or the flu  If you are sick, stay away from others as much as possible  · Drink more liquids  This will help to keep your air passages moist and help you cough up mucus  Ask how much liquid to drink each day and which liquids are best for you  · Exercise daily  Exercise for at least 20 minutes each day to help increase your energy and decrease shortness of breath  Walking or riding a bike are good ways to exercise  Talk to your healthcare provider about the best exercise plan for you  · Ask about vaccines  Your healthcare provider may recommend that you get regular flu and pneumonia vaccines  Pneumonia can become life-threatening for a person who has COPD  Ask about other vaccines you may need  Ask your healthcare provider about the flu and pneumonia vaccines  All adults should get the flu (influenza) vaccine every year as soon as it becomes available  The pneumonia vaccine is given to adults aged 72 or older to prevent pneumococcal disease, such as pneumonia  Adults aged 23 to 59 years who are at high risk for pneumococcal disease also should get the pneumococcal vaccine  It may need to be repeated 1 or 5 years later  Pulmonary rehabilitation:  Your healthcare provider may recommend a program to help you manage your symptoms and improve your quality of life   It may include nutritional counseling and exercise to strengthen your lungs  Make decisions about your choices for future treatment:  Ask for information about advanced medical directives and living patten  These documents help you decide and write down your choices for treatment and end-of-life care  It is best to complete them when you feel well and can think clearly about your wishes  The information can then be kept for future use if you are in the hospital or become very ill  Follow up with your healthcare provider as directed: You may need more tests  Your healthcare provider may refer you to a pulmonary (lung) specialist  Write down your questions so you remember to ask them during your visits  © 2017 [employer] Information is for End User's use only and may not be sold, redistributed or otherwise used for commercial purposes  All illustrations and images included in CareNotes® are the copyrighted property of [employer]  or [employer]  The above information is an  only  It is not intended as medical advice for individual conditions or treatments  Talk to your doctor, nurse or pharmacist before following any medical regimen to see if it is safe and effective for you  COPD (Chronic Obstructive Pulmonary Disease)   WHAT YOU NEED TO KNOW:   Chronic obstructive pulmonary disease (COPD) is a lung disease that makes it hard for you to breathe  It is usually a result of lung damage caused by years of irritation and inflammation in your lungs  DISCHARGE INSTRUCTIONS:   Call 911 if:   · You feel lightheaded, short of breath, and have chest pain  Seek care immediately if:   · You are confused, dizzy, or feel faint  · Your arm or leg feels warm, tender, and painful  It may look swollen and red  · You cough up blood  Contact your healthcare provider if:   · You have more shortness of breath than usual      · You need more medicine than usual to control your symptoms       · You are coughing or wheezing more than usual      · You are coughing up more mucus, or it is a different color or has a different odor  · You gain more than 3 pounds in a week  · You have a fever, a runny or stuffy nose, and a sore throat, or other cold or flu symptoms  · Your skin, lips, or nails start to turn blue  · You have swelling in your legs or ankles  · You are very tired or weak for more than a day  · You notice changes in your mood, or changes in your ability to think or concentrate  · You have questions or concerns about your condition or care  Medicines:   · Medicines  may be used to open your airways, decrease swelling and inflammation in your lungs, or treat an infection  You may need 2 or more medicines  A short-acting medicine relieves symptoms quickly  Long-acting medicines will control or prevent symptoms  Ask for more information about the medicines you are given and how to use them safely  · Take your medicine as directed  Contact your healthcare provider if you think your medicine is not helping or if you have side effects  Tell him or her if you are allergic to any medicine  Keep a list of the medicines, vitamins, and herbs you take  Include the amounts, and when and why you take them  Bring the list or the pill bottles to follow-up visits  Carry your medicine list with you in case of an emergency  Help make breathing easier:   · Use pursed-lip breathing any time you feel short of breath  Take a deep breath in through your nose  Slowly breathe out through your mouth with your lips pursed for twice as long as you inhaled  You can also practice this breathing pattern while you bend, lift, climb stairs, or exercise  It slows down your breathing and helps move more air in and out of your lungs  · Do not smoke, and avoid others who smoke  Nicotine and other substances can cause lung irritation or damage and make it harder for you to breathe   Do not use e-cigarettes or smokeless tobacco  They still contain nicotine  Ask your healthcare provider for information if you currently smoke and need help to quit  For support and more information:  [facility]  [employer]  Phone: 4- 117 - 686-8757  Web Address: [employer]      · Be aware of and avoid anything that makes your symptoms worse  Stay out of high altitudes and places with high humidity  Stay inside, or cover your mouth and nose with a scarf when you are outside during cold weather  Stay inside on days when air pollution or pollen counts are high  Do not use aerosol sprays such as deodorant, bug spray, and hair spray  Manage COPD and help prevent exacerbations:  COPD is a serious condition that gets worse over time  A COPD exacerbation means your symptoms suddenly get worse  It is important to prevent exacerbations  An exacerbation can cause more lung damage  COPD cannot be cured, but you can take action to feel better and prevent COPD exacerbations:  · Protect yourself from germs  Germs can get into your lungs and cause an infection  An infection in your lungs can create more mucus and make it harder to breathe  An infection can also create swelling in your airways and prevent air from getting in  You can decrease your risk for infection by doing the following:     [facility] [place] [facility] your hands often with soap and water  Carry germ-killing gel with you  You can use the gel to clean your hands when soap and water are not available  ¨ Do not touch your eyes, nose, or mouth unless you have washed your hands first      ¨ Always cover your mouth when you cough  Cough into a tissue or your shirtsleeve so you do not spread germs from your hands  ¨ Try to avoid people who have a cold or the flu  If you are sick, stay away from others as much as possible  · Drink more liquids  This will help to keep your air passages moist and help you cough up mucus   Ask how much liquid to drink each day and which liquids are best for you     · Exercise daily  Exercise for at least 20 minutes each day to help increase your energy and decrease shortness of breath  Walking or riding a bike are good ways to exercise  Talk to your healthcare provider about the best exercise plan for you  · Ask about vaccines  Your healthcare provider may recommend that you get regular flu and pneumonia vaccines  Pneumonia can become life-threatening for a person who has COPD  Ask about other vaccines you may need  Ask your healthcare provider about the flu and pneumonia vaccines  All adults should get the flu (influenza) vaccine every year as soon as it becomes available  The pneumonia vaccine is given to adults aged 72 or older to prevent pneumococcal disease, such as pneumonia  Adults aged 23 to 59 years who are at high risk for pneumococcal disease also should get the pneumococcal vaccine  It may need to be repeated 1 or 5 years later  Pulmonary rehabilitation:  Your healthcare provider may recommend a program to help you manage your symptoms and improve your quality of life  It may include nutritional counseling and exercise to strengthen your lungs  Make decisions about your choices for future treatment:  Ask for information about advanced medical directives and living patten  These documents help you decide and write down your choices for treatment and end-of-life care  It is best to complete them when you feel well and can think clearly about your wishes  The information can then be kept for future use if you are in the hospital or become very ill  Follow up with your healthcare provider as directed: You may need more tests  Your healthcare provider may refer you to a pulmonary (lung) specialist  Write down your questions so you remember to ask them during your visits  © 2017 [employer] Information is for End User's use only and may not be sold, redistributed or otherwise used for commercial purposes   All illustrations and images included in [place] are the copyrighted property of [employer]  or Sam James  The above information is an  only  It is not intended as medical advice for individual conditions or treatments  Talk to your doctor, nurse or pharmacist before following any medical regimen to see if it is safe and effective for you  COPD (Chronic Obstructive Pulmonary Disease)   AMBULATORY CARE:   COPD (chronic obstructive pulmonary disease)  is a lung disease that makes it hard for you to breathe  COPD is usually a result of lung damage caused by years of irritation and inflammation  COPD limits air flow in your lungs  Smoking, pollution, genetics, or a history of lung infections can increase your risk for COPD  Common symptoms include the following:   · Shortness of breath     · A dry cough     · Coughing fits that bring up mucus from your lungs     · Wheezing and chest tightness  Call 911 if:   · You feel lightheaded, short of breath, and have chest pain  Seek care immediately if:   · You are confused, dizzy, or feel faint  · Your arm or leg feels warm, tender, and painful  It may look swollen and red  · You cough up blood  Contact your healthcare provider if:   · You have more shortness of breath than usual      · You need more medicine than usual to control your symptoms  · You are coughing or wheezing more than usual      · You are coughing up more mucus, or it is a different color or has a different odor  · You gain more than 3 pounds in a week  · You have a fever, a runny or stuffy nose, and a sore throat, or other cold or flu symptoms  · Your skin, lips, or nails start to turn blue  · You have swelling in your legs or ankles  · You are very tired or weak for more than a day  · You notice changes in your mood, or changes in your ability to think or concentrate  · You have questions or concerns about your condition or care    Treatment for COPD may include medicines to help decrease swelling and inflammation in your lungs  Medicines may also help open your airways or treat and infection  You may need pulmonary rehabilitation to help you manage your symptoms and improve your quality of life  You may need extra oxygen to help you breathe easier  Help make breathing easier:   · Use pursed-lip breathing any time you feel short of breath  Take a deep breath in through your nose  Slowly breathe out through your mouth with your lips pursed for twice as long as you inhaled  You can also practice this breathing pattern while you bend, lift, climb stairs, or exercise  It slows down your breathing and helps move more air in and out of your lungs  · Do not smoke, and avoid others who smoke  Nicotine and other substances can cause lung irritation or damage and make it harder for you to breathe  Do not use e-cigarettes or smokeless tobacco  They still contain nicotine  Ask your healthcare provider for information if you currently smoke and need help to quit  For support and more information:  ¨ [employer]  Phone: 3- 594 - 341-0352  Web Address: [employer]      · Be aware of and avoid anything that makes your symptoms worse  Stay out of high altitudes and places with high humidity  Stay inside, or cover your mouth and nose with a scarf when you are outside during cold weather  Stay inside on days when air pollution or pollen counts are high  Do not use aerosol sprays such as deodorant, bug spray, and hair spray  Manage COPD and help prevent exacerbations:  COPD is a serious condition that gets worse over time  A COPD exacerbation means your symptoms suddenly get worse  It is important to prevent exacerbations  An exacerbation can cause more lung damage  COPD cannot be cured, but you can take action to feel better and prevent COPD exacerbations:  · Protect yourself from germs  Germs can get into your lungs and cause an infection   An infection in your lungs can create more mucus and make it harder to breathe  An infection can also create swelling in your airways and prevent air from getting in  You can decrease your risk for infection by doing the following:     [facility] [place] with soap and water  Carry germ-killing gel with you  You can use the gel to clean your hands when soap and water are not available  ¨ Do not touch your eyes, nose, or mouth unless you have washed your hands first      ¨ Always cover your mouth when you cough  Cough into a tissue or your shirtsleeve so you do not spread germs from your hands  ¨ Try to avoid people who have a cold or the flu  If you are sick, stay away from others as much as possible  · Drink more liquids  This will help to keep your air passages moist and help you cough up mucus  Ask how much liquid to drink each day and which liquids are best for you  · Exercise daily  Exercise for at least 20 minutes each day to help increase your energy and decrease shortness of breath  Talk to your healthcare provider about the best exercise plan for you  · Ask about vaccines  Your healthcare provider may recommend that you get regular flu and pneumonia vaccines  Pneumonia can become life-threatening for a person who has COPD  Ask about other vaccines you may need  Ask your healthcare provider about the flu and pneumonia vaccines  All adults should get the flu (influenza) vaccine every year as soon as it becomes available  The pneumonia vaccine is given to adults aged 72 or older to prevent pneumococcal disease, such as pneumonia  Adults aged 23 to 59 years who are at high risk for pneumococcal disease also should get the pneumococcal vaccine  It may need to be repeated 1 or 5 years later  Pulmonary rehabilitation:  Your healthcare provider may recommend a program to help you manage your symptoms and improve your quality of life   It may include nutritional counseling and exercise, such as walking, to strengthen your lungs  Make decisions about your choices for future treatment:  Ask for information about advanced medical directives and living patten  These documents help you decide and write down your choices for treatment and end-of-life care  It is best to complete them when you feel well and can think clearly about your wishes  The information can then be kept for future use if you are in the hospital or become very ill  Follow up with your healthcare provider as directed: You may need more tests  Your healthcare provider may refer you to a pulmonary (lung) specialist  Write down your questions so you remember to ask them during your visits  © 2017 [employer]  Information is for End User's use only and may not be sold, redistributed or otherwise used for commercial purposes  All illustrations and images included in CareNotes® are the copyrighted property of [employer]  or Sam James  The above information is an  only  It is not intended as medical advice for individual conditions or treatments  Talk to your doctor, nurse or pharmacist before following any medical regimen to see if it is safe and effective for you

## 2018-05-07 NOTE — ASSESSMENT & PLAN NOTE
Continued broad-spectrum antibiotic coverage  (IV Rocephin + IV Zithromax-- day 4/7)  Follow-up blood culture x2, sputum culture -- no significant growth so far  Urine for Legionella antigen and Streptococcus pneumoniae antigen negative

## 2018-05-07 NOTE — PROGRESS NOTES
Progress Note Nivia Heredia 1960, 59 y o  male MRN: [id number]    Unit/Bed#: [facility] 401-01 Encounter: [id number]    Primary Care Provider: Bere Hamm DO   Date and time admitted to hospital: 5/4/2018 10:43 AM        * Acute respiratory failure with hypoxia and hypercapnia (HCC)   Assessment & Plan    Condition much improved  Patient will continue on Xopenex and Atrovent nebulization, Advair PO prednisone taper  Pulse ox on ambulation done on 05/06/18 and has qualified for home oxygen  Pt resting pulse ox: 92% on 2L NC  Ambulating pulse ox: as low as 83% on 2L NC  Walked 70 ft  Recovered into the 90s within 5 minutes on 6L NC  Walked additional 70 ft on 6L NC  Sats 88-90%  Pt recovered on 6L NC within a few minutes  Placed on 4L NC to recover  Pt states breathing feels better with oxygen while ambulating  Pt resting pulse ox on room air: 90%  Ambulating pulse ox on room air: Pt walked 50 ft before getting extremely SOB  Lowest O2 sat 83% (within a minute of walking) before placing pt on 6L NC to recover  Pt states he felt better walking with the oxygen  Pt needed to recover for a few minutes before walking back to his room  Pt placed back in chair and recovered to 90% on 6L NC within 5 minutes  COPD with acute exacerbation [facility])   Assessment & Plan    Condition much improved  Refer to above  Pulse ox on ambulation done on 05/06/18 and has qualified for home oxygen  Pt resting pulse ox: 92% on 2L NC  Ambulating pulse ox: as low as 83% on 2L NC  Walked 70 ft  Recovered into the 90s within 5 minutes on 6L NC  Walked additional 70 ft on 6L NC  Sats 88-90%  Pt recovered on 6L NC within a few minutes  Placed on 4L NC to recover  Pt states breathing feels better with oxygen while ambulating  Pt resting pulse ox on room air: 90%  Ambulating pulse ox on room air: Pt walked 50 ft before getting extremely SOB  Lowest O2 sat 83% (within a minute of walking) before placing pt on 6L NC to recover   Pt states he felt better walking with the oxygen  Pt needed to recover for a few minutes before walking back to his room  Pt placed back in chair and recovered to 90% on 6L NC within 5 minutes  Community acquired pneumonia   Assessment & Plan    Continued broad-spectrum antibiotic coverage  (IV Rocephin + IV Zithromax-- day 4)  Follow-up blood culture x2, sputum culture -- no significant growth so far  Urine for Legionella antigen and Streptococcus pneumoniae antigen negative  Leukocytosis   Assessment & Plan    This is secondary to IV steroids  WBC trending down                 ______________________________________________________________________    SUBJECTIVE:   Patient seen and examined  SOB improving  He has qualified for home oxygen  Pulse ox on ambulation done on 18 and has qualified for home oxygen  Pt resting pulse ox: 92% on 2L NC  Ambulating pulse ox: as low as 83% on 2L NC  Walked 70 ft  Recovered into the 90s within 5 minutes on 6L NC  Walked additional 70 ft on 6L NC  Sats 88-90%  Pt recovered on 6L NC within a few minutes  Placed on 4L NC to recover  Pt states breathing feels better with oxygen while ambulating  Pt resting pulse ox on room air: 90%  Ambulating pulse ox on room air: Pt walked 50 ft before getting extremely SOB  Lowest O2 sat 83% (within a minute of walking) before placing pt on 6L NC to recover  Pt states he felt better walking with the oxygen  Pt needed to recover for a few minutes before walking back to his room  Pt placed back in chair and recovered to 90% on 6L NC within 5 minutes       OBJECTIVE:     Vitals:   HR:  [80-88] 88  Resp:  [16-18] 18  BP: (158-173)/(75-95) 173/95  SpO2:  [92 %-94 %] 93 %  Temp (24hrs), Av 6 °F (36 4 °C), Min:97 4 °F (36 3 °C), Max:98 °F (36 7 °C)  Current: Temperature: 98 °F (36 7 °C)    Intake/Output Summary (Last 24 hours) at 18 1008  Last data filed at 18 0752   Gross per 24 hour   Intake             1935 ml Output              200 ml   Net             1735 ml       Physical Exam:   GENERAL: AAO x 3, over weight  HEENT: atraumatic, normocephalic  Oral mucosa moist, no icterus, pallor  PERRLA +  Neck supple, no JVD, no lymphadenopathy, no thryomegaly  CHEST: B/L breath sounds heard, occasional wheezing  CVS: S1, S2   ABDOMEN: Soft/obese/NT/BS heard  NEUROLOGICAL: CN II -XI grossly intact  No focal motor or sensory deficits  No signs of meningeal irritation or cerebellar dysfunction  EXTREMITIES: No cyanosis/clubbing or edema      LABS:  Results for Kye Mathews (MRN [id number]) as of 5/7/2018 10:04   5/7/2018 05:42   WBC 14 94 (H)   RBC 4 47   Hemoglobin 13 2   Hematocrit 41 1   MCV 92   MCH 29 5   MCHC 32 1   RDW 13 5   Platelets 719   MPV 65 3   nRBC 0   Neutrophils Relative 84 (H)   Lymphocytes Relative 9 (L)   Monocytes Relative 7   Eosinophils Relative 0   Basophils Relative 0   Lymphocytes Absolute 1 34   Monocytes Absolute 0 98   Eosinophils Absolute 0 00   Basophils Absolute 0 01     Scheduled Meds:    Current Facility-Administered Medications:  acetaminophen 650 mg Oral Q6H PRN Lambert Evens, DO    acetaminophen 975 mg Oral BID PRN Lambert Evens, DO    albuterol 2 puff Inhalation Q4H PRN Beatriz Wiseman MD    allopurinol 100 mg Oral 4x Daily Lambert Evens, DO    aluminum-magnesium hydroxide-simethicone 15 mL Oral Q6H PRN Lambert Evens, DO    aspirin 81 mg Oral Daily Lambert Evens, DO    atorvastatin 40 mg Oral Daily With [employer], DO    cefTRIAXone 1,000 mg Intravenous Q24H Lambert Evens, DO Last Rate: Stopped (05/06/18 1637)   And        azithromycin 500 mg Intravenous Q24H Lambert Evens, DO Last Rate: Stopped (05/06/18 1637)   doxazosin 4 mg Oral QPM Lambert Evens, DO    enoxaparin 40 mg Subcutaneous Daily Lambert Evens, DO    fluticasone-salmeterol 1 puff Inhalation Q12H [facility] Slick Bauman, DO    gabapentin 100 mg Oral HS Slick Xiong, DO    guaiFENesin 1,200 mg Oral BID Eddie [place], DO    ipratropium 0 5 mg Nebulization TID [facility], DO    levalbuterol 1 25 mg Nebulization TID [facility], DO    losartan 100 mg Oral Daily Eddie Ridge, DO    ondansetron 4 mg Intravenous Q6H PRN Eddie Ridge, DO    predniSONE 40 mg Oral Daily Wero Goodwin MD    sodium chloride 1,000 mL Intravenous Once Haim Hough MD    Followed by        sodium chloride 1,000 mL Intravenous Once Haim Hough MD        PRN Meds:    acetaminophen    acetaminophen    albuterol    aluminum-magnesium hydroxide-simethicone    ondansetron      VTE Prophylaxis:  Enoxaparin    CODE STATUS: FULL    Patient Centered Rounds: I have discussed today's plans with nursing staff today  DISPOSITION: Anticipate discharge later today, after home oxygen setup  Time Spent for Care: 20 minutes   More than 50% of total time spent on counseling and coordination of care as described above  Family will be updated  Baljinder Mcmahon MD  HOSPITALIST SERVICES  5/7/2018    PLEASE NOTE:  This encounter was completed utilizing the [employer] Direct Speech Voice Recognition Software  Grammatical errors, random word insertions, pronoun errors and incomplete sentences are occasional consequences of the system due to software limitations, ambient noise and hardware issues  These may be missed by proof reading prior to affixing electronic signature  Any questions or concerns about the content, text or information contained within the body of this dictation should be directly addressed to the physician for clarification  Please do not hesitate to call me directly if you have any any questions or concerns

## 2018-05-07 NOTE — DISCHARGE SUMMARY
1 \[facility]\""    NAME: Mehrdad Razo  AGE: 59 y o  SEX: male   MRN: [id number]   Encounter: [id number]   Unit/Bed: [place] [address]     Admitting Provider:  Giovany Britt DO  Discharge Provider:  Nessa Pham MD  Admission Date: 5/4/2018       Discharge Date: 05/07/18   LOS: 3  Primary Care Physician at Discharge: Jeff Wheat [phone]    DISCHARGE DIAGNOSES  · Acute respiratory failure with hypoxia and hypercapnia secondary to acute COPD exacerbation  · Acute COPD exacerbation  · Community-acquired pneumonia  · Leukocytosis  · Hyperlipidemia  · Obstructive sleep apnea  · Gout  · ESSENTIAL HYPERTENSION  · CAD    HOSPITAL COURSE:  Mehrdad Razo is a 59 y o  male who presents with increased shortness of breath and wheezing  The patient states that he started having increased wheezing about 3 days ago but did not notice much of an increase in his coughing which is somewhat chronic  However, he did feel some chest congestion  The patient at times felt cold but did not take his temperature at home to determine if he had any fevers or not  The patient has had some increased dizziness and lightheadedness over the last 3 days  The patient states that this morning he felt funny and felt that his chest was tight and he was a bit more short of breath to the point that he felt that he needed a nebulizer treatment in the emergency department, stating that I just know when I need a nebulizer treatment  Therefore, at 6:30 a m  the patient recalls leaving his home to try to come to the emergency department  However, the patient did not come into the emergency department until 10:00 a m  he states  The patient thinks that there is a possibility that he slept in his car in the parking lot of the hospital but does not even recall driving here    When the patient is brought into the emergency department it is noted that his oxygen levels are hypoxic initially with oxygen saturations in the low 80% range  The patient was given nebulizer treatments and initially was placed on BiPAP in the emergency department  After transient use of BiPAP as well as the nebulizers the patient notes some improvement in his breathing, but still feels that his respiratory symptoms are present and he is not at baseline  The patient is noted to be wheezing and there is concern for COPD exacerbation  The patient is also noted to be febrile with a temperature of 103° on arrival to the emergency department  Request is made for admission into the hospital for treatment of suspected pneumonia and COPD exacerbation  Below is the short summary of hospital stay:  Acute respiratory failure with hypoxia and hypercapnia (HCC)   Assessment & Plan     Condition much improved  Patient will continue on Xopenex and Atrovent nebulization, Advair PO prednisone taper  Pulse ox on ambulation done on 05/06/18 and has qualified for home oxygen  Pt resting pulse ox: 92% on 2L NC  Ambulating pulse ox: as low as 83% on 2L NC  Walked 70 ft  Recovered into the 90s within 5 minutes on 6L NC  Walked additional 70 ft on 6L NC  Sats 88-90%  Pt recovered on 6L NC within a few minutes  Placed on 4L NC to recover  Pt states breathing feels better with oxygen while ambulating  Pt resting pulse ox on room air: 90%  Ambulating pulse ox on room air: Pt walked 50 ft before getting extremely SOB  Lowest O2 sat 83% (within a minute of walking) before placing pt on 6L NC to recover  Pt states he felt better walking with the oxygen  Pt needed to recover for a few minutes before walking back to his room   Pt placed back in chair and recovered to 90% on 6L NC within 5 minutes     Home oxygen as well as nebulizer machine has been arranged prior to discharge        COPD with acute exacerbation ([facility] [address] )   Assessment & Plan     Condition much improved  Refer to above  Pulse ox on ambulation done on 05/06/18 and has qualified for home oxygen  Pt resting pulse ox: 92% on 2L NC  Ambulating pulse ox: as low as 83% on 2L NC  Walked 70 ft  Recovered into the 90s within 5 minutes on 6L NC  Walked additional 70 ft on 6L NC  Sats 88-90%  Pt recovered on 6L NC within a few minutes  Placed on 4L NC to recover  Pt states breathing feels better with oxygen while ambulating  Pt resting pulse ox on room air: 90%  Ambulating pulse ox on room air: Pt walked 50 ft before getting extremely SOB  Lowest O2 sat 83% (within a minute of walking) before placing pt on 6L NC to recover  Pt states he felt better walking with the oxygen  Pt needed to recover for a few minutes before walking back to his room  Pt placed back in chair and recovered to 90% on 6L NC within 5 minutes     Home oxygen as well as nebulizer machine has been arranged prior to discharge        Community acquired pneumonia   Assessment & Plan     Continued broad-spectrum antibiotic coverage  (IV Rocephin + IV Zithromax-- day 4/7)  Follow-up blood culture x2, sputum culture -- no significant growth so far  Urine for Legionella antigen and Streptococcus pneumoniae antigen negative  Hyperlipidemia   Assessment & Plan     Continue to atorvastatin        DAVID (obstructive sleep apnea)   Assessment & Plan     Continue CPAP at night        Gout   Assessment & Plan     Stable  Continue Zyloprim        Essential hypertension   Assessment & Plan    Blood pressure appears to be elevated in the morning gradually improved as the day goes by  Continue with home regimen        CAD (coronary artery disease)   Assessment & Plan     No active issues        Leukocytosis   Assessment & Plan     This is secondary to IV steroids  WBC trending down    Repeat CBC with differential in 1 week         CONSULTING PROVIDERS   None    PROCEDURES PERFORMED  Xr Chest Portable    Result Date: 5/4/2018  Narrative: CHEST INDICATION: chest pain  Shortness of breath  COMPARISON:  August 3, 2016  EXAM PERFORMED/VIEWS:  XR CHEST PORTABLE FINDINGS: Heart enlarged  Prominence of the pulmonary vessels  Hazy pulmonary opacification in the left perihilar and right infrahilar regions, most consistent with asymmetric pulmonary edema or, less likely, bilateral pneumonia  Lungs and pleural spaces otherwise clear  No pneumothorax  Osseous structures appear within normal limits for patient age  Impression: Pulmonary vascular congestion  Patchy bilateral pulmonary opacities, most consistent with pulmonary edema or, less likely, pneumonia  Workstation performed: [id number]       PERTINENT LAB DATAS  Results for Elzbieta Richard (MRN [id number]) as of 5/7/2018 13:10   5/7/2018 05:42   WBC 14 94 (H)   RBC 4 47   Hemoglobin 13 2   Hematocrit 41 1   MCV 92   MCH 29 5   MCHC 32 1   RDW 13 5   Platelets 140   MPV 24 5   nRBC 0   Neutrophils Relative 84 (H)   Lymphocytes Relative 9 (L)   Monocytes Relative 7   Eosinophils Relative 0   Basophils Relative 0   Lymphocytes Absolute 1 34   Monocytes Absolute 0 98   Eosinophils Absolute 0 00   Basophils Absolute 0 01     PHYSICAL EXAM:  Vitals:   Blood Pressure: (!) 173/95 (BP rechecked- 143/87 ) (05/06/18 2300)  Pulse: 88 (05/07/18 0717)  Temperature: 98 °F (36 7 °C) (05/07/18 0717)  Temp Source: Oral (05/07/18 0717)  Respirations: 18 (05/07/18 0717)  Height: 5' 9" (175 3 cm) (Stated ) (05/04/18 1533)  Weight - Scale: 116 kg (255 lb 11 7 oz) (05/06/18 0907)  SpO2: 93 % (05/07/18 0816)    GENERAL: AAO x 3  HEENT: atraumatic, normocephalic  Oral mucosa moist, no icterus, pallor  PERRLA +  Neck supple, no JVD, no lymphadenopathy, no thryomegaly  CHEST: B/L breath sounds heard, occasional wheezing  CVS: S1, S2  No cyanosis/clubbing or edema  ABDOMEN: Soft/obese/NT/BS heard  NEUROLOGICAL: CN II -XI grossly intact  No focal motor or sensory deficits   No signs of meningeal irritation or cerebellar dysfunction  EXTREMITIES: No cyanosis/clubbing or edema  Discharge Disposition: Home/Self Care    Test Results Pending at Discharge:    Order Current Status    Blood culture #1 Preliminary result    Blood culture #2 Preliminary result          Medications   Please see After Visit Summary for reconciled discharge medications provided to patient and family  Diet restrictions:         Diet Orders            Start     Ordered    05/04/18 1604  Diet Regular; Regular House  Diet effective now     Question Answer Comment   Diet Type Regular    Regular Regular House    RD to adjust diet per protocol? Yes        05/04/18 1618        Activity restrictions: No strenuous activity  Discharge Condition: good    Outpatient Follow-Up  1  [place] [address]  / [address] [phone] - follow-up within one week  2  CBC with differential in 1 week  3  Chest x-ray PA and lateral views in 4 weeks  Code Status: Level 1 - Full Code    Discharge Statement   I spent 33 minutes discharging the patient  This time was spent on the day of discharge  Greater than 50% of total time was spent with the patient and / or family counseling and / or coordination of care  Alyx Saba MD  HOSPITALIST SERVICES  5/7/2018    PLEASE NOTE:  This encounter was completed utilizing the [employer] Direct Speech Voice Recognition Software  Grammatical errors, random word insertions, pronoun errors and incomplete sentences are occasional consequences of the system due to software limitations, ambient noise and hardware issues  These may be missed by proof reading prior to affixing electronic signature  Any questions or concerns about the content, text or information contained within the body of this dictation should be directly addressed to the physician for clarification  Please do not hesitate to call me directly if you have any any questions or concerns

## 2018-05-07 NOTE — PROGRESS NOTES
Discharge instructions reviewed with patient, verbalizes understanding  Patient will be escorted to lobby and assisted in to vehicle

## 2018-05-07 NOTE — ASSESSMENT & PLAN NOTE
Condition much improved  Refer to above  Pulse ox on ambulation done on 05/06/18 and has qualified for home oxygen  Pt resting pulse ox: 92% on 2L NC  Ambulating pulse ox: as low as 83% on 2L NC  Walked 70 ft  Recovered into the 90s within 5 minutes on 6L NC  Walked additional 70 ft on 6L NC  Sats 88-90%  Pt recovered on 6L NC within a few minutes  Placed on 4L NC to recover  Pt states breathing feels better with oxygen while ambulating  Pt resting pulse ox on room air: 90%  Ambulating pulse ox on room air: Pt walked 50 ft before getting extremely SOB  Lowest O2 sat 83% (within a minute of walking) before placing pt on 6L NC to recover  Pt states he felt better walking with the oxygen  Pt needed to recover for a few minutes before walking back to his room  Pt placed back in chair and recovered to 90% on 6L NC within 5 minutes

## 2018-05-07 NOTE — SOCIAL WORK
63yo male with COPD admitted for pneumonia  Alert and oriented, independent ADLS, unemployed, drives  He resides in [facility] with his girlfriend Ira Huddl[place], phone [phone]  They live in second floor apt with about 20 [address]  Pt uses no DME, no hx HHC, no hx rehab  Denies hx mental illness or D&A  No POA  PCP is Dr Raúl Ortiz  Pt  Uses [facility] in [place] but chooses to have today's meds filled at [address] [facility] beds  Pt  Will require home O2 and portable O2  Chose [facility], portable delivered to room  Also received nebulizer  Pt plans to drive himself home today and physician is aware of this  CM reviewed d/c planning process including the following: identifying help at home, patient preference for d/c planning needs, Discharge Lounge, [facility] Meds to Bed program, availability of treatment team to discuss questions or concerns patient and/or family may have regarding understanding medications and recognizing signs and symptoms once discharged  CM also encouraged patient to follow up with all recommended appointments after discharge  Patient advised of importance for patient and family to participate in managing patients medical well being  Patient/caregiver received discharge checklist  Content reviewed  Patient/caregiver encouraged to participate in discharge plan of care prior to discharge home

## 2018-05-07 NOTE — ASSESSMENT & PLAN NOTE
Condition much improved  Patient will continue on Xopenex and Atrovent nebulization, Advair PO prednisone taper  Pulse ox on ambulation done on 05/06/18 and has qualified for home oxygen  Pt resting pulse ox: 92% on 2L NC  Ambulating pulse ox: as low as 83% on 2L NC  Walked 70 ft  Recovered into the 90s within 5 minutes on 6L NC  Walked additional 70 ft on 6L NC  Sats 88-90%  Pt recovered on 6L NC within a few minutes  Placed on 4L NC to recover  Pt states breathing feels better with oxygen while ambulating  Pt resting pulse ox on room air: 90%  Ambulating pulse ox on room air: Pt walked 50 ft before getting extremely SOB  Lowest O2 sat 83% (within a minute of walking) before placing pt on 6L NC to recover  Pt states he felt better walking with the oxygen  Pt needed to recover for a few minutes before walking back to his room  Pt placed back in chair and recovered to 90% on 6L NC within 5 minutes

## 2018-05-08 ENCOUNTER — TELEPHONE (OUTPATIENT)
Dept: CASE MANAGEMENT | Facility: HOSPITAL | Age: 58
End: 2018-05-08

## 2018-05-08 NOTE — TELEPHONE ENCOUNTER
S/W pt briefly- did not seem interested in prolonged conversation  He reports he is feeling improved, was sitting on the porch without his O2 on at rest  I did reeducate him on the importance of O2 compliance at this time due to recent qualification study showing the need for continuous oxygen therapy  Reports he had obtained his medication, had not yet used his (new) nebulizer medications  Again, we spoke of the importance of continuing these recommended therapies in order to prevent relapse and rehospitalization  I reminded him of his upcoming PCP appt scheduled prior to DC for 5/30 and instructed him to call our office with any questions or concerns regarding condition or care plan, since he does not have home health services  Pt has recently switched to all [facility] providers for outpatient care, as they are closer in proximity to his home

## 2018-05-08 NOTE — CASE MANAGEMENT
Notification of Discharge  This is a Notification of Discharge from our facility [address]  Please be advised that this patient has been discharge from our facility  Below you will find the admission and discharge date and time including the patients disposition  PRESENTATION DATE: 5/4/2018 10:43 AM  IP ADMISSION DATE: 5/4/18 1255  DISCHARGE DATE: 5/7/2018  2:07 PM  DISPOSITION: [id number] [facility] in the [employer] [facility] for 2017  Network Utilization Review Department  Phone: [phone]; Fax [phone]  ATTENTION: The Network Utilization Review Department is now centralized for our 7 Facilities  Make a note that we have a new phone and fax numbers for our Department  Please call with any questions or concerns to [phone] and carefully follow the prompts so that you are directed to the right person  All voicemails are confidential  Fax any determinations, approvals, denials, and requests for initial or continue stay review clinical to [phone]  Due to HIGH CALL volume, it would be easier if you could please send faxed requests to expedite your requests and in part, help us provide discharge notifications faster

## 2018-05-09 LAB
BACTERIA BLD CULT: NORMAL
BACTERIA BLD CULT: NORMAL

## 2018-05-29 RX ORDER — ASPIRIN 81 MG/1
TABLET ORAL
COMMUNITY
Start: 2018-03-22 | End: 2018-11-01 | Stop reason: SDUPTHER

## 2018-05-29 RX ORDER — ACETAMINOPHEN 325 MG/1
975 TABLET ORAL EVERY 4 HOURS
COMMUNITY
Start: 2018-03-30 | End: 2018-05-30 | Stop reason: SDUPTHER

## 2018-05-29 RX ORDER — AMLODIPINE BESYLATE 10 MG/1
10 TABLET ORAL DAILY
Refills: 3 | COMMUNITY
Start: 2018-05-21 | End: 2018-07-30 | Stop reason: SDUPTHER

## 2018-05-30 ENCOUNTER — OFFICE VISIT (OUTPATIENT)
Dept: INTERNAL MEDICINE CLINIC | Facility: CLINIC | Age: 58
End: 2018-05-30
Payer: COMMERCIAL

## 2018-05-30 VITALS
SYSTOLIC BLOOD PRESSURE: 140 MMHG | HEART RATE: 80 BPM | WEIGHT: 253.97 LBS | BODY MASS INDEX: 37.62 KG/M2 | TEMPERATURE: 98 F | DIASTOLIC BLOOD PRESSURE: 84 MMHG | HEIGHT: 69 IN

## 2018-05-30 DIAGNOSIS — J96.11 CHRONIC RESPIRATORY FAILURE WITH HYPOXIA (HCC): ICD-10-CM

## 2018-05-30 DIAGNOSIS — E78.5 HYPERLIPIDEMIA, UNSPECIFIED HYPERLIPIDEMIA TYPE: ICD-10-CM

## 2018-05-30 DIAGNOSIS — I25.119 CORONARY ARTERY DISEASE INVOLVING NATIVE HEART WITH ANGINA PECTORIS, UNSPECIFIED VESSEL OR LESION TYPE (HCC): ICD-10-CM

## 2018-05-30 DIAGNOSIS — M1A.9XX0 CHRONIC GOUT WITHOUT TOPHUS, UNSPECIFIED CAUSE, UNSPECIFIED SITE: ICD-10-CM

## 2018-05-30 DIAGNOSIS — G47.33 OSA (OBSTRUCTIVE SLEEP APNEA): Chronic | ICD-10-CM

## 2018-05-30 DIAGNOSIS — Z12.11 SCREENING FOR COLON CANCER: Primary | ICD-10-CM

## 2018-05-30 DIAGNOSIS — J44.9 CHRONIC OBSTRUCTIVE PULMONARY DISEASE, UNSPECIFIED COPD TYPE (HCC): ICD-10-CM

## 2018-05-30 DIAGNOSIS — I10 ESSENTIAL HYPERTENSION: Chronic | ICD-10-CM

## 2018-05-30 PROCEDURE — 99214 OFFICE O/P EST MOD 30 MIN: CPT | Performed by: INTERNAL MEDICINE

## 2018-05-30 PROCEDURE — 3008F BODY MASS INDEX DOCD: CPT | Performed by: INTERNAL MEDICINE

## 2018-05-30 PROCEDURE — 1111F DSCHRG MED/CURRENT MED MERGE: CPT | Performed by: INTERNAL MEDICINE

## 2018-05-30 NOTE — PROGRESS NOTES
CLINIC VISIT NOTE  Jesica Jackson 59 y o  male   MRN: [id number]    ASSESSMENT/PLAN:  Diagnoses and all orders for this visit:    1  Screening for colon cancer  · Has FIT kit at home that was provided to him previously  · Patient instructed to complete the FIT test and submit which he agreed to    2  DAVID (obstructive sleep apnea)  · Compliant with CPAP at home but states he needs a new machine  · Has order form for sleep study at home per patient  · Instructed him to schedule at as soon as possible to get new CPAP machine fitted  · Ambulatory referral to Pulmonology; Future    3  Essential hypertension  · BP well controlled in office today  · Continue Cozaar 100 mg daily, HCTZ 25 mg daily, and amlodipine 10 mg daily    4  Coronary artery disease involving native heart with angina pectoris, unspecified vessel or lesion type ([facility])  · Stable disease  · ECHO in April 2018 showed mildly dilated LV with normal systolic function and LVEF 75%, no RWMA, G1DD, trace MR, mild AR, and mild dilation of aortic root at 42 mm  · Recent lipid panel ([facility]) showed normal lipid values  · Continue atorvastatin and aspirin  · Ambulatory referral to Cardiology; Future    5  Hyperlipidemia, unspecified hyperlipidemia type  · Recently performed at [facility]  · All lipid values with normal range  · Continue atorvastatin    6  Chronic respiratory failure with hypoxia ([facility])  · Multifactorial etiology including longstanding DAVID, COPD  · Has supplemental oxygen at home that is used as needed  · Advised continued management with oxygen and pulm meds  · Ambulatory referral to Pulmonology; Future    7  Chronic obstructive pulmonary disease, unspecified COPD type ([facility] )  · Well-controlled at this time  · No acute exacerbation  · Continue Dulera, prednisone taper, Ventolin inhaler prn, and atrovent nebulizer  · Ambulatory referral to Pulmonology; Future    8   Chronic gout without tophus, unspecified cause, unspecified site  · No acute flare  · Continue allopurinol 100 mg QID      Health Maintenance:  · Due for colon cancer screening - has FIT kit at home, will send in  · UTD with vaccinations    Schedule a follow-up appointment in 3 months  Chief Complaint: Hospital follow-up, follow-up of chronic conditions    History of Present Illness:  59year old male presents to office for follow-up of chronic conditions and for hospital follow-up  Patient was recently hospitalized for worsening acute hypoxia believed to be due a community-acquired pneumonia  Patient's respiratory status improved during his hospitalization  He did qualify for supplemental oxygen during that admission and was set up with home oxygen which he uses as needed  He has been compliant with his respiratory medications and has had no significant issues with his breathing since discharge  He has been compliant with all of his other medications as well  Of note, patient established care with new PCP at [facility] (Dr Tan Patterson) after his discharge, but he expresses desire to stay within [address] since all of his care has been through here  He states he will call to change the PCP with his insurance  Review of Systems   Respiratory: Positive for shortness of breath (With exertion)  All other systems reviewed and are negative  Objective:  Vitals:    05/30/18 1051   BP: 140/84   Pulse: 80   Temp: 98 °F (36 7 °C)   Weight: 115 kg (253 lb 15 5 oz)   Height: 5' 9" (1 753 m)     Physical Exam   Constitutional: He is oriented to person, place, and time  He appears well-developed and well-nourished  No distress  HENT:   Head: Normocephalic and atraumatic  Mouth/Throat: Oropharynx is clear and moist  No oropharyngeal exudate  Eyes: Conjunctivae and EOM are normal  No scleral icterus  Neck: Neck supple  No JVD present  No tracheal deviation present  Cardiovascular: Normal rate, regular rhythm, normal heart sounds and intact distal pulses  Pulmonary/Chest: Effort normal and breath sounds normal  No respiratory distress  He has no wheezes  He has no rales  He exhibits no tenderness  Abdominal: Soft  Bowel sounds are normal  He exhibits no distension  There is no tenderness  There is no rebound and no guarding  Obese   Musculoskeletal: He exhibits no edema, tenderness or deformity  Neurological: He is alert and oriented to person, place, and time  No cranial nerve deficit  Skin: Skin is warm and dry  No rash noted  He is not diaphoretic  No erythema  No pallor  Psychiatric: He has a normal mood and affect  His behavior is normal  Judgment and thought content normal    Nursing note and vitals reviewed          Current Outpatient Prescriptions:     allopurinol (ZYLOPRIM) 100 mg tablet, Take 1 tablet (100 mg total) by mouth 3 (three) times a day (Patient taking differently: Take 100 mg by mouth 4 (four) times a day  ), Disp: 90 tablet, Rfl: 3    amLODIPine (NORVASC) 10 mg tablet, Take 10 mg by mouth daily For blood pressure, Disp: , Rfl: 3    aspirin (ECOTRIN LOW STRENGTH) 81 mg EC tablet, TAKE 1 TABLET BY MOUTH ONCE A DAY, Disp: , Rfl:     atorvastatin (LIPITOR) 40 mg tablet, Take 1 tablet (40 mg total) by mouth daily before dinner, Disp: 30 tablet, Rfl: 0    doxazosin (CARDURA) 4 mg tablet, TAKE 1 TABLET BY MOUTH DAILY, Disp: 30 tablet, Rfl: 6    gabapentin (NEURONTIN) 100 mg capsule, Take 1 capsule (100 mg total) by mouth 3 (three) times a day (Patient taking differently: Take 100 mg by mouth daily at bedtime  ), Disp: 90 capsule, Rfl: 0    hydrochlorothiazide (HYDRODIURIL) 25 mg tablet, Take 1 tablet (25 mg total) by mouth daily, Disp: 30 tablet, Rfl: 0    ipratropium (ATROVENT) 0 02 % nebulizer solution, Take 2 5 mL (0 5 mg total) by nebulization 3 (three) times a day as needed for wheezing or shortness of breath for up to 30 days, Disp: 75 mL, Rfl: 0    losartan (COZAAR) 100 MG tablet, Take 1 tablet (100 mg total) by mouth daily, Disp: 30 tablet, Rfl: 0    Mometasone Furo-Formoterol Fum (DULERA) 100-5 MCG/ACT AERO, Inhale 1 puff 2 (two) times a day, Disp: 1 Inhaler, Rfl: 4    predniSONE 10 mg tablet, 4 tabs via mouth once daily x 1 day, 3 tabs via mouth once daily x 3 days, 2 tabs via mouthonce daily x 3 days, 1 tab oral, once daily x 3 days and STOP , Disp: 22 tablet, Rfl: 0    VENTOLIN  (90 Base) MCG/ACT inhaler, INHALE 2 PUFFS EVERY 4-6 HOURS AS NEEDED , Disp: 18 Inhaler, Rfl: 0    acetaminophen (TYLENOL) 325 mg tablet, Take 3 tablets (975 mg total) by mouth 2 (two) times a day as needed for mild pain, Disp: 180 tablet, Rfl: 3    Past Medical History:   Diagnosis Date    CAD (coronary artery disease)     COPD (chronic obstructive pulmonary disease) (HCC)     Gout     Hyperlipidemia     Hypertension     Orthopnea     last assessed: 8/17/16    Pericardial effusion     Sleep apnea      Past Surgical History:   Procedure Laterality Date    CARDIAC CATHETERIZATION       Social History     Social History    Marital status: Single     Spouse name: N/A    Number of children: 3    Years of education: N/A     Occupational History    Unemployed, not looking for work      Social History Main Topics    Smoking status: Former Smoker     Packs/day: 0 20     Years: 46 00     Types: Cigarettes     Quit date: 6/30/2015    Smokeless tobacco: Never Used    Alcohol use No      Comment: quit 3 years ago   Drug use: No    Sexual activity: No      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

## 2018-05-30 NOTE — PATIENT INSTRUCTIONS
COPD (Chronic Obstructive Pulmonary Disease)   AMBULATORY CARE:   COPD (chronic obstructive pulmonary disease)  is a lung disease that makes it hard for you to breathe  COPD is usually a result of lung damage caused by years of irritation and inflammation  COPD limits air flow in your lungs  Smoking, pollution, genetics, or a history of lung infections can increase your risk for COPD  Common symptoms include the following:   · Shortness of breath     · A dry cough     · Coughing fits that bring up mucus from your lungs     · Wheezing and chest tightness  Call 911 if:   · You feel lightheaded, short of breath, and have chest pain  Seek care immediately if:   · You are confused, dizzy, or feel faint  · Your arm or leg feels warm, tender, and painful  It may look swollen and red  · You cough up blood  Contact your healthcare provider if:   · You have more shortness of breath than usual      · You need more medicine than usual to control your symptoms  · You are coughing or wheezing more than usual      · You are coughing up more mucus, or it is a different color or has a different odor  · You gain more than 3 pounds in a week  · You have a fever, a runny or stuffy nose, and a sore throat, or other cold or flu symptoms  · Your skin, lips, or nails start to turn blue  · You have swelling in your legs or ankles  · You are very tired or weak for more than a day  · You notice changes in your mood, or changes in your ability to think or concentrate  · You have questions or concerns about your condition or care  Treatment for COPD  may include medicines to help decrease swelling and inflammation in your lungs  Medicines may also help open your airways or treat and infection  You may need pulmonary rehabilitation to help you manage your symptoms and improve your quality of life  You may need extra oxygen to help you breathe easier    Help make breathing easier:   · Use pursed-lip breathing any time you feel short of breath  Take a deep breath in through your nose  Slowly breathe out through your mouth with your lips pursed for twice as long as you inhaled  You can also practice this breathing pattern while you bend, lift, climb stairs, or exercise  It slows down your breathing and helps move more air in and out of your lungs  · Do not smoke, and avoid others who smoke  Nicotine and other substances can cause lung irritation or damage and make it harder for you to breathe  Do not use e-cigarettes or smokeless tobacco  They still contain nicotine  Ask your healthcare provider for information if you currently smoke and need help to quit  For support and more information:  ¨ [employer]  Phone: 4- 864 - 540-3730  Web Address: [employer]      · Be aware of and avoid anything that makes your symptoms worse  Stay out of high altitudes and places with high humidity  Stay inside, or cover your mouth and nose with a scarf when you are outside during cold weather  Stay inside on days when air pollution or pollen counts are high  Do not use aerosol sprays such as deodorant, bug spray, and hair spray  Manage COPD and help prevent exacerbations:  COPD is a serious condition that gets worse over time  A COPD exacerbation means your symptoms suddenly get worse  It is important to prevent exacerbations  An exacerbation can cause more lung damage  COPD cannot be cured, but you can take action to feel better and prevent COPD exacerbations:  · Protect yourself from germs  Germs can get into your lungs and cause an infection  An infection in your lungs can create more mucus and make it harder to breathe  An infection can also create swelling in your airways and prevent air from getting in  You can decrease your risk for infection by doing the following:     [facility] your hands often with soap and water  Carry germ-killing gel with you   You can use the gel to clean your hands when soap and water are not available  ¨ Do not touch your eyes, nose, or mouth unless you have washed your hands first      ¨ Always cover your mouth when you cough  Cough into a tissue or your shirtsleeve so you do not spread germs from your hands  ¨ Try to avoid people who have a cold or the flu  If you are sick, stay away from others as much as possible  · Drink more liquids  This will help to keep your air passages moist and help you cough up mucus  Ask how much liquid to drink each day and which liquids are best for you  · Exercise daily  Exercise for at least 20 minutes each day to help increase your energy and decrease shortness of breath  Talk to your healthcare provider about the best exercise plan for you  · Ask about vaccines  Your healthcare provider may recommend that you get regular flu and pneumonia vaccines  Pneumonia can become life-threatening for a person who has COPD  Ask about other vaccines you may need  Ask your healthcare provider about the flu and pneumonia vaccines  All adults should get the flu (influenza) vaccine every year as soon as it becomes available  The pneumonia vaccine is given to adults aged 72 or older to prevent pneumococcal disease, such as pneumonia  Adults aged 23 to 59 years who are at high risk for pneumococcal disease also should get the pneumococcal vaccine  It may need to be repeated 1 or 5 years later  Pulmonary rehabilitation:  Your healthcare provider may recommend a program to help you manage your symptoms and improve your quality of life  It may include nutritional counseling and exercise, such as walking, to strengthen your lungs  Make decisions about your choices for future treatment:  Ask for information about advanced medical directives and living patten  These documents help you decide and write down your choices for treatment and end-of-life care  It is best to complete them when you feel well and can think clearly about your wishes   The information can then be kept for future use if you are in the hospital or become very ill  Follow up with your healthcare provider as directed: You may need more tests  Your healthcare provider may refer you to a pulmonary (lung) specialist  Write down your questions so you remember to ask them during your visits  © 2017 [address] [employer] Information is for End User's use only and may not be sold, redistributed or otherwise used for commercial purposes  All illustrations and images included in CareNotes® are the copyrighted property of [employer]  or Sam James  The above information is an  only  It is not intended as medical advice for individual conditions or treatments  Talk to your doctor, nurse or pharmacist before following any medical regimen to see if it is safe and effective for you

## 2018-06-14 DIAGNOSIS — I25.119 CORONARY ARTERY DISEASE INVOLVING NATIVE HEART WITH ANGINA PECTORIS, UNSPECIFIED VESSEL OR LESION TYPE (HCC): ICD-10-CM

## 2018-06-14 RX ORDER — ATORVASTATIN CALCIUM 40 MG/1
40 TABLET, FILM COATED ORAL
Qty: 90 TABLET | Refills: 2 | Status: SHIPPED | OUTPATIENT
Start: 2018-06-14 | End: 2018-07-30 | Stop reason: SDUPTHER

## 2018-06-18 DIAGNOSIS — J44.1 COPD WITH ACUTE EXACERBATION (HCC): ICD-10-CM

## 2018-06-26 ENCOUNTER — TELEPHONE (OUTPATIENT)
Dept: INTERNAL MEDICINE CLINIC | Facility: CLINIC | Age: 58
End: 2018-06-26

## 2018-06-26 NOTE — TELEPHONE ENCOUNTER
PATIENT CALLED IN REQUESTING THAT YOU CALL IN ALLOPURINOL 4 TIMES DAILY, STATES HE IS ALMOST OUT AND HE NEEDS THIS SENT IN

## 2018-06-26 NOTE — TELEPHONE ENCOUNTER
AS PER PATIENT , HE NEEDS A NEW ORDER FOR A NEW CPAP MACHINE   HE SPOKE WITH [facility] AND THEY INFORMED HIM THAT HE NEEDS AN ORDER FROM A PROVIDER

## 2018-06-28 DIAGNOSIS — G47.33 OBSTRUCTIVE SLEEP APNEA: Primary | ICD-10-CM

## 2018-06-28 DIAGNOSIS — M10.9 GOUTY ARTHRITIS: ICD-10-CM

## 2018-06-28 RX ORDER — ALLOPURINOL 100 MG/1
100 TABLET ORAL 3 TIMES DAILY
Qty: 90 TABLET | Refills: 2 | Status: SHIPPED | OUTPATIENT
Start: 2018-06-28 | End: 2018-09-07 | Stop reason: SDUPTHER

## 2018-06-28 NOTE — PROGRESS NOTES
Received message from clinic staff regarding patient's request for new order for CPAP machine  Per my last OV note, patient had order form for new sleep study to be done to have fitting for CPAP mask  Unclear if patient had this performed and I do not see any notes/results in his chart  Per patient's request, states [facility] needs order from provider for new CPAP  Order for CPAP reorder placed  Included instructions to supple same sized mask and supplies as last CPAP  Will follow-up to see if order was appropriately placed and received

## 2018-06-28 NOTE — TELEPHONE ENCOUNTER
I placed order for CPAP DME  Not sure if this will go through or if Stacey Ortega received the order or not  If there are any issues, please let me know  Thanks

## 2018-07-12 NOTE — PROGRESS NOTES
Refill for allopurinol sent to pharmacy per request  Principal Discharge DX:	Dysuria  Secondary Diagnosis:	Abdominal pain

## 2018-07-30 DIAGNOSIS — I10 ESSENTIAL HYPERTENSION: Chronic | ICD-10-CM

## 2018-07-30 DIAGNOSIS — I25.119 CORONARY ARTERY DISEASE INVOLVING NATIVE HEART WITH ANGINA PECTORIS, UNSPECIFIED VESSEL OR LESION TYPE (HCC): ICD-10-CM

## 2018-07-30 DIAGNOSIS — J44.1 COPD WITH ACUTE EXACERBATION (HCC): ICD-10-CM

## 2018-07-30 DIAGNOSIS — M10.9 GOUTY ARTHRITIS: ICD-10-CM

## 2018-07-30 RX ORDER — AMLODIPINE BESYLATE 10 MG/1
10 TABLET ORAL DAILY
Qty: 90 TABLET | Refills: 1 | Status: SHIPPED | OUTPATIENT
Start: 2018-07-30 | End: 2018-09-07 | Stop reason: SDUPTHER

## 2018-07-30 RX ORDER — GABAPENTIN 100 MG/1
100 CAPSULE ORAL 3 TIMES DAILY
Qty: 90 CAPSULE | Refills: 0 | Status: SHIPPED | OUTPATIENT
Start: 2018-07-30 | End: 2018-09-07 | Stop reason: SDUPTHER

## 2018-07-30 RX ORDER — LOSARTAN POTASSIUM 100 MG/1
100 TABLET ORAL DAILY
Qty: 90 TABLET | Refills: 1 | Status: SHIPPED | OUTPATIENT
Start: 2018-07-30 | End: 2018-08-09 | Stop reason: SDUPTHER

## 2018-07-30 RX ORDER — ATORVASTATIN CALCIUM 40 MG/1
40 TABLET, FILM COATED ORAL
Qty: 90 TABLET | Refills: 1 | Status: SHIPPED | OUTPATIENT
Start: 2018-07-30 | End: 2018-09-07 | Stop reason: SDUPTHER

## 2018-07-31 NOTE — TELEPHONE ENCOUNTER
Chart reviewed   Refills for Norvasc, losartan, atorvastatin, and gabapentin sent to pharmacy per request

## 2018-08-07 ENCOUNTER — TELEPHONE (OUTPATIENT)
Dept: INTERNAL MEDICINE CLINIC | Facility: CLINIC | Age: 58
End: 2018-08-07

## 2018-08-08 ENCOUNTER — PATIENT OUTREACH (OUTPATIENT)
Dept: INTERNAL MEDICINE CLINIC | Facility: CLINIC | Age: 58
End: 2018-08-08

## 2018-08-08 NOTE — PROGRESS NOTES
ZEYNEP/CM has received call from pt who is requesting help with his [employer] Utility which has been cut off  Pt reports this is the power souce for his heat , hot water and cooking  Pt request help as he feels his medical conditions including COPD/ Sleep apnea will  worsen if he can  not have hot water or a way to prepare his food  He request MD assistance  re having power turned back on  SW discussed same with DR Felix Henry who agreed    has called [employer] [phone] customer # [phone] and requested certification form  [facility] so they may expedite form to our office  When received and MD completes form it will need to be FAXED Back   Must inform Pt so he ca ncontact [employer] to confirm receipt and set up date and time to restore service  SW will ask TEam to help with same when form signed  Pt relates he also has a shut off notice for PP&L  SW reviewed with pt Office Process for help with same  Pt will bring this notice in  Pt relates he has gotten behind because he had to wait so long for his SSD to start  He and his S/O are  disabled so finances are very thist  Pt not eligible for SNAP or [place] he reports  Pt is aware [employer] can help him with past due bill with [employer] once power is back on  Pt informed of programs through [employer], [employer] , and possible help with the [address]  Pt relates he was over income for [employer] but will reapply  He will also set payment plan with [employer]  Pt hopes to get caught up with his utilities next month when he gets his full SS benefits  SW to remain avialalbe to assist as indicted  ADDENDUM  [employer] Certification faxed in this date and confirmation received  Pt informed to call [employer] to start service

## 2018-08-08 NOTE — Clinical Note
Dear Emmanuel Arrington and Team,  [facility] will fax form to us for your completion Dr Joel Stevens  Team can you fax it back to the # the will provide  Also, Team can you call pt to inform him of same  He will need to call JAREKI to set up time for restart his service  They need to go to him home for same  Thanks for your help!  Caridad Wright

## 2018-08-09 DIAGNOSIS — I10 ESSENTIAL HYPERTENSION: Chronic | ICD-10-CM

## 2018-08-09 RX ORDER — LOSARTAN POTASSIUM 100 MG/1
100 TABLET ORAL DAILY
Qty: 90 TABLET | Refills: 1 | Status: SHIPPED | OUTPATIENT
Start: 2018-08-09 | End: 2018-09-07 | Stop reason: SDUPTHER

## 2018-08-09 NOTE — PROGRESS NOTES
Refill for losartan sent to pharmacy  Previous refill order was accidentally printed rather than sent to pharmacy electronically

## 2018-08-14 ENCOUNTER — TELEPHONE (OUTPATIENT)
Dept: INTERNAL MEDICINE CLINIC | Facility: CLINIC | Age: 58
End: 2018-08-14

## 2018-08-14 NOTE — TELEPHONE ENCOUNTER
Please review (MEDICAL CERTIFICATION REQUEST/ PPL SHUT OFF NOTICE) form placed in the (RED) team folder in the provider flow station  ([id number] [facility] [phone]) when form is complete  If the form requires a signature by a MD or DO, please review it with them and sign the form  Please place the form in the (RED) team folder in the Clinical flow station at the [address] [facility] and reply to this task to the Clinical Pool

## 2018-08-14 NOTE — TELEPHONE ENCOUNTER
I am currently not in the clinic  Please forward this to the resident covering RED team tasks in the clinic  If the resident covering tasks is unsure of how to complete the form, please let me know and I will try to assist     Thanks

## 2018-09-07 DIAGNOSIS — I10 HYPERTENSION, UNSPECIFIED TYPE: ICD-10-CM

## 2018-09-07 DIAGNOSIS — J44.1 COPD WITH ACUTE EXACERBATION (HCC): ICD-10-CM

## 2018-09-07 DIAGNOSIS — M10.9 GOUTY ARTHRITIS: ICD-10-CM

## 2018-09-07 DIAGNOSIS — J44.9 CHRONIC OBSTRUCTIVE PULMONARY DISEASE, UNSPECIFIED COPD TYPE (HCC): ICD-10-CM

## 2018-09-07 DIAGNOSIS — I10 ESSENTIAL HYPERTENSION: Chronic | ICD-10-CM

## 2018-09-07 DIAGNOSIS — I25.119 CORONARY ARTERY DISEASE INVOLVING NATIVE HEART WITH ANGINA PECTORIS, UNSPECIFIED VESSEL OR LESION TYPE (HCC): ICD-10-CM

## 2018-09-07 RX ORDER — LOSARTAN POTASSIUM 100 MG/1
100 TABLET ORAL DAILY
Qty: 90 TABLET | Refills: 1 | Status: SHIPPED | OUTPATIENT
Start: 2018-09-07 | End: 2018-11-01 | Stop reason: SDUPTHER

## 2018-09-07 RX ORDER — ATORVASTATIN CALCIUM 40 MG/1
TABLET, FILM COATED ORAL
COMMUNITY
Start: 2018-05-21 | End: 2020-01-28 | Stop reason: SDUPTHER

## 2018-09-07 RX ORDER — LOSARTAN POTASSIUM 100 MG/1
TABLET ORAL
COMMUNITY
Start: 2018-03-16 | End: 2018-11-01 | Stop reason: SDUPTHER

## 2018-09-07 RX ORDER — DOXAZOSIN MESYLATE 4 MG/1
4 TABLET ORAL DAILY
Qty: 90 TABLET | Refills: 1 | Status: SHIPPED | OUTPATIENT
Start: 2018-09-07 | End: 2018-11-01 | Stop reason: SDUPTHER

## 2018-09-07 RX ORDER — ALLOPURINOL 100 MG/1
100 TABLET ORAL 3 TIMES DAILY
Qty: 90 TABLET | Refills: 2 | Status: SHIPPED | OUTPATIENT
Start: 2018-09-07 | End: 2018-11-01 | Stop reason: SDUPTHER

## 2018-09-07 RX ORDER — DOXAZOSIN MESYLATE 4 MG/1
TABLET ORAL
COMMUNITY
Start: 2018-03-29 | End: 2018-11-01 | Stop reason: SDUPTHER

## 2018-09-07 RX ORDER — HYDROCHLOROTHIAZIDE 25 MG/1
25 TABLET ORAL DAILY
Qty: 90 TABLET | Refills: 1 | Status: SHIPPED | OUTPATIENT
Start: 2018-09-07 | End: 2018-11-01 | Stop reason: ALTCHOICE

## 2018-09-07 RX ORDER — ALLOPURINOL 100 MG/1
100 TABLET ORAL
COMMUNITY
Start: 2018-02-01 | End: 2018-11-01 | Stop reason: SDUPTHER

## 2018-09-07 RX ORDER — HYDROCHLOROTHIAZIDE 25 MG/1
25 TABLET ORAL
COMMUNITY
Start: 2018-04-16 | End: 2018-11-01 | Stop reason: ALTCHOICE

## 2018-09-07 RX ORDER — ATORVASTATIN CALCIUM 40 MG/1
40 TABLET, FILM COATED ORAL
Qty: 90 TABLET | Refills: 1 | Status: SHIPPED | OUTPATIENT
Start: 2018-09-07 | End: 2020-11-02 | Stop reason: SDUPTHER

## 2018-09-07 RX ORDER — AMLODIPINE BESYLATE 10 MG/1
10 TABLET ORAL DAILY
Qty: 90 TABLET | Refills: 1 | Status: SHIPPED | OUTPATIENT
Start: 2018-09-07 | End: 2018-11-01 | Stop reason: SDUPTHER

## 2018-09-07 RX ORDER — ALBUTEROL SULFATE 90 UG/1
AEROSOL, METERED RESPIRATORY (INHALATION)
COMMUNITY
Start: 2018-04-25 | End: 2018-09-07 | Stop reason: SDUPTHER

## 2018-09-07 RX ORDER — ALBUTEROL SULFATE 90 UG/1
2 AEROSOL, METERED RESPIRATORY (INHALATION) EVERY 6 HOURS PRN
Qty: 8 G | Refills: 5 | Status: SHIPPED | OUTPATIENT
Start: 2018-09-07 | End: 2018-11-01 | Stop reason: SDUPTHER

## 2018-09-07 RX ORDER — GABAPENTIN 100 MG/1
100 CAPSULE ORAL 3 TIMES DAILY
Qty: 90 CAPSULE | Refills: 2 | Status: SHIPPED | OUTPATIENT
Start: 2018-09-07 | End: 2018-11-01 | Stop reason: SDUPTHER

## 2018-09-07 RX ORDER — AMLODIPINE BESYLATE 10 MG/1
TABLET ORAL
COMMUNITY
Start: 2018-05-21 | End: 2018-11-01 | Stop reason: SDUPTHER

## 2018-09-07 RX ORDER — GABAPENTIN 100 MG/1
100 CAPSULE ORAL
COMMUNITY
Start: 2018-03-30 | End: 2018-11-01 | Stop reason: SDUPTHER

## 2018-09-11 ENCOUNTER — PATIENT OUTREACH (OUTPATIENT)
Dept: INTERNAL MEDICINE CLINIC | Facility: CLINIC | Age: 58
End: 2018-09-11

## 2018-09-11 NOTE — PROGRESS NOTES
ZEYNEP has received call from pt requesting help with finding resources to help pay his utilities bills  ZEYNEP has given resources including [employer], Sergio Dougherty, [address]  [employer] and Kelvin ADHIKARI has also  suggested checking with any of his Jehovah's witness    Pt relates he is on the [employer] program

## 2018-09-13 ENCOUNTER — TELEPHONE (OUTPATIENT)
Dept: INTERNAL MEDICINE CLINIC | Facility: CLINIC | Age: 58
End: 2018-09-13

## 2018-09-13 DIAGNOSIS — J30.2 SEASONAL ALLERGIC RHINITIS, UNSPECIFIED TRIGGER: Primary | ICD-10-CM

## 2018-09-13 DIAGNOSIS — Z86.69 HISTORY OF OBSTRUCTIVE SLEEP APNEA: Primary | ICD-10-CM

## 2018-09-13 RX ORDER — LORATADINE 10 MG/1
10 TABLET ORAL DAILY
Qty: 30 TABLET | Refills: 2 | Status: SHIPPED | OUTPATIENT
Start: 2018-09-13 | End: 2018-12-06 | Stop reason: SDUPTHER

## 2018-09-13 NOTE — TELEPHONE ENCOUNTER
Patient called in requesting refill on Loratadine 10 mg      Also wants to clarify if he is supposed to continue prednisone or not ? He has 1 tablet left

## 2018-09-13 NOTE — TELEPHONE ENCOUNTER
Sent order for loratadine to pharmacy  He should not be continued on prednisone  Let me know to finish the last tablet but he does not need to be on it chronically  Thanks

## 2018-09-13 NOTE — PROGRESS NOTES
Per request, new order placed for sleep study  Patient with previous diagnosis of DAVID with use of CPAP at home but at recent visits, patient expressed that machine is old and needs new one  Was previously on setting of 16 according to previous notes  I have already placed DME order for new CPAP machine several weeks ago

## 2018-09-14 NOTE — TELEPHONE ENCOUNTER
I printed the order sheet and placed it at your desk  The order is listed under "Referrals"  Referral for diagnostic sleep study with parameters  Thanks

## 2018-09-19 ENCOUNTER — TELEPHONE (OUTPATIENT)
Dept: SLEEP CENTER | Facility: CLINIC | Age: 58
End: 2018-09-19

## 2018-09-19 NOTE — TELEPHONE ENCOUNTER
----- Message from Naveed Infante MD sent at 9/19/2018  1:01 PM EDT -----  Approved  ----- Message -----  From: Jef Butler: 9/17/2018  10:22 AM  To: Sleep Medicine Pauline Nayak, #    PLEASE REVIEW FOR APPROVAL OR DENIAL AND WHY

## 2018-10-09 ENCOUNTER — APPOINTMENT (EMERGENCY)
Dept: NON INVASIVE DIAGNOSTICS | Facility: HOSPITAL | Age: 58
End: 2018-10-09
Payer: COMMERCIAL

## 2018-10-09 ENCOUNTER — HOSPITAL ENCOUNTER (EMERGENCY)
Facility: HOSPITAL | Age: 58
Discharge: HOME | End: 2018-10-09
Attending: EMERGENCY MEDICINE | Admitting: EMERGENCY MEDICINE
Payer: COMMERCIAL

## 2018-10-09 VITALS
TEMPERATURE: 98.8 F | OXYGEN SATURATION: 95 % | HEART RATE: 64 BPM | SYSTOLIC BLOOD PRESSURE: 162 MMHG | WEIGHT: 230 LBS | BODY MASS INDEX: 34.86 KG/M2 | HEIGHT: 68 IN | DIASTOLIC BLOOD PRESSURE: 86 MMHG | RESPIRATION RATE: 16 BRPM

## 2018-10-09 DIAGNOSIS — M79.89 LEFT LEG SWELLING: ICD-10-CM

## 2018-10-09 DIAGNOSIS — M79.605 LEFT LEG PAIN: Primary | ICD-10-CM

## 2018-10-09 DIAGNOSIS — L53.9 LEG ERYTHEMA: ICD-10-CM

## 2018-10-09 LAB
ANION GAP SERPL CALCULATED.3IONS-SCNC: 4 MMOL/L (ref 4–13)
BASOPHILS # BLD AUTO: 0.06 THOUSANDS/ΜL (ref 0–0.1)
BASOPHILS NFR BLD AUTO: 1 % (ref 0–1)
BUN SERPL-MCNC: 15 MG/DL (ref 5–25)
CALCIUM SERPL-MCNC: 9.2 MG/DL (ref 8.3–10.1)
CHLORIDE SERPL-SCNC: 104 MMOL/L (ref 100–108)
CO2 SERPL-SCNC: 30 MMOL/L (ref 21–32)
CREAT SERPL-MCNC: 1.22 MG/DL (ref 0.6–1.3)
EOSINOPHIL # BLD AUTO: 0.28 THOUSAND/ΜL (ref 0–0.61)
EOSINOPHIL NFR BLD AUTO: 4 % (ref 0–6)
ERYTHROCYTE [DISTWIDTH] IN BLOOD BY AUTOMATED COUNT: 12.5 % (ref 11.6–15.1)
GFR SERPL CREATININE-BSD FRML MDRD: 64 ML/MIN/1.73SQ M
GLUCOSE SERPL-MCNC: 89 MG/DL (ref 65–140)
HCT VFR BLD AUTO: 47.8 % (ref 36.5–49.3)
HGB BLD-MCNC: 15.4 G/DL (ref 12–17)
IMM GRANULOCYTES # BLD AUTO: 0.02 THOUSAND/UL (ref 0–0.2)
IMM GRANULOCYTES NFR BLD AUTO: 0 % (ref 0–2)
LYMPHOCYTES # BLD AUTO: 1.59 THOUSANDS/ΜL (ref 0.6–4.47)
LYMPHOCYTES NFR BLD AUTO: 20 % (ref 14–44)
MCH RBC QN AUTO: 30.2 PG (ref 26.8–34.3)
MCHC RBC AUTO-ENTMCNC: 32.2 G/DL (ref 31.4–37.4)
MCV RBC AUTO: 94 FL (ref 82–98)
MONOCYTES # BLD AUTO: 0.87 THOUSAND/ΜL (ref 0.17–1.22)
MONOCYTES NFR BLD AUTO: 11 % (ref 4–12)
NEUTROPHILS # BLD AUTO: 5.27 THOUSANDS/ΜL (ref 1.85–7.62)
NEUTS SEG NFR BLD AUTO: 64 % (ref 43–75)
NRBC BLD AUTO-RTO: 0 /100 WBCS
PLATELET # BLD AUTO: 208 THOUSANDS/UL (ref 149–390)
PMV BLD AUTO: 11.2 FL (ref 8.9–12.7)
POTASSIUM SERPL-SCNC: 3.7 MMOL/L (ref 3.5–5.3)
RBC # BLD AUTO: 5.1 MILLION/UL (ref 3.88–5.62)
SODIUM SERPL-SCNC: 138 MMOL/L (ref 136–145)
WBC # BLD AUTO: 8.09 THOUSAND/UL (ref 4.31–10.16)

## 2018-10-09 PROCEDURE — 96374 THER/PROPH/DIAG INJ IV PUSH: CPT

## 2018-10-09 PROCEDURE — 99284 EMERGENCY DEPT VISIT MOD MDM: CPT

## 2018-10-09 PROCEDURE — 80048 BASIC METABOLIC PNL TOTAL CA: CPT | Performed by: EMERGENCY MEDICINE

## 2018-10-09 PROCEDURE — 93971 EXTREMITY STUDY: CPT | Performed by: SURGERY

## 2018-10-09 PROCEDURE — 85025 COMPLETE CBC W/AUTO DIFF WBC: CPT | Performed by: EMERGENCY MEDICINE

## 2018-10-09 PROCEDURE — 36415 COLL VENOUS BLD VENIPUNCTURE: CPT | Performed by: EMERGENCY MEDICINE

## 2018-10-09 PROCEDURE — 93971 EXTREMITY STUDY: CPT

## 2018-10-09 RX ORDER — KETOROLAC TROMETHAMINE 30 MG/ML
15 INJECTION, SOLUTION INTRAMUSCULAR; INTRAVENOUS ONCE
Status: COMPLETED | OUTPATIENT
Start: 2018-10-09 | End: 2018-10-09

## 2018-10-09 RX ORDER — NAPROXEN 500 MG/1
500 TABLET ORAL 2 TIMES DAILY WITH MEALS
Qty: 14 TABLET | Refills: 0 | Status: SHIPPED | OUTPATIENT
Start: 2018-10-09 | End: 2018-10-22 | Stop reason: SDUPTHER

## 2018-10-09 RX ORDER — COLCHICINE 0.6 MG/1
0.6 TABLET ORAL DAILY
Qty: 7 TABLET | Refills: 0 | Status: SHIPPED | OUTPATIENT
Start: 2018-10-09 | End: 2018-11-01 | Stop reason: ALTCHOICE

## 2018-10-09 RX ORDER — MORPHINE SULFATE 15 MG/1
15 TABLET ORAL EVERY 4 HOURS PRN
Qty: 10 TABLET | Refills: 0 | Status: SHIPPED | OUTPATIENT
Start: 2018-10-09 | End: 2018-10-12

## 2018-10-09 RX ADMIN — KETOROLAC TROMETHAMINE 15 MG: 30 INJECTION, SOLUTION INTRAMUSCULAR at 15:59

## 2018-10-09 NOTE — DISCHARGE INSTRUCTIONS
Leg Pain   WHAT YOU NEED TO KNOW:   Leg pain may be caused by a variety of health conditions  Your tests did not show any broken bones or blood clots  DISCHARGE INSTRUCTIONS:   Return to the emergency department if:   · You have a fever  · Your leg starts to swell  · Your leg pain gets worse  · You have numbness or tingling in your leg or toes  · You cannot put any weight on or move your leg  Contact your healthcare provider if:   · Your pain does not decrease, even after treatment  · You have questions or concerns about your condition or care  Medicines:   · NSAIDs , such as ibuprofen, help decrease swelling, pain, and fever  This medicine is available with or without a doctor's order  NSAIDs can cause stomach bleeding or kidney problems in certain people  If you take blood thinner medicine, always ask your healthcare provider if NSAIDs are safe for you  Always read the medicine label and follow directions  · Take your medicine as directed  Contact your healthcare provider if you think your medicine is not helping or if you have side effects  Tell him of her if you are allergic to any medicine  Keep a list of the medicines, vitamins, and herbs you take  Include the amounts, and when and why you take them  Bring the list or the pill bottles to follow-up visits  Carry your medicine list with you in case of an emergency  Follow up with your healthcare provider as directed: You may need more tests to find the cause of your leg pain  You may need to see an orthopedic specialist or a physical therapist  Write down your questions so you remember to ask them during your visits  Manage your leg pain:   · Rest  your injured leg so that it can heal  You may need an immobilizer, brace, or splint to limit the movement of your leg  You may need to avoid putting any weight on your leg for at least 48 hours  Return to normal activities as directed      · Ice  the injury for 20 minutes every 4 hours for up to 24 hours, or as directed  Use an ice pack, or put crushed ice in a plastic bag  Cover it with a towel to protect your skin  Ice helps prevent tissue damage and decreases swelling and pain  · Elevate  your injured leg above the level of your heart as often as you can  This will help decrease swelling and pain  If possible, prop your leg on pillows or blankets to keep the area elevated comfortably  · Use assistive devices as directed  You may need to use a cane or crutches  Assistive devices help decrease pain and pressure on your leg when you walk  Ask your healthcare provider for more information about assistive devices and how to use them correctly  · Maintain a healthy weight  Extra body weight can cause pressure and pain in your hip, knee, and ankle joints  Ask your healthcare provider how much you should weigh  Ask him to help you create a weight loss plan if you are overweight  © 2017 [employer] Information is for End User's use only and may not be sold, redistributed or otherwise used for commercial purposes  All illustrations and images included in CareNotes® are the copyrighted property of [employer]  or [employer]  The above information is an  only  It is not intended as medical advice for individual conditions or treatments  Talk to your doctor, nurse or pharmacist before following any medical regimen to see if it is safe and effective for you  Gout   AMBULATORY CARE:   Gout  is a form of arthritis that causes severe joint pain and stiffness  Acute gout pain starts suddenly, gets worse quickly, and stops on its own  Acute gout can become chronic and cause permanent damage to your joints  Seek care immediately if:   · You have severe pain in one or more of your joints that you cannot tolerate  · You have a fever or redness that spreads beyond the joint area    Contact your healthcare provider if:   · You have new symptoms, such as a rash, after you start gout treatment  · Your joint pain and swelling do not go away, even after treatment  · You are not urinating as much or as often as you usually do  · You have trouble taking your gout medicines  · You have questions or concerns about your condition or care  Stages of gout:   · Hyperuricemia  starts with high levels of uric acid  Hyperuricemia is not gout, but it increases your risk for gout  You may have no symptoms at this stage, and it usually does not need treatment  · Acute gouty arthritis  starts with a sudden attack of pain and swelling, usually in 1 joint  The attack may last from a few days to 2 weeks  · Intercritical gout  is the time between attacks  You may go months or years without another attack  You will not have joint pain or stiffness, but this does not mean your gout is cured  You will still need treatment to prevent chronic gout  · Chronic tophaceous gout  develops if gout is not treated  Large amounts of uric acid crystals, called tophi, collect around your joints  The crystals can destroy or deform the joints  Gout attacks occur more often, and last hours to weeks  More than 1 joint may be painful and swollen  At this stage, gout symptoms do not go away on their own  Treatment  can make your symptoms stop sooner, prevent attacks, and decrease your risk of joint damage  You may need any of the following:  · Medicines:      ¨ NSAIDs , such as ibuprofen, help decrease swelling, pain, and fever  This medicine is available with or without a doctor's order  NSAIDs can cause stomach bleeding or kidney problems in certain people  If you take blood thinner medicine, always ask your healthcare provider if NSAIDs are safe for you  Always read the medicine label and follow directions  ¨ Gout medicine  decreases joint pain and swelling  It may also be given to prevent new gout attacks       ¨ Steroids  reduce inflammation and can help your joint stiffness and pain during gout attacks  ¨ Uric acid medicine  may be given to reduce the amount of uric acid your body makes  Some medicines may help you pass more uric acid when you urinate  ¨ Take your medicine as directed  Contact your healthcare provider if you think your medicine is not helping or if you have side effects  Tell him or her if you are allergic to any medicine  Keep a list of the medicines, vitamins, and herbs you take  Include the amounts, and when and why you take them  Bring the list or the pill bottles to follow-up visits  Carry your medicine list with you in case of an emergency  · Surgery  called a bone graft may be needed for tophi that are painful or infected  Bone in the joint may be replaced with bone taken from another place in your body  Ask your healthcare provider for more information about bone graft surgery  Manage gout:   · Rest your painful joint so it can heal   Your healthcare provider may recommend crutches or a walker if the affected joint is in a leg  · Apply ice to your joint  Ice decreases pain and swelling  Use an ice pack, or put crushed ice in a plastic bag  Cover the ice pack or bag with a towel before you apply it to your painful joint  Apply ice for 15 to 20 minutes every hour, or as directed  · Elevate your joint  Elevation helps reduce swelling and pain  Raise your joint above the level of your heart as often as you can  Prop your painful joint on pillows to keep it above your heart comfortably  · Go to physical therapy if directed  A physical therapist can teach you exercises to improve flexibility and range of motion  Prevent gout attacks:   · Do not eat high-purine foods  These foods include meats, seafood, asparagus, spinach, cauliflower, and some types of beans  Healthcare providers may tell you to eat more low-fat milk products, such as yogurt  Milk products may decrease your risk for gout attacks  Vitamin C and coffee may also help   Your healthcare provider or dietitian can help you create a meal plan  · Drink more liquids as directed  Liquids such as water help remove uric acid from your body  Ask how much liquid to drink each day and which liquids are best for you  · Manage your weight  Weight loss may decrease the amount of uric acid in your body  Exercise can help you lose weight  Talk to your healthcare provider about the best exercises for you  · Control your blood sugar level if you have diabetes  Keep your blood sugar level in a normal range  This can help prevent gout attacks  · Limit or do not drink alcohol as directed  Alcohol can trigger a gout attack  Alcohol also increases your risk for dehydration  Ask your healthcare provider if alcohol is safe for you  Follow up with your healthcare provider as directed:  Write down your questions so you remember to ask them during your visits  © 2017 [employer]  Information is for End User's use only and may not be sold, redistributed or otherwise used for commercial purposes  All illustrations and images included in CareNotes® are the copyrighted property of [employer]  or Sam James  The above information is an  only  It is not intended as medical advice for individual conditions or treatments  Talk to your doctor, nurse or pharmacist before following any medical regimen to see if it is safe and effective for you

## 2018-10-09 NOTE — ED PROVIDER NOTES
History  Chief Complaint   Patient presents with    Foot Swelling     Patient c/o left foot swelling and pain  Notes he has gout  Patient also c/o lower back pain  Symptoms worsened on Friday  HPI     78-year-old male pmhx CAD, COPD, hyperlipidemia, hypertension, gout presents for evaluation of left leg pain  Patient noticed erythema, swelling, tenderness of his left lower extremity about 5 days ago  He notes tenderness of his calf and shin  He also notes swelling began at his foot and ascended  Symptoms have progressively worsened and patient unable to bear weight on left leg due to pain  He notes pain is a 8 out of 10  He says pain feels similar to his prior episodes of gout  Admits to mild numbness of leg  Notes no improvement with gabapentin, Tylenol, and naproxen  Denies recent injury, immobilization, or surgery  Denies history of DVT/PE  Denies fever, chills, chest pain, shortness of breath, nausea, vomiting, abdominal pain, diarrhea, or dysuria  Prior to Admission Medications   Prescriptions Last Dose Informant Patient Reported? Taking? VENTOLIN  (90 Base) MCG/ACT inhaler  Self No No   Sig: INHALE 2 PUFFS EVERY 4-6 HOURS AS NEEDED     acetaminophen (TYLENOL) 325 mg tablet   No No   Sig: Take 3 tablets (975 mg total) by mouth 2 (two) times a day as needed for mild pain   albuterol (VENTOLIN HFA) 90 mcg/act inhaler   No No   Sig: Inhale 2 puffs every 6 (six) hours as needed for wheezing   allopurinol (ZYLOPRIM) 100 mg tablet   Yes No   Sig: Take 100 mg by mouth   allopurinol (ZYLOPRIM) 100 mg tablet   No No   Sig: Take 1 tablet (100 mg total) by mouth 3 (three) times a day   amLODIPine (NORVASC) 10 mg tablet   Yes No   Sig: TAKE 1 TABLET DAILY FOR BLOOD PRESSURE    amLODIPine (NORVASC) 10 mg tablet   No No   Sig: Take 1 tablet (10 mg total) by mouth daily For blood pressure   aspirin (ECOTRIN LOW STRENGTH) 81 mg EC tablet   Yes No   Sig: TAKE 1 TABLET BY MOUTH ONCE A DAY   atorvastatin (LIPITOR) 40 mg tablet   Yes No   Sig: TAKE ONE TABLET BY MOUTH AT BEDTIME DAILY   atorvastatin (LIPITOR) 40 mg tablet   No No   Sig: Take 1 tablet (40 mg total) by mouth daily before dinner   doxazosin (CARDURA) 4 mg tablet   Yes No   Sig: TAKE 1 TABLET BY MOUTH DAILY   doxazosin (CARDURA) 4 mg tablet   No No   Sig: Take 1 tablet (4 mg total) by mouth daily   gabapentin (NEURONTIN) 100 mg capsule   Yes No   Sig: Take 100 mg by mouth   gabapentin (NEURONTIN) 100 mg capsule   No No   Sig: Take 1 capsule (100 mg total) by mouth 3 (three) times a day   hydrochlorothiazide (HYDRODIURIL) 25 mg tablet   Yes No   Sig: Take 25 mg by mouth   hydrochlorothiazide (HYDRODIURIL) 25 mg tablet   No No   Sig: Take 1 tablet (25 mg total) by mouth daily   ipratropium (ATROVENT) 0 02 % nebulizer solution   Yes No   ipratropium (ATROVENT) 0 02 % nebulizer solution   No No   Sig: Take 1 vial (0 5 mg total) by nebulization 3 (three) times a day as needed for wheezing or shortness of breath for up to 30 days   loratadine (CLARITIN) 10 mg tablet   No No   Sig: Take 1 tablet (10 mg total) by mouth daily   losartan (COZAAR) 100 MG tablet   Yes No   Sig: TAKE 1 TABLET DAILY  losartan (COZAAR) 100 MG tablet   No No   Sig: Take 1 tablet (100 mg total) by mouth daily   mometasone-formoterol (DULERA) 100-5 MCG/ACT inhaler   Yes No   Sig: INHALE 2 PUFFS TWICE A DAY OR RINSE AFTER USE   mometasone-formoterol (DULERA) 100-5 MCG/ACT inhaler   No No   Sig: Inhale 1 puff 2 (two) times a day   predniSONE 10 mg tablet   No No   Si tabs via mouth once daily x 1 day, 3 tabs via mouth once daily x 3 days, 2 tabs via mouthonce daily x 3 days, 1 tab oral, once daily x 3 days and STOP        Facility-Administered Medications: None       Past Medical History:   Diagnosis Date    CAD (coronary artery disease)     COPD (chronic obstructive pulmonary disease) (HCC)     Gout     Hyperlipidemia     Hypertension     Orthopnea     last assessed: 16    Pericardial effusion     Sleep apnea        Past Surgical History:   Procedure Laterality Date    CARDIAC CATHETERIZATION         Family History   Problem Relation Age of Onset    Diabetes Father     Hypertension Mother      I have reviewed and agree with the history as documented  Social History   Substance Use Topics    Smoking status: Current Every Day Smoker     Packs/day: 0 20     Years: 46 00     Types: Cigarettes     Last attempt to quit: 6/30/2015    Smokeless tobacco: Never Used    Alcohol use No      Comment: quit 3 years ago  Review of Systems   Constitutional: Negative for chills, diaphoresis, fatigue and fever  HENT: Negative for congestion, rhinorrhea and sore throat  Eyes: Negative for photophobia and visual disturbance  Respiratory: Negative for cough, chest tightness and shortness of breath  Cardiovascular: Positive for leg swelling  Negative for chest pain and palpitations  Gastrointestinal: Negative for abdominal pain, blood in stool, constipation, diarrhea, nausea and vomiting  Genitourinary: Negative for dysuria, frequency and hematuria  Musculoskeletal: Positive for gait problem and myalgias  Negative for back pain, neck pain and neck stiffness  Skin: Positive for color change  Negative for pallor and rash  Neurological: Positive for numbness  Negative for weakness, light-headedness and headaches  Hematological: Negative for adenopathy  Does not bruise/bleed easily  All other systems reviewed and are negative        Physical Exam  ED Triage Vitals   Temperature Pulse Respirations Blood Pressure SpO2   10/09/18 1318 10/09/18 1318 10/09/18 1318 10/09/18 1318 10/09/18 1318   98 8 °F (37 1 °C) 91 22 148/80 93 %      Temp Source Heart Rate Source Patient Position - Orthostatic VS BP Location FiO2 (%)   10/09/18 1318 10/09/18 1512 10/09/18 1512 10/09/18 1318 --   Tympanic Monitor Lying Left arm       Pain Score       10/09/18 1318       Worst Possible Pain Orthostatic Vital Signs  Vitals:    10/09/18 1318 10/09/18 1512 10/09/18 1600 10/09/18 1645   BP: 148/80 157/88 (!) 184/99 162/86   Pulse: 91 66 68 64   Patient Position - Orthostatic VS:  Lying Sitting Sitting       Physical Exam   Constitutional: He is oriented to person, place, and time  No distress  Patient alert and oriented, appears well and non-toxic, in no acute distress    HENT:   Head: Normocephalic and atraumatic  Eyes: Pupils are equal, round, and reactive to light  Conjunctivae and EOM are normal    Neck: Normal range of motion  Neck supple  Cardiovascular: Normal rate, regular rhythm, normal heart sounds and intact distal pulses  Pulmonary/Chest: Effort normal and breath sounds normal  No respiratory distress  Abdominal: Soft  Bowel sounds are normal  He exhibits no distension  There is no tenderness  Musculoskeletal: Normal range of motion  He exhibits edema and tenderness  LLE erythema and edema from foot to below knee  Tenderness to palpation at calf and shin   Lymphadenopathy:     He has no cervical adenopathy  Neurological: He is alert and oriented to person, place, and time  No cranial nerve deficit or sensory deficit  Coordination normal    Skin: Skin is warm and dry  Capillary refill takes less than 2 seconds  No rash noted  He is not diaphoretic  There is erythema  No pallor  Psychiatric: He has a normal mood and affect  His behavior is normal  Judgment and thought content normal    Nursing note and vitals reviewed        ED Medications  Medications   ketorolac (TORADOL) injection 15 mg (15 mg Intravenous Given 10/9/18 1559)       Diagnostic Studies  Results Reviewed     Procedure Component Value Units Date/Time    Basic metabolic panel [[id number]] Collected:  10/09/18 1559    Lab Status:  Final result Specimen:  Blood from Arm, Right Updated:  10/09/18 1629     Sodium 138 mmol/L      Potassium 3 7 mmol/L      Chloride 104 mmol/L      CO2 30 mmol/L      ANION GAP 4 mmol/L      BUN 15 mg/dL      Creatinine 1 22 mg/dL      Glucose 89 mg/dL      Calcium 9 2 mg/dL      eGFR 64 ml/min/1 73sq m     Narrative:         National Kidney Disease Education Program recommendations are as follows:  GFR calculation is accurate only with a steady state creatinine  Chronic Kidney disease less than 60 ml/min/1 73 sq  meters  Kidney failure less than 15 ml/min/1 73 sq  meters      CBC and differential [[id number]] Collected:  10/09/18 1559    Lab Status:  Final result Specimen:  Blood from Arm, Right Updated:  10/09/18 1609     WBC 8 09 Thousand/uL      RBC 5 10 Million/uL      Hemoglobin 15 4 g/dL      Hematocrit 47 8 %      MCV 94 fL      MCH 30 2 pg      MCHC 32 2 g/dL      RDW 12 5 %      MPV 11 2 fL      Platelets 477 Thousands/uL      nRBC 0 /100 WBCs      Neutrophils Relative 64 %      Immat GRANS % 0 %      Lymphocytes Relative 20 %      Monocytes Relative 11 %      Eosinophils Relative 4 %      Basophils Relative 1 %      Neutrophils Absolute 5 27 Thousands/µL      Immature Grans Absolute 0 02 Thousand/uL      Lymphocytes Absolute 1 59 Thousands/µL      Monocytes Absolute 0 87 Thousand/µL      Eosinophils Absolute 0 28 Thousand/µL      Basophils Absolute 0 06 Thousands/µL                  VAS lower limb venous duplex study, unilateral/limited   Final Result by Nelsy Soto MD (10/09 1914)            Procedures  Procedures      Phone Consults  ED Phone Contact    ED Course                               MDM  Number of Diagnoses or Management Options  Left leg pain:   Left leg swelling:   Leg erythema:   Diagnosis management comments: Impression: 61yo male presents for evaluation of left leg pain  Ddx: DVT, cellulitis, gout  Plan: LLE duplex    CritCare Time    Disposition  Final diagnoses:   Left leg pain   Left leg swelling   Leg erythema - Left     Time reflects when diagnosis was documented in both MDM as applicable and the Disposition within this note     Time User Action Codes Description Comment    10/9/2018  4:32 PM María King Add [J84 755] Left leg pain     10/9/2018  4:32 PM María King Add [T92 57] Left leg swelling     10/9/2018  4:33 PM María King Add [L53 9] Leg erythema     10/9/2018  4:33 PM María King Modify [L53 9] Leg erythema Left      ED Disposition     ED Disposition Condition Comment    Discharge  Franc Hart discharge to home/self care      Condition at discharge: Stable        Follow-up Information     Follow up With Specialties Details Why Contact Info Svetlana Mariposa [address] [facility] Go in 3 days As needed, If symptoms worsen [facility] [id number]  [address] [place] [address], [address] [place], [place] [address] [place], [place], [facility] Emergency Department Emergency Medicine  If symptoms worsen [address]  [phone]  ED, [address] [place] [address], [phone]          Discharge Medication List as of 10/9/2018  5:01 PM      START taking these medications    Details   colchicine (COLCRYS) 0 6 mg tablet Take 1 tablet (0 6 mg total) by mouth daily for 7 days, Starting Tue 10/9/2018, Until Tue 10/16/2018, Print      morphine (MSIR) 15 mg tablet Take 1 tablet (15 mg total) by mouth every 4 (four) hours as needed for severe pain for up to 3 days Max Daily Amount: 90 mg, Starting Tue 10/9/2018, Until Fri 10/12/2018, Print      naproxen (NAPROSYN) 500 mg tablet Take 1 tablet (500 mg total) by mouth 2 (two) times a day with meals, Starting Tue 10/9/2018, Print         CONTINUE these medications which have NOT CHANGED    Details   acetaminophen (TYLENOL) 325 mg tablet Take 3 tablets (975 mg total) by mouth 2 (two) times a day as needed for mild pain, Starting Fri 3/30/2018, Normal      !! albuterol (VENTOLIN HFA) 90 mcg/act inhaler Inhale 2 puffs every 6 (six) hours as needed for wheezing, Starting Fri 9/7/2018, Normal      !! allopurinol (ZYLOPRIM) 100 mg tablet Take 100 mg by mouth, Starting Thu 2/1/2018, Historical Med      !! allopurinol (ZYLOPRIM) 100 mg tablet Take 1 tablet (100 mg total) by mouth 3 (three) times a day, Starting Fri 9/7/2018, Normal      !! amLODIPine (NORVASC) 10 mg tablet TAKE 1 TABLET DAILY FOR BLOOD PRESSURE , Historical Med      !! amLODIPine (NORVASC) 10 mg tablet Take 1 tablet (10 mg total) by mouth daily For blood pressure, Starting Fri 9/7/2018, Normal      aspirin (ECOTRIN LOW STRENGTH) 81 mg EC tablet TAKE 1 TABLET BY MOUTH ONCE A DAY, Historical Med      !! atorvastatin (LIPITOR) 40 mg tablet TAKE ONE TABLET BY MOUTH AT BEDTIME DAILY, Historical Med      !! atorvastatin (LIPITOR) 40 mg tablet Take 1 tablet (40 mg total) by mouth daily before dinner, Starting Fri 9/7/2018, Normal      !! doxazosin (CARDURA) 4 mg tablet TAKE 1 TABLET BY MOUTH DAILY, Historical Med      !! doxazosin (CARDURA) 4 mg tablet Take 1 tablet (4 mg total) by mouth daily, Starting Fri 9/7/2018, Normal      !! gabapentin (NEURONTIN) 100 mg capsule Take 100 mg by mouth, Starting Fri 3/30/2018, Historical Med      !! gabapentin (NEURONTIN) 100 mg capsule Take 1 capsule (100 mg total) by mouth 3 (three) times a day, Starting Fri 9/7/2018, Normal      !! hydrochlorothiazide (HYDRODIURIL) 25 mg tablet Take 25 mg by mouth, Starting Mon 4/16/2018, Historical Med      !! hydrochlorothiazide (HYDRODIURIL) 25 mg tablet Take 1 tablet (25 mg total) by mouth daily, Starting Fri 9/7/2018, Normal      ipratropium (ATROVENT) 0 02 % nebulizer solution Starting Fri 7/20/2018, Historical Med      loratadine (CLARITIN) 10 mg tablet Take 1 tablet (10 mg total) by mouth daily, Starting Thu 9/13/2018, Normal      !! losartan (COZAAR) 100 MG tablet TAKE 1 TABLET DAILY  , Historical Med      !! losartan (COZAAR) 100 MG tablet Take 1 tablet (100 mg total) by mouth daily, Starting Fri 9/7/2018, Normal      !! mometasone-formoterol (DULERA) 100-5 MCG/ACT inhaler INHALE 2 PUFFS TWICE A DAY OR RINSE AFTER USE, Historical Med      !! mometasone-formoterol (DULERA) 100-5 MCG/ACT inhaler Inhale 1 puff 2 (two) times a day, Starting Fri 9/7/2018, Normal      predniSONE 10 mg tablet 4 tabs via mouth once daily x 1 day, 3 tabs via mouth once daily x 3 days, 2 tabs via mouthonce daily x 3 days, 1 tab oral, once daily x 3 days and STOP , Print      !! VENTOLIN  (90 Base) MCG/ACT inhaler INHALE 2 PUFFS EVERY 4-6 HOURS AS NEEDED , Normal       !! - Potential duplicate medications found  Please discuss with provider  No discharge procedures on file  ED Provider  Attending physically available and evaluated Idalia Dillon I managed the patient along with the ED Attending      Electronically Signed by         Kelly Burnette MD  10/10/18 3929

## 2018-10-09 NOTE — ED ATTENDING ATTESTATION
Jamie Benz MD, saw and evaluated the patient  All available labs and X-rays were ordered by me or the resident and have been reviewed by myself  I discussed the patient with the resident / non-physician and agree with the resident's / non-physician practitioner's findings and plan as documented in the resident's / non-physician practicitioner's note, except where noted  At this point, I agree with the current assessment done in the ED  Chief Complaint   Patient presents with    Foot Swelling     Patient c/o left foot swelling and pain  Notes he has gout  Patient also c/o lower back pain  Symptoms worsened on Friday  This is a 78-year-old male presenting for evaluation of foot swelling  The patient states that since Friday he has been having left lower extremity foot and ankle swelling, redness  It feels like his gout that he has had in the past although normally he was get it only in his knees  He has had previous multiple joint aspiration with crystals demonstrated per patient  He denies any fevers chills nausea vomiting  He is able to walk on it but it is extremely painful  He is able to range it no recent orthopedic procedures or injections in the joints  He has been using Naprosyn and gabapentin like he normally does to help his pain  He has been compliant with all doses of allopurinol  Despite this his symptoms have started    Denies fevers chills nausea vomiting chest pain shortness of breath dizziness lightheadedness  PMH:  - Sleep apnea  - HTN  - COPD  - Pericardial effusion  - CAD  - HLD  - Gout  PSH:  - Catherization  +smoking  No alcohol/drugs  PE:  Vitals:    10/09/18 1318 10/09/18 1512 10/09/18 1600 10/09/18 1645   BP: 148/80 157/88 (!) 184/99 162/86   BP Location: Left arm Right arm Right arm Right arm   Pulse: 91 66 68 64   Resp: 22 18 18 16   Temp: 98 8 °F (37 1 °C)      TempSrc: Tympanic      SpO2: 93% 93% 94% 95%   Weight: 104 kg (230 lb)      Height: 5' 8" ( 727 m)      General: VSS, NAD, awake, alert  Well-nourished, well-developed  Appears stated age  Speaking normally in full sentences  Head: Normocephalic, atraumatic, nontender  Eyes: PERRL, EOM-I  No diplopia  No hyphema  No subconjunctival hemorrhages  Symmetrical lids  ENT: Atraumatic external nose and ears  MMM  No malocclusion  No stridor  Normal phonation  No drooling  Normal swallowing  Neck: Symmetric, trachea midline  No JVD  CV: RRR  +S1/S2  No murmurs or gallops  Peripheral pulses +2 throughout  No chest wall tenderness  Lungs:   Unlabored No retractions  CTAB, lungs sounds equal bilateral    No tachypnea  Abd: +BS, soft, NT/ND    MSK:   EHL/FHL/PF/DF/KF/KE/HF/HE limited by pain but intact  His sensation is intact  There is no warmth of the skin  There is pitting edema noted  It is very tender to the touch but not hyper-algesic  There is pitting edema around the ankle  I was able to feel landmarks though  2+ DPs + PTs  There is marked tendernses of the 1st and 2nd toe bases as well  No skin breaks  Back:   No rashes  Skin: Dry, intact  Neuro: AAOx3, GCS 15, CN II-XII grossly intact  Motor grossly intact  Psychiatric/Behavioral: Appropriate mood and affect   Exam: deferred  A:  - Foot swelling/ankle swelling with redness  P:  - concern for gout based on hx  - discussed aspiration of joint; patient refused  Patient says this feels like gout and wants to just do gout treatment as his home medications normally used are not functioning   - will do duplex r/o  - RTER precautions  - NSAIDs vs steroids vs colchicine  - 13 point ROS was performed and all are normal unless stated in the history above  - Nursing note reviewed  Vitals reviewed  - Orders placed by myself and/or advanced practitioner / resident     - Previous chart was reviewed  - No language barrier    - History obtained from patient  - There are no limitations to the history obtained     - Critical care time: Not applicable for this patient  Final Diagnosis:  1  Left leg pain    2  Left leg swelling    3   Leg erythema           Medications   ketorolac (TORADOL) injection 15 mg (15 mg Intravenous Given 10/9/18 4154)     VAS lower limb venous duplex study, unilateral/limited   Final Result        Orders Placed This Encounter   Procedures    CBC and differential    Basic metabolic panel     Labs Reviewed   CBC AND DIFFERENTIAL       Result Value Ref Range Status    WBC 8 09  4 31 - 10 16 Thousand/uL Final    RBC 5 10  3 88 - 5 62 Million/uL Final    Hemoglobin 15 4  12 0 - 17 0 g/dL Final    Hematocrit 47 8  36 5 - 49 3 % Final    MCV 94  82 - 98 fL Final    MCH 30 2  26 8 - 34 3 pg Final    MCHC 32 2  31 4 - 37 4 g/dL Final    RDW 12 5  11 6 - 15 1 % Final    MPV 11 2  8 9 - 12 7 fL Final    Platelets 120  585 - 390 Thousands/uL Final    nRBC 0  /100 WBCs Final    Neutrophils Relative 64  43 - 75 % Final    Immat GRANS % 0  0 - 2 % Final    Lymphocytes Relative 20  14 - 44 % Final    Monocytes Relative 11  4 - 12 % Final    Eosinophils Relative 4  0 - 6 % Final    Basophils Relative 1  0 - 1 % Final    Neutrophils Absolute 5 27  1 85 - 7 62 Thousands/µL Final    Immature Grans Absolute 0 02  0 00 - 0 20 Thousand/uL Final    Lymphocytes Absolute 1 59  0 60 - 4 47 Thousands/µL Final    Monocytes Absolute 0 87  0 17 - 1 22 Thousand/µL Final    Eosinophils Absolute 0 28  0 00 - 0 61 Thousand/µL Final    Basophils Absolute 0 06  0 00 - 0 10 Thousands/µL Final   BASIC METABOLIC PANEL    Sodium 576  136 - 145 mmol/L Final    Potassium 3 7  3 5 - 5 3 mmol/L Final    Chloride 104  100 - 108 mmol/L Final    CO2 30  21 - 32 mmol/L Final    ANION GAP 4  4 - 13 mmol/L Final    BUN 15  5 - 25 mg/dL Final    Creatinine 1 22  0 60 - 1 30 mg/dL Final    Comment: Standardized to IDMS reference method    Glucose 89  65 - 140 mg/dL Final    Comment:   If the patient is fasting, the ADA then defines impaired fasting glucose as > 100 mg/dL and diabetes as > or equal to 123 mg/dL  Specimen collection should occur prior to Sulfasalazine administration due to the potential for falsely depressed results  Specimen collection should occur prior to Sulfapyridine administration due to the potential for falsely elevated results  Calcium 9 2  8 3 - 10 1 mg/dL Final    eGFR 64  ml/min/1 73sq m Final    Narrative:     National Kidney Disease Education Program recommendations are as follows:  GFR calculation is accurate only with a steady state creatinine  Chronic Kidney disease less than 60 ml/min/1 73 sq  meters  Kidney failure less than 15 ml/min/1 73 sq  meters  Time reflects when diagnosis was documented in both MDM as applicable and the Disposition within this note     Time User Action Codes Description Comment    10/9/2018  4:32 PM Gayle Benítez Add [V82 434] Left leg pain     10/9/2018  4:32 PM Nuris Caul [M79 89] Left leg swelling     10/9/2018  4:33 PM Gaylesuzanne Benítez Add [L53 9] Leg erythema     10/9/2018  4:33 PM Gayle Benítez Modify [L53 9] Leg erythema Left      ED Disposition     ED Disposition Condition Comment    Discharge  Idalia Dillon discharge to home/self care      Condition at discharge: Stable        Follow-up Information     Follow up With Specialties Details Why Contact Info Additional [place] [place] [address] [facility] Go in 3 days As needed, If symptoms worsen [address] [facility] [id number]  [address] [place] [address], [address] Emergency Department Emergency Medicine  If symptoms worsen [address] [facility]  [phone]  ED, [address] [place] [address], [id number]        Discharge Medication List as of 10/9/2018  5:01 PM      START taking these medications    Details   colchicine (COLCRYS) 0 6 mg tablet Take 1 tablet (0 6 mg total) by mouth daily for 7 days, Starting Tue 10/9/2018, Until Tue 10/16/2018, Print      morphine (MSIR) 15 mg tablet Take 1 tablet (15 mg total) by mouth every 4 (four) hours as needed for severe pain for up to 3 days Max Daily Amount: 90 mg, Starting Tue 10/9/2018, Until Fri 10/12/2018, Print      naproxen (NAPROSYN) 500 mg tablet Take 1 tablet (500 mg total) by mouth 2 (two) times a day with meals, Starting Tue 10/9/2018, Print         CONTINUE these medications which have NOT CHANGED    Details   !! VENTOLIN  (90 Base) MCG/ACT inhaler INHALE 2 PUFFS EVERY 4-6 HOURS AS NEEDED , Normal      acetaminophen (TYLENOL) 325 mg tablet Take 3 tablets (975 mg total) by mouth 2 (two) times a day as needed for mild pain, Starting Fri 3/30/2018, Normal      !! albuterol (VENTOLIN HFA) 90 mcg/act inhaler Inhale 2 puffs every 6 (six) hours as needed for wheezing, Starting Fri 9/7/2018, Normal      !! allopurinol (ZYLOPRIM) 100 mg tablet Take 100 mg by mouth, Starting Thu 2/1/2018, Historical Med      !! allopurinol (ZYLOPRIM) 100 mg tablet Take 1 tablet (100 mg total) by mouth 3 (three) times a day, Starting Fri 9/7/2018, Normal      !! amLODIPine (NORVASC) 10 mg tablet TAKE 1 TABLET DAILY FOR BLOOD PRESSURE , Historical Med      !! amLODIPine (NORVASC) 10 mg tablet Take 1 tablet (10 mg total) by mouth daily For blood pressure, Starting Fri 9/7/2018, Normal      aspirin (ECOTRIN LOW STRENGTH) 81 mg EC tablet TAKE 1 TABLET BY MOUTH ONCE A DAY, Historical Med      !! atorvastatin (LIPITOR) 40 mg tablet TAKE ONE TABLET BY MOUTH AT BEDTIME DAILY, Historical Med      !! atorvastatin (LIPITOR) 40 mg tablet Take 1 tablet (40 mg total) by mouth daily before dinner, Starting Fri 9/7/2018, Normal      !! doxazosin (CARDURA) 4 mg tablet TAKE 1 TABLET BY MOUTH DAILY, Historical Med      !! doxazosin (CARDURA) 4 mg tablet Take 1 tablet (4 mg total) by mouth daily, Starting Fri 9/7/2018, Normal      !! gabapentin (NEURONTIN) 100 mg capsule Take 100 mg by mouth, Starting Fri 3/30/2018, Historical Med      !! gabapentin (NEURONTIN) 100 mg capsule Take 1 capsule (100 mg total) by mouth 3 (three) times a day, Starting Fri 9/7/2018, Normal      !! hydrochlorothiazide (HYDRODIURIL) 25 mg tablet Take 25 mg by mouth, Starting Mon 4/16/2018, Historical Med      !! hydrochlorothiazide (HYDRODIURIL) 25 mg tablet Take 1 tablet (25 mg total) by mouth daily, Starting Fri 9/7/2018, Normal      ipratropium (ATROVENT) 0 02 % nebulizer solution Starting Fri 7/20/2018, Historical Med      loratadine (CLARITIN) 10 mg tablet Take 1 tablet (10 mg total) by mouth daily, Starting Thu 9/13/2018, Normal      !! losartan (COZAAR) 100 MG tablet TAKE 1 TABLET DAILY  , Historical Med      !! losartan (COZAAR) 100 MG tablet Take 1 tablet (100 mg total) by mouth daily, Starting Fri 9/7/2018, Normal      !! mometasone-formoterol (DULERA) 100-5 MCG/ACT inhaler INHALE 2 PUFFS TWICE A DAY OR RINSE AFTER USE, Historical Med      !! mometasone-formoterol (DULERA) 100-5 MCG/ACT inhaler Inhale 1 puff 2 (two) times a day, Starting Fri 9/7/2018, Normal      predniSONE 10 mg tablet 4 tabs via mouth once daily x 1 day, 3 tabs via mouth once daily x 3 days, 2 tabs via mouthonce daily x 3 days, 1 tab oral, once daily x 3 days and STOP , Print       !! - Potential duplicate medications found  Please discuss with provider  No discharge procedures on file  Prior to Admission Medications   Prescriptions Last Dose Informant Patient Reported? Taking? VENTOLIN  (90 Base) MCG/ACT inhaler  Self No No   Sig: INHALE 2 PUFFS EVERY 4-6 HOURS AS NEEDED     acetaminophen (TYLENOL) 325 mg tablet   No No   Sig: Take 3 tablets (975 mg total) by mouth 2 (two) times a day as needed for mild pain   albuterol (VENTOLIN HFA) 90 mcg/act inhaler   No No   Sig: Inhale 2 puffs every 6 (six) hours as needed for wheezing   allopurinol (ZYLOPRIM) 100 mg tablet   Yes No   Sig: Take 100 mg by mouth   allopurinol (ZYLOPRIM) 100 mg tablet   No No   Sig: Take 1 tablet (100 mg total) by mouth 3 (three) times a day   amLODIPine (NORVASC) 10 mg tablet   Yes No   Sig: TAKE 1 TABLET DAILY FOR BLOOD PRESSURE    amLODIPine (NORVASC) 10 mg tablet   No No   Sig: Take 1 tablet (10 mg total) by mouth daily For blood pressure   aspirin (ECOTRIN LOW STRENGTH) 81 mg EC tablet   Yes No   Sig: TAKE 1 TABLET BY MOUTH ONCE A DAY   atorvastatin (LIPITOR) 40 mg tablet   Yes No   Sig: TAKE ONE TABLET BY MOUTH AT BEDTIME DAILY   atorvastatin (LIPITOR) 40 mg tablet   No No   Sig: Take 1 tablet (40 mg total) by mouth daily before dinner   doxazosin (CARDURA) 4 mg tablet   Yes No   Sig: TAKE 1 TABLET BY MOUTH DAILY   doxazosin (CARDURA) 4 mg tablet   No No   Sig: Take 1 tablet (4 mg total) by mouth daily   gabapentin (NEURONTIN) 100 mg capsule   Yes No   Sig: Take 100 mg by mouth   gabapentin (NEURONTIN) 100 mg capsule   No No   Sig: Take 1 capsule (100 mg total) by mouth 3 (three) times a day   hydrochlorothiazide (HYDRODIURIL) 25 mg tablet   Yes No   Sig: Take 25 mg by mouth   hydrochlorothiazide (HYDRODIURIL) 25 mg tablet   No No   Sig: Take 1 tablet (25 mg total) by mouth daily   ipratropium (ATROVENT) 0 02 % nebulizer solution   Yes No   ipratropium (ATROVENT) 0 02 % nebulizer solution   No No   Sig: Take 1 vial (0 5 mg total) by nebulization 3 (three) times a day as needed for wheezing or shortness of breath for up to 30 days   loratadine (CLARITIN) 10 mg tablet   No No   Sig: Take 1 tablet (10 mg total) by mouth daily   losartan (COZAAR) 100 MG tablet   Yes No   Sig: TAKE 1 TABLET DAILY     losartan (COZAAR) 100 MG tablet   No No   Sig: Take 1 tablet (100 mg total) by mouth daily   mometasone-formoterol (DULERA) 100-5 MCG/ACT inhaler   Yes No   Sig: INHALE 2 PUFFS TWICE A DAY OR RINSE AFTER USE   mometasone-formoterol (DULERA) 100-5 MCG/ACT inhaler   No No   Sig: Inhale 1 puff 2 (two) times a day   predniSONE 10 mg tablet   No No   Si tabs via mouth once daily x 1 day, 3 tabs via mouth once daily x 3 days, 2 tabs via mouthonce daily x 3 days, 1 tab oral, once daily x 3 days and STOP  Facility-Administered Medications: None       Portions of the record may have been created with voice recognition software  Occasional wrong word or "sound a like" substitutions may have occurred due to the inherent limitations of voice recognition software  Read the chart carefully and recognize, using context, where substitutions have occurred      Electronically signed by:  Ismael Pugh

## 2018-10-16 ENCOUNTER — TELEPHONE (OUTPATIENT)
Dept: INTERNAL MEDICINE CLINIC | Facility: CLINIC | Age: 58
End: 2018-10-16

## 2018-10-16 NOTE — TELEPHONE ENCOUNTER
Patient was prescribed 14 tablets of NAPROXEN 500 MG at the ER for leg pain on 10/09 , he called into [facility] requesting refills on this medication   Is it appropriate to provide refills or would you rather see him for a visit first ?    Also just an fyi, I realized within patients chart that he has an upcoming appt with [facility] on 10/23/2018 and I also see that he has seen them in the past on 04/18/2018, I spoke with patient to verify if he will be receiving care from them and he confirmed that he will NOT be seeing them , he only say them 1 time but decided to continue care with us   Just in case you noticed that   He is canceling that appt on 10/23 with them

## 2018-10-22 DIAGNOSIS — M79.605 LEFT LEG PAIN: ICD-10-CM

## 2018-10-22 RX ORDER — NAPROXEN 500 MG/1
500 TABLET ORAL 2 TIMES DAILY WITH MEALS
Qty: 14 TABLET | Refills: 0 | Status: SHIPPED | OUTPATIENT
Start: 2018-10-22 | End: 2018-11-08 | Stop reason: SDUPTHER

## 2018-10-22 NOTE — PROGRESS NOTES
Received message regarding patient requesting refill of Naproxen  Was seen in ED on 10/9 for worsening leg pain and swelling  Lower extremity duplex negative for acute DVT  Known history of gout  Treated for suspected acute gout flare  Renal function stable  Spoke to patient over phone  Reports pain has improved but still has swelling and mild discomfort  Instructed patient to continue allopurinol  Will reorder Naproxen 500 mg BID with meals for 7 days  Advised leg elevation at rest  Messaged clinical staff to have patient schedule acute visit at clinic this week if slot available for evaluation of suspected acute gout flare  Will continue HCTZ for now as patient reports that his BP is high at home, although he has not checked recently  Recommend checking uric acid level and discontinuing HCTZ if BP is well controlled at time of acute visit

## 2018-10-22 NOTE — TELEPHONE ENCOUNTER
We can send him refill for the Naproxen, but he should also be seen for possible gout flare  Please see if we can get him an acute visit appointment this week so that he can be evaluated  I will send for refill of Naproxen for one week  Thanks

## 2018-10-31 RX ORDER — CHOLECALCIFEROL (VITAMIN D3) 50 MCG
CAPSULE ORAL
Refills: 2 | COMMUNITY
Start: 2018-09-13 | End: 2019-07-08 | Stop reason: SDUPTHER

## 2018-10-31 RX ORDER — CHOLECALCIFEROL (VITAMIN D3) 50 MCG
CAPSULE ORAL
COMMUNITY
Start: 2018-09-13

## 2018-11-01 ENCOUNTER — OFFICE VISIT (OUTPATIENT)
Dept: INTERNAL MEDICINE CLINIC | Facility: CLINIC | Age: 58
End: 2018-11-01
Payer: COMMERCIAL

## 2018-11-01 VITALS
HEIGHT: 68 IN | DIASTOLIC BLOOD PRESSURE: 74 MMHG | BODY MASS INDEX: 39.63 KG/M2 | SYSTOLIC BLOOD PRESSURE: 116 MMHG | TEMPERATURE: 98 F | WEIGHT: 261.47 LBS | HEART RATE: 68 BPM

## 2018-11-01 DIAGNOSIS — R60.0 LOCALIZED EDEMA: Primary | ICD-10-CM

## 2018-11-01 DIAGNOSIS — R60.0 EDEMA OF BOTH LOWER EXTREMITIES: ICD-10-CM

## 2018-11-01 DIAGNOSIS — I10 ESSENTIAL HYPERTENSION: Chronic | ICD-10-CM

## 2018-11-01 DIAGNOSIS — I10 HYPERTENSION, UNSPECIFIED TYPE: ICD-10-CM

## 2018-11-01 DIAGNOSIS — M1A.9XX0 CHRONIC GOUT WITHOUT TOPHUS, UNSPECIFIED CAUSE, UNSPECIFIED SITE: ICD-10-CM

## 2018-11-01 DIAGNOSIS — M10.9 GOUTY ARTHRITIS: ICD-10-CM

## 2018-11-01 DIAGNOSIS — J44.9 COPD (CHRONIC OBSTRUCTIVE PULMONARY DISEASE) (HCC): ICD-10-CM

## 2018-11-01 DIAGNOSIS — J44.9 CHRONIC OBSTRUCTIVE PULMONARY DISEASE, UNSPECIFIED COPD TYPE (HCC): ICD-10-CM

## 2018-11-01 PROCEDURE — 3008F BODY MASS INDEX DOCD: CPT | Performed by: HOSPITALIST

## 2018-11-01 PROCEDURE — 3074F SYST BP LT 130 MM HG: CPT | Performed by: HOSPITALIST

## 2018-11-01 PROCEDURE — 1036F TOBACCO NON-USER: CPT | Performed by: HOSPITALIST

## 2018-11-01 PROCEDURE — 99213 OFFICE O/P EST LOW 20 MIN: CPT | Performed by: HOSPITALIST

## 2018-11-01 PROCEDURE — 3078F DIAST BP <80 MM HG: CPT | Performed by: HOSPITALIST

## 2018-11-01 RX ORDER — ALBUTEROL SULFATE 90 UG/1
2 AEROSOL, METERED RESPIRATORY (INHALATION) EVERY 6 HOURS PRN
Qty: 8 G | Refills: 5 | Status: SHIPPED | OUTPATIENT
Start: 2018-11-01 | End: 2019-05-22 | Stop reason: SDUPTHER

## 2018-11-01 RX ORDER — ASPIRIN 81 MG/1
81 TABLET ORAL DAILY
Qty: 90 TABLET | Refills: 2 | Status: SHIPPED | OUTPATIENT
Start: 2018-11-01 | End: 2019-08-22 | Stop reason: SDUPTHER

## 2018-11-01 RX ORDER — AMLODIPINE BESYLATE 10 MG/1
10 TABLET ORAL DAILY
Qty: 90 TABLET | Refills: 2 | Status: SHIPPED | OUTPATIENT
Start: 2018-11-01 | End: 2020-01-28 | Stop reason: SDUPTHER

## 2018-11-01 RX ORDER — ALLOPURINOL 100 MG/1
100 TABLET ORAL 3 TIMES DAILY
Qty: 180 TABLET | Refills: 3 | Status: SHIPPED | OUTPATIENT
Start: 2018-11-01 | End: 2021-02-09 | Stop reason: ALTCHOICE

## 2018-11-01 RX ORDER — GABAPENTIN 100 MG/1
100 CAPSULE ORAL 3 TIMES DAILY
Qty: 180 CAPSULE | Refills: 4 | Status: SHIPPED | OUTPATIENT
Start: 2018-11-01 | End: 2021-02-09 | Stop reason: ALTCHOICE

## 2018-11-01 RX ORDER — FUROSEMIDE 40 MG/1
40 TABLET ORAL DAILY
Qty: 14 TABLET | Refills: 0 | Status: SHIPPED | OUTPATIENT
Start: 2018-11-01 | End: 2021-02-09 | Stop reason: ALTCHOICE

## 2018-11-01 RX ORDER — DOXAZOSIN MESYLATE 4 MG/1
4 TABLET ORAL DAILY
Qty: 90 TABLET | Refills: 2 | Status: SHIPPED | OUTPATIENT
Start: 2018-11-01 | End: 2020-01-28 | Stop reason: SDUPTHER

## 2018-11-01 RX ORDER — LOSARTAN POTASSIUM 100 MG/1
100 TABLET ORAL DAILY
Qty: 90 TABLET | Refills: 2 | Status: SHIPPED | OUTPATIENT
Start: 2018-11-01 | End: 2020-01-28 | Stop reason: SDUPTHER

## 2018-11-01 NOTE — PROGRESS NOTES
ESTABLISHMENT OF CARE VISIT    ASSESSMENT/PLAN:  Diagnoses and all orders for this visit:    Edema of both lower extremities  · Has been ongoing for the past month 3+ pitting edema left worse than right  Left with erythema, no venous stasis changes, nontender to palpation, not warm to touch  · Patient had CBC that revealed no leukocytosis  He has remained clinically nontoxic and afebrile  · Unclear etiology  · No signs of JVD; no hepatojugular reflux  Last echocardiogram revealed LVEF 65% with Grade 1 diastolic dysfunction, no tricuspid regurg  · No known Peripheral arterial disease although given CAD and smoking history not unreasonable to consider  Pulses palpable    No venous stasis changes  · DVT ruled out with VAS lower limb duplex study in emergency department with negative study for DVT; so acute DVT unlikely  · Unlikely gout flare  Unlikely cellulitis    · May be lymphedema  Will trial furosemide for a few days  Will prescribe compression stockings  · Patient also on amlodipine chronically; can consider discontinuing if no improvement  · Patient to return to clinic in 2 weeks for re-evaluation  -     furosemide (LASIX) 40 mg tablet; Take 1 tablet (40 mg total) by mouth daily  -     Compression Stocking    Chronic gout without tophus, unspecified cause, unspecified site  · Not an acute gout attack  · Will discontinue patient's HCTZ  · Continue Allopurinol     Chronic obstructive pulmonary disease, unspecified COPD type ([facility] [address] )  · Patient has quit smoking for the past 2 months  · Not on oxygen at baseline  · Well controlled on current regimen  · Follows with Pulmonology       Schedule a follow-up appointment in 2 weeks    HISTORY OF PRESENTING ILLNESS:  Classie Severs is a 59 y o  with PMH significant for gouty arthritis, COPD, obstructive sleep apnea on CPAP, essential hypertension, hyperlipidemia, coronary artery disease status post coronary angioplasty, and obesity   Classie Severs is in clinic today for one month history of persistent her left lower extremity edema  Approximately one month ago patient presented to the emergency department for acute erythema and swelling in the left lower extremity  At that time patient had pain and believes he was having acute gout flare-up  Patient normally has gout flare-ups and he is bilateral knees and bilateral toes however this is the 1st time his entire ankle had swollen up  Patient was evaluated in the emergency department  Workup was negative for DVT or cellulitis  Patient was treated for acute gout flare-up with colchicine, morphine, and naproxen  Patient had improvement in his pain however he did not have any improvement in the erythema and swelling of the left lower extremity  Patient also has right lower extremity edema with no erythema  Patient denies any recent travel  Patient believes he may have had a bug bite as he leaves issues outside  He has remained afebrile, denies any rash on elsewhere on his body  He denies chills, night sweats, headache, blurry vision, chest pain, abdominal pain, nausea, vomiting, or difficulty with ambulation  Review of Systems   Constitutional: Negative for activity change, appetite change, fatigue, fever and unexpected weight change  HENT: Negative for ear discharge and sore throat  Eyes: Negative for visual disturbance  Respiratory: Positive for cough, shortness of breath and wheezing  Negative for apnea, choking, chest tightness and stridor  Cardiovascular: Positive for leg swelling  Negative for chest pain and palpitations  Gastrointestinal: Negative for abdominal distention, constipation, diarrhea and vomiting  Endocrine: Negative for polyphagia and polyuria  Genitourinary: Negative for difficulty urinating and dysuria  Musculoskeletal: Positive for joint swelling  Negative for arthralgias, back pain and gait problem  Skin: Positive for color change  Negative for pallor     Neurological: Negative for dizziness, seizures, speech difficulty, weakness, light-headedness, numbness and headaches  Hematological: Negative for adenopathy  Does not bruise/bleed easily  Psychiatric/Behavioral: Negative for agitation and behavioral problems  OBJECTIVE:  Vitals:    11/01/18 1511   BP: 116/74   Pulse: 68   Temp: 98 °F (36 7 °C)   Weight: 119 kg (261 lb 7 5 oz)   Height: 5' 8" (1 727 m)     Physical Exam   Constitutional: He appears well-developed and well-nourished  No distress  HENT:   Head: Normocephalic and atraumatic  Mouth/Throat: No oropharyngeal exudate  Eyes: Pupils are equal, round, and reactive to light  Conjunctivae are normal  No scleral icterus  Neck: Normal range of motion  No JVD present  Cardiovascular: Normal rate, regular rhythm, normal heart sounds and intact distal pulses  Exam reveals no gallop and no friction rub  No murmur heard  Pulmonary/Chest: Effort normal and breath sounds normal  No respiratory distress  He has no wheezes  He has no rales  Abdominal: Soft  Bowel sounds are normal  He exhibits no distension (obese abdomen) and no mass  There is no tenderness  There is no guarding  Musculoskeletal: He exhibits edema (3+ bilateral lower extremity edema pitting L>R)  He exhibits no tenderness  Neurological: He is alert  Skin: Skin is warm  He is not diaphoretic  There is erythema (LLE)  LLE no warmth to touch, no tenderness to palpation   Psychiatric: He has a normal mood and affect   His behavior is normal                  Current Outpatient Prescriptions:     albuterol (VENTOLIN HFA) 90 mcg/act inhaler, Inhale 2 puffs every 6 (six) hours as needed for wheezing, Disp: 8 g, Rfl: 5    allopurinol (ZYLOPRIM) 100 mg tablet, Take 100 mg by mouth, Disp: , Rfl:     amLODIPine (NORVASC) 10 mg tablet, Take 1 tablet (10 mg total) by mouth daily For blood pressure, Disp: 90 tablet, Rfl: 1    aspirin (ECOTRIN LOW STRENGTH) 81 mg EC tablet, TAKE 1 TABLET BY MOUTH ONCE A DAY, Disp: , Rfl:     [employer] ALLERGY RELIEF 10 MG tablet, TAKE 1 TABLET BY MOUTH EVERY DAY, Disp: , Rfl: 2    doxazosin (CARDURA) 4 mg tablet, Take 1 tablet (4 mg total) by mouth daily, Disp: 90 tablet, Rfl: 1    gabapentin (NEURONTIN) 100 mg capsule, Take 1 capsule (100 mg total) by mouth 3 (three) times a day, Disp: 90 capsule, Rfl: 2    ipratropium (ATROVENT) 0 02 % nebulizer solution, , Disp: , Rfl:     losartan (COZAAR) 100 MG tablet, Take 1 tablet (100 mg total) by mouth daily, Disp: 90 tablet, Rfl: 1    mometasone-formoterol (DULERA) 100-5 MCG/ACT inhaler, INHALE 2 PUFFS TWICE A DAY OR RINSE AFTER USE, Disp: , Rfl:     naproxen (NAPROSYN) 500 mg tablet, Take 1 tablet (500 mg total) by mouth 2 (two) times a day with meals, Disp: 14 tablet, Rfl: 0    VENTOLIN  (90 Base) MCG/ACT inhaler, INHALE 2 PUFFS EVERY 4-6 HOURS AS NEEDED , Disp: 18 Inhaler, Rfl: 0    acetaminophen (TYLENOL) 325 mg tablet, Take 3 tablets (975 mg total) by mouth 2 (two) times a day as needed for mild pain (Patient not taking: Reported on 11/1/2018 ), Disp: 180 tablet, Rfl: 3    allopurinol (ZYLOPRIM) 100 mg tablet, Take 1 tablet (100 mg total) by mouth 3 (three) times a day (Patient not taking: Reported on 11/1/2018 ), Disp: 90 tablet, Rfl: 2    amLODIPine (NORVASC) 10 mg tablet, TAKE 1 TABLET DAILY FOR BLOOD PRESSURE , Disp: , Rfl:     atorvastatin (LIPITOR) 40 mg tablet, TAKE ONE TABLET BY MOUTH AT BEDTIME DAILY, Disp: , Rfl:     atorvastatin (LIPITOR) 40 mg tablet, Take 1 tablet (40 mg total) by mouth daily before dinner, Disp: 90 tablet, Rfl: 1    colchicine (COLCRYS) 0 6 mg tablet, Take 1 tablet (0 6 mg total) by mouth daily for 7 days, Disp: 7 tablet, Rfl: 0    CVS ALLERGY RELIEF 10 MG tablet, , Disp: , Rfl:     doxazosin (CARDURA) 4 mg tablet, TAKE 1 TABLET BY MOUTH DAILY, Disp: , Rfl:     furosemide (LASIX) 40 mg tablet, Take 1 tablet (40 mg total) by mouth daily, Disp: 14 tablet, Rfl: 0   gabapentin (NEURONTIN) 100 mg capsule, Take 100 mg by mouth, Disp: , Rfl:     ipratropium (ATROVENT) 0 02 % nebulizer solution, Take 1 vial (0 5 mg total) by nebulization 3 (three) times a day as needed for wheezing or shortness of breath for up to 30 days, Disp: 30 vial, Rfl: 3    loratadine (CLARITIN) 10 mg tablet, Take 1 tablet (10 mg total) by mouth daily (Patient not taking: Reported on 11/1/2018 ), Disp: 30 tablet, Rfl: 2    losartan (COZAAR) 100 MG tablet, TAKE 1 TABLET DAILY  , Disp: , Rfl:     mometasone-formoterol (DULERA) 100-5 MCG/ACT inhaler, Inhale 1 puff 2 (two) times a day (Patient not taking: Reported on 11/1/2018 ), Disp: 1 Inhaler, Rfl: 2    Past Medical History:   Diagnosis Date    CAD (coronary artery disease)     COPD (chronic obstructive pulmonary disease) (HCC)     Gout     Hyperlipidemia     Hypertension     Orthopnea     last assessed: 8/17/16    Pericardial effusion     Sleep apnea      Past Surgical History:   Procedure Laterality Date    CARDIAC CATHETERIZATION       Social History     Social History    Marital status: Single     Spouse name: N/A    Number of children: 3    Years of education: N/A     Occupational History    Unemployed, not looking for work      Social History Main Topics    Smoking status: Former Smoker     Packs/day: 0 20     Years: 46 00     Types: Cigarettes     Quit date: 6/30/2015    Smokeless tobacco: Never Used    Alcohol use No      Comment: quit 3 years ago   Drug use: No    Sexual activity: No      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[employer]  Other Topics Concern    Not on file     Social History Narrative     as per [employer]         Family History   Problem Relation Age of Onset    Diabetes Father     Hypertension Mother        ==  Erika Robledo MD   Internal Medicine PGY-2  11/1/2018 4:19 PM    [facility]  [address] , [address]  Office: [phone]  Fax: [phone]

## 2018-11-01 NOTE — PATIENT INSTRUCTIONS
Lymphedema   WHAT YOU NEED TO KNOW:   There is no cure for lymphedema  Treatment can relieve symptoms and prevent lymphedema from getting worse  DISCHARGE INSTRUCTIONS:   Contact your healthcare provider or lymphedema specialist if:   · You have a fever or chills  · You have an open area of skin that looks red or swollen, or drains pus  · Your symptoms, such as swelling or pain, get worse  · Your arms or legs feel heavy, or you cannot move them  · Your skin becomes hard, thick, or rough  · You have a skin wound that will not heal      · Your shoes, clothes, or jewelry feel tighter  · You have questions or concerns about your condition or care  Self-care:   · Elevate  your arm or leg above the level of your heart as often as you can  This will help decrease swelling and pain  Prop your arm or leg on pillows or blankets to keep it elevated comfortably  · Wear compression socks, sleeves, or bandages  as directed  Compression devices must be fitted by a healthcare provider  Compression devices may need to be replaced every 3 to 6 months  · Exercise  can help you maintain or regain function of your arm or leg  Ask your healthcare provider what type of exercise to do and how often to do it  Start slow, take breaks, and gradually do more each day  Do not do vigorous, repeated exercises  Watch for changes in your arm or leg during exercise  Stop and rest if you have swelling, increased pain, or heaviness  Elevate your arm or leg above the level of your heart  · Change your position often  to help move lymphatic fluid through your body  Do not sit or  one position for more than 30 minutes  Do not cross your legs when you sit  These actions can cause lymphatic fluid to buildup  · Maintain a healthy weight  Ask your healthcare provider what you should weigh  Weight loss may improve your symptoms   If you need to lose weight, your healthcare provider can help you create a weight loss program   Prevent infection with proper skin care:  A skin infection can make lymphedema worse  Do the following to decrease your risk for a skin infection in your arm or leg with lymphedema:  · Wash your skin gently and dry it well  Use a mild soap to wash your skin  Gently pat your skin dry after you bathe  Apply a mild cream or lotion to moisturize your skin and prevent dryness or cracking  Keep your feet clean and dry  · Protect your skin from injury  Wear gloves when you garden and wash dishes  Cut your nails straight across to prevent injury to your fingers and toes  Use sunscreen and insect repellant to avoid burns and punctures  Wear slippers in the house  Wear shoes when you go outside  · Check your skin every day for signs of infection  Signs of infection include redness, swelling, increased heat, or pus  You may also have a fever or chills  · Care for cuts, scratches or burns  Apply antibiotic ointment to cuts and other small breaks in the skin  Apply a cold pack or cold water to a burn for 15 minutes  Then wash it with soap and water  Cover scratches, cuts, or burns with a clean, dry gauze or bandage as directed  Keep the area clean and dry  · Tell healthcare providers that you have lymphedema  Tell them not to do, IVs, blood draws, and blood pressure readings in the arm or leg with lymphedema  If there is lymphedema in both arms, ask them to take your blood pressure on your leg  Do not allow flu shots or vaccinations in your arm with lymphedema  Follow up with your healthcare provider or lymphedema specialist as directed: You will need regular visits so healthcare providers can examine the affected areas  You may also be referred to a clinic that specializes in lymphedema treatment  Write down your questions so you remember to ask them during your visits    © 2017 [employer] Information is for End User's use only and may not be sold, redistributed or otherwise used for commercial purposes  All illustrations and images included in CareNotes® are the copyrighted property of [employer]  or [address]  The above information is an  only  It is not intended as medical advice for individual conditions or treatments  Talk to your doctor, nurse or pharmacist before following any medical regimen to see if it is safe and effective for you

## 2018-11-08 DIAGNOSIS — M79.605 LEFT LEG PAIN: ICD-10-CM

## 2018-11-09 RX ORDER — NAPROXEN 500 MG/1
500 TABLET ORAL 2 TIMES DAILY WITH MEALS
Qty: 14 TABLET | Refills: 0 | Status: SHIPPED | OUTPATIENT
Start: 2018-11-09 | End: 2021-02-09 | Stop reason: ALTCHOICE

## 2018-11-12 ENCOUNTER — TELEPHONE (OUTPATIENT)
Dept: INTERNAL MEDICINE CLINIC | Facility: CLINIC | Age: 58
End: 2018-11-12

## 2018-11-12 DIAGNOSIS — J44.9 COPD (CHRONIC OBSTRUCTIVE PULMONARY DISEASE) (HCC): Primary | ICD-10-CM

## 2018-11-12 RX ORDER — FLUTICASONE FUROATE AND VILANTEROL 100; 25 UG/1; UG/1
1 POWDER RESPIRATORY (INHALATION) DAILY
Qty: 1 INHALER | Refills: 8 | Status: SHIPPED | OUTPATIENT
Start: 2018-11-12 | End: 2019-05-30 | Stop reason: SDUPTHER

## 2018-11-12 NOTE — TELEPHONE ENCOUNTER
Patient had called me to inform me that his [employer]ering is not covered by his insurance company , so I verified and the [employer] and the [facility] are covered

## 2018-11-12 NOTE — TELEPHONE ENCOUNTER
I sent the [facility] [place], [facility]  Please call patient and let him know that this inhaler is a once daily unlike his Dulera dosing  Thank you

## 2018-11-16 ENCOUNTER — HOSPITAL ENCOUNTER (OUTPATIENT)
Dept: SLEEP CENTER | Facility: CLINIC | Age: 58
Discharge: HOME | End: 2018-11-16
Payer: COMMERCIAL

## 2018-11-16 DIAGNOSIS — Z86.69 HISTORY OF OBSTRUCTIVE SLEEP APNEA: ICD-10-CM

## 2018-11-16 PROCEDURE — 95810 POLYSOM 6/> YRS 4/> PARAM: CPT

## 2018-11-17 NOTE — PROGRESS NOTES
Sleep Study Documentation    Pre-Sleep Study       Sleep testing procedure explained to patient:YES    Patient napped prior to study:NO    Caffeine:Dayshift worker after 12PM   Caffeine use:NO    Alcohol:Dayshift workers after 5PM: Alcohol use:NO    Typical day for patient:YES       Study Documentation  Diagnostic   Snore:Mild  Supplemental O2: no    O2 flow rate (L/min) range   O2 flow rate (L/min) final   Minimum SaO2 75%  Baseline SaO2 91%        Mode of Therapy:    EKG abnormalities: no     EEG abnormalities: no    Study Terminated:no    Patient classification: retired       Post-Sleep Study    Medication used at bedtime or during sleep study:YES other prescription medications    Patient reports time it took to fall asleep:greater than 60 minutes    Patient reports waking up during study:3 or more times  Patient reports returning to sleep in 10 to 30 minutes  Patient reports sleeping less than 2 hours without dreaming  Patient reports sleep during study:worse than usual    Patient rated sleepiness: Very sleepy or tired    PAP treatment:no  59year old male pt in for diagnostic study  Currently pt on cpap at home last ten years  Pt was observed with severe obstructive events, mild snoring, and some PLMS  Pt will benefit from cpap therapy

## 2018-11-20 ENCOUNTER — TELEPHONE (OUTPATIENT)
Dept: SLEEP CENTER | Facility: CLINIC | Age: 58
End: 2018-11-20

## 2018-11-20 NOTE — TELEPHONE ENCOUNTER
Attempted to contact pt, LM to CB to discuss sleep study    Sleep study reveals DAVID, need CON with Dr Randi Johansen

## 2018-11-26 ENCOUNTER — TELEPHONE (OUTPATIENT)
Dept: INTERNAL MEDICINE CLINIC | Facility: CLINIC | Age: 58
End: 2018-11-26

## 2018-11-26 DIAGNOSIS — G47.33 OSA (OBSTRUCTIVE SLEEP APNEA): Primary | ICD-10-CM

## 2018-11-27 NOTE — TELEPHONE ENCOUNTER
Patient called back regarding this form, he stated that his electric is going to get shut off tomorrow     Please advise so we can call patient back to let him know this has been taken care of     You are doc of the day         We can call patient at this number   [phone]

## 2018-12-03 ENCOUNTER — OFFICE VISIT (OUTPATIENT)
Dept: SLEEP CENTER | Facility: CLINIC | Age: 58
End: 2018-12-03
Payer: COMMERCIAL

## 2018-12-03 VITALS
WEIGHT: 257 LBS | DIASTOLIC BLOOD PRESSURE: 80 MMHG | HEIGHT: 68 IN | HEART RATE: 70 BPM | BODY MASS INDEX: 38.95 KG/M2 | SYSTOLIC BLOOD PRESSURE: 124 MMHG

## 2018-12-03 DIAGNOSIS — G47.33 OSA (OBSTRUCTIVE SLEEP APNEA): Primary | ICD-10-CM

## 2018-12-03 DIAGNOSIS — J44.9 CHRONIC OBSTRUCTIVE PULMONARY DISEASE, UNSPECIFIED COPD TYPE (HCC): ICD-10-CM

## 2018-12-03 DIAGNOSIS — E66.9 OBESITY (BMI 30-39.9): ICD-10-CM

## 2018-12-03 DIAGNOSIS — I10 ESSENTIAL HYPERTENSION: ICD-10-CM

## 2018-12-03 DIAGNOSIS — I25.119 CORONARY ARTERY DISEASE INVOLVING NATIVE HEART WITH ANGINA PECTORIS, UNSPECIFIED VESSEL OR LESION TYPE (HCC): ICD-10-CM

## 2018-12-03 DIAGNOSIS — J96.11 CHRONIC RESPIRATORY FAILURE WITH HYPOXIA (HCC): ICD-10-CM

## 2018-12-03 PROCEDURE — 99244 OFF/OP CNSLTJ NEW/EST MOD 40: CPT | Performed by: INTERNAL MEDICINE

## 2018-12-03 NOTE — PROGRESS NOTES
Consultation - [address]  59 y o  male  :1960  QZL:[id number]    Physician Requesting Consult: Jg Rees DO     Reason for Consult : At your kind request I saw this patient for initial sleep evaluation today  Patient undertook a diagnostic sleep study is here to review results and treatment options  The study demonstrated severe obstructive sleep apnea:  /hour  Minimum oxygen saturation 75%  and 97 5% of total sleep time was spent with saturations less than 90%  Intermittent snoring of mild was noted  There were mild periodic limb movements of sleep and severe sleep fragmentation  PFSH, Problem List, Medications & Allergies were reviewed in EMR  He  has a past medical history of CAD (coronary artery disease); COPD (chronic obstructive pulmonary disease) ([facility] [address] ); Gout; Hyperlipidemia; Hypertension; Orthopnea; Pericardial effusion; and Sleep apnea  He has a current medication list which includes the following prescription(s): acetaminophen, albuterol, allopurinol, amlodipine, aspirin, atorvastatin, atorvastatin, cvs allergy relief, cvs allergy relief, cvs aspirin ec, doxazosin, fluticasone-vilanterol, furosemide, gabapentin, ipratropium, loratadine, losartan, mometasone-formoterol, and naproxen  HPI:  He was diagnosed with obstructive sleep apnea around 8 years ago and prescribed CPAP  A repeat diagnostic study was undertaken to document Obstructive sleep apnea to justify ongoing use of CPAP that he uses CPAP regularly and is unable to sleep without it  He is not experiencing any adverse effects  Recently his machine broke and he was provided with a loaner machine set at 12 cm H2O  Restless Leg Syndrome: reports no suggestive symptoms but reports lower extremity pain due to degenerative joint disease involving ankles and knees  Parasomnia activity: no features reported   Sleep Routine: Typical Bedtime:  9:00 p m   Gets OOB:  8 -9 a m  TIB: 11-12 hrs Estimated TST@:  8-9 hrs  Sleep latency: upto 60 minutes because he watches TV in bed Sleep Interruptions: infrequent x/night   Awakens: spontaneously feeling refreshed  He denied Excessive Daytime Sleepiness or napping  [place] Sleepiness Scale rated at Total score: 4 /24  Habits: reports that he quit smoking about 3 years ago  His smoking use included Cigarettes  He has a 9 20 pack-year smoking history  He has never used smokeless tobacco , reports that he does not drink alcohol ,  reports that he does not use drugs  ,Caffeine use: limited , Exercise routine: regular    Family History: Negative for sleep disturbance  ROS: reviewed & as attached  Significant for weight has been stable  COPD is controlled on current medication and he reports no nasal, respiratory or cardiac symptoms  EXAM:    Vitals /80   Pulse 70   Ht 5' 8" (1 727 m)   Wt 117 kg (257 lb)   BMI 39 08 kg/m²     General  Well groomed male, appears stated age, in no apparent distress  Psychiatric  Alert and cooperative  Mental state appears normal  Judgement & Insight  good   Head   Craniofacial anatomy:retrognathia Sinuses: non- tender  TMJ: Normal     Eyes   EOM's intact, conjunctiva/corneas clear         Nasal Airway  is patent Septum:central, Mucous membranes:appear normal     Turbinates:  are normal  There is no rhinorrhea; No PND     Oral   Airway   crowded Tongue:Modified Mallampati class IV (only hard palate visible)  Palate:  redundant soft palateTonsils: no hypertrophy  Teeth: edentulous      Neck    appears thick and there's extra fatty tissue; Neck Circumference: 45cm; Supple; no abnormal masses; Thyroid:normal  Trachea:central      Lymph    No Cervical or Submandibular Lymhadenopathy   Heart:    RRR; S1,S2 soft; no gallop; nomurmurs     Lungs   Respiratory Effort:normal  Air entry is slightly reduced bilaterally  No wheezes   No rales   Abdomen   Obese, Soft & non-tender     Extremities    1+ pedal edema left lower extremity  No clubbing or cyanosis  Skin   Skin is warm and dry; Color& Hydration good; no facial rashes or lesions    Neurologic  Speech is clear and coherent  CNII-XII intact  Rombergs Negative  Muscskeltl    Muscle bulk, tone and power WNL Gait:normal          IMPRESSION: Primary Sleep/Secondary(to Medical or Psych conditions) & comorbidities   1  DAVID (obstructive sleep apnea)  Ambulatory referral to Sleep Medicine    CPAP Study   2  Chronic obstructive pulmonary disease, unspecified COPD type ([facility] [address] )  CPAP Study   3  Chronic respiratory failure with hypoxia ([facility])     4  Essential hypertension     5  Coronary artery disease involving native heart with angina pectoris, unspecified vessel or lesion type ([facility])     6  Obesity (BMI 30-39  9)          PLAN:   1  I reviewed results of the Sleep study with the patient  2  With respect to above conditions, I counseled on pathophysiology, diagnosis, treatment options, risks and benefits; inter-relationship and effects on symptoms and comorbidities; risks of no treatment; costs and insurance aspects  3  I also advised on weight reduction  4  Patient elected to continue positive airway pressure therapy and it is medically necessary  He is to be scheduled for a re-titration study and may do better with BiPAP instead of CPAP  5  Follow-up to be scheduled after the study to review results and to initiate therapy           Sincerely,     Authenticated electronically by Demario Porter MD   on 80/05/52   Board Certified Specialist

## 2018-12-03 NOTE — PATIENT INSTRUCTIONS
What is DAVID? Obstructive sleep apnea is a common and serious sleep disorder that causes you to stop breathing during sleep  The airway repeatedly becomes blocked, limiting the amount of air that reaches your lungs  When this happens, you may snore loudly or making choking noises as you try to breathe  Your brain and body becomes oxygen deprived and you may wake up  This may happen a few times a night, or in more severe cases, several hundred times a night  Sleep apnea can make you wake up in the morning feeling tired or unrefreshed even though you have had a full night of sleep  During the day, you may feel fatigued, have difficulty concentrating or you may even unintentionally fall asleep  This is because your body is waking up numerous times throughout the night, even though you might not be conscious of each awakening  The lack of oxygen your body receives can have negative long-term consequences for your health  This includes:  High blood pressure  Heart disease  Irregular heart rhythms  Stroke  Pre-diabetes and diabetes  Depression    Testing  An objective evaluation of your sleep may be needed before your board certified sleep physician can make a diagnosis  Options include:   In-lab overnight sleep study  This type of sleep study requires you to stay overnight at a sleep center, in a bed that may resemble a hotel room  You will sleep with sensors hooked up to various parts of your body  These sensors record your brain waves, heartbeat, breathing and movement  An overnight sleep study also provides your doctor with the most complete information about your sleep  Learn more about an overnight sleep study      Home sleep apnea test  Some patients with high risk factors for obstructive sleep apnea and no other medical disorders may be candidates for a home sleep apnea test  The testing equipment differs in that it is less complicated than what is used in an overnight sleep study   As such, does not give all the data an in-lab will and if negative, may not mean you do not have the problem  Treatment for sleep apnea  includes using a continuous positive airway pressure (CPAP) machine to keep your airway open during sleep  A mask is placed over your nose and mouth, or just your nose  The mask is hooked to the CPAP machine that blows a gentle stream of air into the mask when you breathe  This helps keep your airway open so you can breathe more regularly  Extra oxygen may be given to you through the machine  You may be given a mouth device  It looks like a mouth guard or dental retainer and stops your tongue and mouth tissues from blocking your throat while you sleep  Surgery may be needed to remove extra tissues that block your mouth, throat, or nose  Manage sleep apnea:   Do not smoke  Nicotine and other chemicals in cigarettes and cigars can cause lung damage  Ask your healthcare provider for information if you currently smoke and need help to quit  E-cigarettes or smokeless tobacco still contain nicotine  Talk to your healthcare provider before you use these products  Do not drink alcohol or take sedative medicine before you go to sleep  Alcohol and sedatives can relax the muscles and tissues around your throat  This can block the airflow to your lungs  Maintain a healthy weight  Excess tissue around your throat may restrict your breathing  Ask your healthcare provider for information if you need to lose weight  Sleep on your side or use pillows designed to prevent sleep apnea  This prevents your tongue or other tissues from blocking your throat  You can also raise the head of your bed  Driving Safety  Refrain from driving when drowsy  Follow up with your healthcare provider as directed:  Write down your questions so you remember to ask them during your visits  Go to [employer] website for more information: Sleepeducation  org     What is DAVID?    Obstructive sleep apnea is a common and serious sleep disorder that causes you to stop breathing during sleep  The airway repeatedly becomes blocked, limiting the amount of air that reaches your lungs  When this happens, you may snore loudly or making choking noises as you try to breathe  Your brain and body becomes oxygen deprived and you may wake up  This may happen a few times a night, or in more severe cases, several hundred times a night  Sleep apnea can make you wake up in the morning feeling tired or unrefreshed even though you have had a full night of sleep  During the day, you may feel fatigued, have difficulty concentrating or you may even unintentionally fall asleep  This is because your body is waking up numerous times throughout the night, even though you might not be conscious of each awakening  The lack of oxygen your body receives can have negative long-term consequences for your health  This includes:  High blood pressure  Heart disease  Irregular heart rhythms  Stroke  Pre-diabetes and diabetes  Depression    Testing  An objective evaluation of your sleep may be needed before your board certified sleep physician can make a diagnosis  Options include:   In-lab overnight sleep study  This type of sleep study requires you to stay overnight at a sleep center, in a bed that may resemble a hotel room  You will sleep with sensors hooked up to various parts of your body  These sensors record your brain waves, heartbeat, breathing and movement  An overnight sleep study also provides your doctor with the most complete information about your sleep  Learn more about an overnight sleep study      Home sleep apnea test  Some patients with high risk factors for obstructive sleep apnea and no other medical disorders may be candidates for a home sleep apnea test  The testing equipment differs in that it is less complicated than what is used in an overnight sleep study   As such, does not give all the data an in-lab will and if negative, may not mean you do not have the problem  Treatment for sleep apnea  includes using a continuous positive airway pressure (CPAP) machine to keep your airway open during sleep  A mask is placed over your nose and mouth, or just your nose  The mask is hooked to the CPAP machine that blows a gentle stream of air into the mask when you breathe  This helps keep your airway open so you can breathe more regularly  Extra oxygen may be given to you through the machine  You may be given a mouth device  It looks like a mouth guard or dental retainer and stops your tongue and mouth tissues from blocking your throat while you sleep  Surgery may be needed to remove extra tissues that block your mouth, throat, or nose  Manage sleep apnea:   Do not smoke  Nicotine and other chemicals in cigarettes and cigars can cause lung damage  Ask your healthcare provider for information if you currently smoke and need help to quit  E-cigarettes or smokeless tobacco still contain nicotine  Talk to your healthcare provider before you use these products  Do not drink alcohol or take sedative medicine before you go to sleep  Alcohol and sedatives can relax the muscles and tissues around your throat  This can block the airflow to your lungs  Maintain a healthy weight  Excess tissue around your throat may restrict your breathing  Ask your healthcare provider for information if you need to lose weight  Sleep on your side or use pillows designed to prevent sleep apnea  This prevents your tongue or other tissues from blocking your throat  You can also raise the head of your bed  Driving Safety  Refrain from driving when drowsy  Follow up with your healthcare provider as directed:  Write down your questions so you remember to ask them during your visits  Go to AAS website for more information: Sleepeducation  org

## 2018-12-03 NOTE — PROGRESS NOTES
Review of Systems      Genitourinary none   Cardiology ankle/leg swelling   Gastrointestinal none   Neurology muscle weakness   Constitutional none   Integumentary none   Psychiatry none   Musculoskeletal joint pain, muscle aches and back pain   Pulmonary shortness of breath with activity   ENT none   Endocrine none   Hematological none

## 2018-12-04 ENCOUNTER — HOSPITAL ENCOUNTER (OUTPATIENT)
Dept: SLEEP CENTER | Facility: CLINIC | Age: 58
Discharge: HOME | End: 2018-12-04
Payer: COMMERCIAL

## 2018-12-04 DIAGNOSIS — G47.33 OSA (OBSTRUCTIVE SLEEP APNEA): ICD-10-CM

## 2018-12-04 DIAGNOSIS — J44.9 CHRONIC OBSTRUCTIVE PULMONARY DISEASE, UNSPECIFIED COPD TYPE (HCC): ICD-10-CM

## 2018-12-04 PROCEDURE — 95811 POLYSOM 6/>YRS CPAP 4/> PARM: CPT

## 2018-12-05 NOTE — PROGRESS NOTES
Sleep Study Documentation    Pre-Sleep Study       Sleep testing procedure explained to patient:YES    Patient napped prior to study:NO    Caffeine:  Caffeine use:YES- coffee  6 ounces    Alcohol: Alcohol use:NO  Typical day for patient:YES       Study Documentation  Treatment   Optimal PAP pressure: 17  Leak:Small  Snore:Eliminated  REM Obtained:yes  Supplemental O2: yes  O2 flow rate (L/min) range 1  O2 flow rate (L/min) final 3  Minimum SaO2 75%  Baseline SaO2 95%  PAP mask tried (list all)DUNBAR & PYKEL SIMPLUS LARGE FULL FACE PT WEARS FULL FACE ASK ALREADY  PAP mask choice (final)DUNBAR & PYKEL SIMPLIS LARGE FULL FACE  PAP mask type:full face  PAP pressure at which snoring was eliminated   Minimum SaO2 at final PAP pressure 78%  Mode of Therapy:CPAP  SWITCHED TO BIPAP THEN CPAP AGAIN  ETCO2:No  CPAP changed to BiPAP:Yes  If yes why PT HAS COPD THOUGHT HIGHER GRADIENT WOULD AID    Mode of Therapy:CPAP    EKG abnormalities: yes:  EPOCH example and comments:     EEG abnormalities: no    Study Terminated:no    Patient classification: retired       Post-Sleep Study    Medication used at bedtime or during sleep study:YES other prescription medications    Patient reports time it took to fall asleep:20 to 30 minutes    Patient reports waking up during study:1 to 2 times  Patient reports returning to sleep without difficulty  Patient reports sleeping 4 to 6 hours without dreaming  Patient reports sleep during study:typical    Patient rated sleepiness: Somewhat sleepy or tired    PAP treatment:yes: Post PAP treatment patient reports feeling unchanged and would wear PAP mask at home

## 2018-12-06 DIAGNOSIS — G47.33 OSA (OBSTRUCTIVE SLEEP APNEA): Primary | ICD-10-CM

## 2018-12-06 DIAGNOSIS — J30.2 SEASONAL ALLERGIC RHINITIS, UNSPECIFIED TRIGGER: ICD-10-CM

## 2018-12-06 RX ORDER — CHOLECALCIFEROL (VITAMIN D3) 50 MCG
CAPSULE ORAL
Qty: 30 TABLET | Refills: 2 | Status: SHIPPED | OUTPATIENT
Start: 2018-12-06 | End: 2019-07-08 | Stop reason: SDUPTHER

## 2018-12-07 ENCOUNTER — TELEPHONE (OUTPATIENT)
Dept: SLEEP CENTER | Facility: CLINIC | Age: 58
End: 2018-12-07

## 2019-05-22 DIAGNOSIS — J44.9 CHRONIC OBSTRUCTIVE PULMONARY DISEASE, UNSPECIFIED COPD TYPE (HCC): ICD-10-CM

## 2019-05-23 RX ORDER — ALBUTEROL SULFATE 90 UG/1
2 AEROSOL, METERED RESPIRATORY (INHALATION) EVERY 6 HOURS PRN
Qty: 8 G | Refills: 5 | Status: SHIPPED | OUTPATIENT
Start: 2019-05-23 | End: 2019-05-30 | Stop reason: SDUPTHER

## 2019-05-24 DIAGNOSIS — J44.9 CHRONIC OBSTRUCTIVE PULMONARY DISEASE, UNSPECIFIED COPD TYPE (HCC): ICD-10-CM

## 2019-05-24 RX ORDER — ALBUTEROL SULFATE 90 UG/1
2 AEROSOL, METERED RESPIRATORY (INHALATION) EVERY 6 HOURS PRN
Qty: 8 G | Refills: 5 | Status: CANCELLED | OUTPATIENT
Start: 2019-05-24

## 2019-05-30 DIAGNOSIS — J44.9 CHRONIC OBSTRUCTIVE PULMONARY DISEASE, UNSPECIFIED COPD TYPE (HCC): ICD-10-CM

## 2019-05-30 DIAGNOSIS — J44.9 COPD (CHRONIC OBSTRUCTIVE PULMONARY DISEASE) (HCC): ICD-10-CM

## 2019-05-30 RX ORDER — ALBUTEROL SULFATE 90 UG/1
2 AEROSOL, METERED RESPIRATORY (INHALATION) EVERY 6 HOURS PRN
Qty: 8 G | Refills: 5 | Status: SHIPPED | OUTPATIENT
Start: 2019-05-30 | End: 2019-07-08 | Stop reason: SDUPTHER

## 2019-05-30 RX ORDER — FLUTICASONE FUROATE AND VILANTEROL 100; 25 UG/1; UG/1
1 POWDER RESPIRATORY (INHALATION) DAILY
Qty: 1 INHALER | Refills: 8 | Status: SHIPPED | OUTPATIENT
Start: 2019-05-30 | End: 2019-07-08 | Stop reason: SDUPTHER

## 2019-07-08 ENCOUNTER — OFFICE VISIT (OUTPATIENT)
Dept: INTERNAL MEDICINE CLINIC | Facility: CLINIC | Age: 59
End: 2019-07-08

## 2019-07-08 VITALS
BODY MASS INDEX: 39.31 KG/M2 | TEMPERATURE: 98.3 F | DIASTOLIC BLOOD PRESSURE: 80 MMHG | WEIGHT: 250.44 LBS | HEART RATE: 78 BPM | SYSTOLIC BLOOD PRESSURE: 136 MMHG | HEIGHT: 67 IN

## 2019-07-08 DIAGNOSIS — J44.9 CHRONIC OBSTRUCTIVE PULMONARY DISEASE, UNSPECIFIED COPD TYPE (HCC): ICD-10-CM

## 2019-07-08 DIAGNOSIS — J44.9 COPD (CHRONIC OBSTRUCTIVE PULMONARY DISEASE) (HCC): ICD-10-CM

## 2019-07-08 DIAGNOSIS — R07.81 RIB PAIN ON RIGHT SIDE: Primary | ICD-10-CM

## 2019-07-08 PROCEDURE — 99212 OFFICE O/P EST SF 10 MIN: CPT | Performed by: INTERNAL MEDICINE

## 2019-07-08 PROCEDURE — 3008F BODY MASS INDEX DOCD: CPT | Performed by: INTERNAL MEDICINE

## 2019-07-08 RX ORDER — FLUTICASONE FUROATE AND VILANTEROL 100; 25 UG/1; UG/1
1 POWDER RESPIRATORY (INHALATION) DAILY
Qty: 2 INHALER | Refills: 5 | Status: SHIPPED | OUTPATIENT
Start: 2019-07-08 | End: 2020-04-30

## 2019-07-08 RX ORDER — ALBUTEROL SULFATE 90 UG/1
2 AEROSOL, METERED RESPIRATORY (INHALATION) EVERY 6 HOURS PRN
Qty: 16 G | Refills: 2 | Status: SHIPPED | OUTPATIENT
Start: 2019-07-08 | End: 2019-12-04 | Stop reason: SDUPTHER

## 2019-07-08 NOTE — PROGRESS NOTES
Assessment/Plan:      Diagnoses and all orders for this visit:    Rib pain on right side    Chronic [facility] pulmonary disease, unspecified [facility]  -     albuterol (VENTOLIN HFA) 90 mcg/act inhaler; Inhale 2 puffs every 6 (six) hours as needed for wheezing    COPD (chronic obstructive pulmonary disease) ([facility])  -     fluticasone-vilanterol (BREO ELLIPTA) 100-25 mcg/inh inhaler; Inhale 1 puff daily Rinse mouth after use  Plan: right rib pain/abd pain present only on palpation is now resolved and not reproducible  pt reassured that based on his clinical presentation at this time there are no concerning findings, instructed that should he develop any issue such as nausea, vomiting, abdominal pain that is present as a clinical symptom then he can notify us  Requested refills of his ventolin inhaler, will provide refills and instructed pt to follow up with his PCP for further evaluation of COPD  Subjective:     Patient ID: Adalberto Homans is a 59 y o  male  59year-old male who presents for evaluation of right upper quadrant abdominal pain/rib pain that was present on palpation by home nursing provider earlier today, resolved at this time  Patient states that with his insurance he has routine nursing provider evaluations at home for checkup of his vitals  He states that today the nursing provider was pressing very hard on his abdomen and this was painful along the right side of the ribs and due to that she urged him to make a clinic appointment immediately, patient states that nursing provider would not leave until he had appointment scheduled today  Patient has no pain at this time, states that if he did not have this evaluation at home today he would have not come to the clinic as he has no symptoms  He denies nausea, vomiting, diarrhea, constipation  Feels well at this time other than his chronic COPD  Review of Systems   Constitutional: Negative for chills and fever     Cardiovascular: Negative for chest pain  Gastrointestinal: Negative for abdominal pain  Objective:     Physical Exam    General:  Obese male, comfortable, nontoxic appearing  HENT: normocephalic  Eyes:  Sclera anicteric  Neck: supple,  Lungs:   Bibasilar crackles with scattered wheezes, mildly increased work of breathing with ambulation, otherwise normal respiratory rate, no respiratory distress  Heart:  Regular rate rhythm without murmur  Abdomen: soft, obese, nontender to palpation of all quadrants, negative Light sign, no peritoneal signs  Extremities: no cyanosis, warm to touch, no deformity  Neuro:   Orientated x3, speech is clear  Skin: warm, dry, intact,        Cara Holstein, D O       Internal Medicine   PGY-3  7/8/2019 5:20 PM

## 2019-08-22 DIAGNOSIS — I10 ESSENTIAL HYPERTENSION: Chronic | ICD-10-CM

## 2019-08-27 RX ORDER — ASPIRIN 81 MG/1
TABLET ORAL
Qty: 90 TABLET | Refills: 2 | Status: SHIPPED | OUTPATIENT
Start: 2019-08-27 | End: 2021-04-19 | Stop reason: SDUPTHER

## 2019-12-04 DIAGNOSIS — J44.9 CHRONIC OBSTRUCTIVE PULMONARY DISEASE, UNSPECIFIED COPD TYPE (HCC): ICD-10-CM

## 2020-01-28 DIAGNOSIS — I10 HYPERTENSION, UNSPECIFIED TYPE: ICD-10-CM

## 2020-01-28 DIAGNOSIS — I10 ESSENTIAL HYPERTENSION: Chronic | ICD-10-CM

## 2020-01-28 DIAGNOSIS — E78.5 HYPERLIPIDEMIA, UNSPECIFIED HYPERLIPIDEMIA TYPE: Primary | ICD-10-CM

## 2020-01-28 DIAGNOSIS — M10.9 GOUTY ARTHRITIS: ICD-10-CM

## 2020-01-28 RX ORDER — DOXAZOSIN MESYLATE 4 MG/1
4 TABLET ORAL DAILY
Qty: 90 TABLET | Refills: 2 | Status: SHIPPED | OUTPATIENT
Start: 2020-01-28 | End: 2020-11-02 | Stop reason: SDUPTHER

## 2020-01-28 RX ORDER — AMLODIPINE BESYLATE 10 MG/1
10 TABLET ORAL DAILY
Qty: 90 TABLET | Refills: 2 | Status: SHIPPED | OUTPATIENT
Start: 2020-01-28 | End: 2020-11-02 | Stop reason: SDUPTHER

## 2020-01-28 RX ORDER — ATORVASTATIN CALCIUM 40 MG/1
40 TABLET, FILM COATED ORAL
Qty: 90 TABLET | Refills: 2 | Status: SHIPPED | OUTPATIENT
Start: 2020-01-28 | End: 2020-12-28 | Stop reason: SDUPTHER

## 2020-01-28 RX ORDER — LOSARTAN POTASSIUM 100 MG/1
100 TABLET ORAL DAILY
Qty: 90 TABLET | Refills: 2 | Status: SHIPPED | OUTPATIENT
Start: 2020-01-28 | End: 2020-11-02 | Stop reason: SDUPTHER

## 2020-04-30 DIAGNOSIS — J44.9 COPD (CHRONIC OBSTRUCTIVE PULMONARY DISEASE) (HCC): ICD-10-CM

## 2020-04-30 DIAGNOSIS — J44.9 CHRONIC OBSTRUCTIVE PULMONARY DISEASE, UNSPECIFIED COPD TYPE (HCC): ICD-10-CM

## 2020-04-30 RX ORDER — FLUTICASONE FUROATE AND VILANTEROL 100; 25 UG/1; UG/1
1 POWDER RESPIRATORY (INHALATION) DAILY
Qty: 2 INHALER | Refills: 5 | Status: SHIPPED | OUTPATIENT
Start: 2020-04-30 | End: 2020-11-02 | Stop reason: SDUPTHER

## 2020-07-16 ENCOUNTER — TELEPHONE (OUTPATIENT)
Dept: INTERNAL MEDICINE CLINIC | Facility: CLINIC | Age: 60
End: 2020-07-16

## 2020-07-16 NOTE — TELEPHONE ENCOUNTER
Mitzy Read [facility] called stating she completeled patients PAD screening  Left foot 0 91  Right foot 0 89   Call trish with any questions [phone]

## 2020-07-16 NOTE — TELEPHONE ENCOUNTER
Mal Fishman was calling from [employer]  This test is a routine screening for patients insurance company ordered by them  This is just an fyi on results

## 2020-10-25 DIAGNOSIS — I10 HYPERTENSION, UNSPECIFIED TYPE: ICD-10-CM

## 2020-10-25 DIAGNOSIS — I25.119 CORONARY ARTERY DISEASE INVOLVING NATIVE HEART WITH ANGINA PECTORIS, UNSPECIFIED VESSEL OR LESION TYPE (HCC): ICD-10-CM

## 2020-10-25 DIAGNOSIS — I10 ESSENTIAL HYPERTENSION: Chronic | ICD-10-CM

## 2020-10-25 DIAGNOSIS — E78.5 HYPERLIPIDEMIA, UNSPECIFIED HYPERLIPIDEMIA TYPE: ICD-10-CM

## 2020-10-25 RX ORDER — DOXAZOSIN MESYLATE 4 MG/1
TABLET ORAL
Qty: 90 TABLET | Refills: 2 | OUTPATIENT
Start: 2020-10-25

## 2020-10-25 RX ORDER — ATORVASTATIN CALCIUM 40 MG/1
TABLET, FILM COATED ORAL
Qty: 90 TABLET | Refills: 2 | OUTPATIENT
Start: 2020-10-25

## 2020-10-25 RX ORDER — AMLODIPINE BESYLATE 10 MG/1
TABLET ORAL
Qty: 90 TABLET | Refills: 2 | OUTPATIENT
Start: 2020-10-25

## 2020-10-25 RX ORDER — LOSARTAN POTASSIUM 100 MG/1
TABLET ORAL
Qty: 90 TABLET | Refills: 2 | OUTPATIENT
Start: 2020-10-25

## 2020-11-02 DIAGNOSIS — J44.9 CHRONIC OBSTRUCTIVE PULMONARY DISEASE, UNSPECIFIED COPD TYPE (HCC): ICD-10-CM

## 2020-11-02 DIAGNOSIS — J44.9 COPD (CHRONIC OBSTRUCTIVE PULMONARY DISEASE) (HCC): ICD-10-CM

## 2020-11-02 RX ORDER — ATORVASTATIN CALCIUM 40 MG/1
40 TABLET, FILM COATED ORAL
Qty: 90 TABLET | Refills: 1 | Status: SHIPPED | OUTPATIENT
Start: 2020-11-02 | End: 2020-12-28 | Stop reason: ALTCHOICE

## 2020-11-02 RX ORDER — AMLODIPINE BESYLATE 10 MG/1
10 TABLET ORAL DAILY
Qty: 90 TABLET | Refills: 2 | Status: SHIPPED | OUTPATIENT
Start: 2020-11-02 | End: 2021-04-19 | Stop reason: SDUPTHER

## 2020-11-02 RX ORDER — LOSARTAN POTASSIUM 100 MG/1
100 TABLET ORAL DAILY
Qty: 90 TABLET | Refills: 2 | Status: SHIPPED | OUTPATIENT
Start: 2020-11-02 | End: 2021-04-19 | Stop reason: SDUPTHER

## 2020-11-02 RX ORDER — ALBUTEROL SULFATE 90 UG/1
2 AEROSOL, METERED RESPIRATORY (INHALATION) EVERY 6 HOURS PRN
Qty: 8.5 INHALER | Refills: 0 | Status: SHIPPED | OUTPATIENT
Start: 2020-11-02 | End: 2021-01-27

## 2020-11-02 RX ORDER — ALBUTEROL SULFATE 90 UG/1
AEROSOL, METERED RESPIRATORY (INHALATION)
Qty: 8.5 G | OUTPATIENT
Start: 2020-11-02

## 2020-11-02 RX ORDER — FLUTICASONE FUROATE AND VILANTEROL TRIFENATATE 100; 25 UG/1; UG/1
1 POWDER RESPIRATORY (INHALATION) DAILY
Qty: 2 INHALER | Refills: 5 | Status: SHIPPED | OUTPATIENT
Start: 2020-11-02 | End: 2021-01-27 | Stop reason: SDUPTHER

## 2020-11-02 RX ORDER — DOXAZOSIN MESYLATE 4 MG/1
4 TABLET ORAL DAILY
Qty: 90 TABLET | Refills: 2 | Status: SHIPPED | OUTPATIENT
Start: 2020-11-02 | End: 2021-04-19 | Stop reason: SDUPTHER

## 2020-12-24 DIAGNOSIS — E78.5 HYPERLIPIDEMIA, UNSPECIFIED HYPERLIPIDEMIA TYPE: ICD-10-CM

## 2020-12-24 RX ORDER — ATORVASTATIN CALCIUM 40 MG/1
TABLET, FILM COATED ORAL
Qty: 90 TABLET | Refills: 2 | OUTPATIENT
Start: 2020-12-24

## 2020-12-28 DIAGNOSIS — E78.5 HYPERLIPIDEMIA, UNSPECIFIED HYPERLIPIDEMIA TYPE: ICD-10-CM

## 2020-12-28 RX ORDER — ATORVASTATIN CALCIUM 40 MG/1
40 TABLET, FILM COATED ORAL
Qty: 90 TABLET | Refills: 2 | Status: SHIPPED | OUTPATIENT
Start: 2020-12-28 | End: 2021-04-19 | Stop reason: SDUPTHER

## 2021-01-22 ENCOUNTER — TELEPHONE (OUTPATIENT)
Dept: INTERNAL MEDICINE CLINIC | Facility: CLINIC | Age: 61
End: 2021-01-22

## 2021-01-22 DIAGNOSIS — J44.9 CHRONIC OBSTRUCTIVE PULMONARY DISEASE, UNSPECIFIED COPD TYPE (HCC): ICD-10-CM

## 2021-01-27 DIAGNOSIS — J44.9 COPD (CHRONIC OBSTRUCTIVE PULMONARY DISEASE) (HCC): ICD-10-CM

## 2021-01-27 RX ORDER — FLUTICASONE FUROATE AND VILANTEROL TRIFENATATE 100; 25 UG/1; UG/1
1 POWDER RESPIRATORY (INHALATION) DAILY
Qty: 2 INHALER | Refills: 5 | Status: SHIPPED | OUTPATIENT
Start: 2021-01-27 | End: 2021-02-09

## 2021-01-27 RX ORDER — ALBUTEROL SULFATE 90 UG/1
AEROSOL, METERED RESPIRATORY (INHALATION)
Qty: 8.5 G | Refills: 2 | Status: SHIPPED | OUTPATIENT
Start: 2021-01-27 | End: 2021-01-29 | Stop reason: SDUPTHER

## 2021-01-27 NOTE — TELEPHONE ENCOUNTER
Name of medication, dose, quantity and frequency  Requested Prescriptions     Pending Prescriptions Disp Refills    fluticasone-vilanterol (Breo Ellipta) 100-25 mcg/inh inhaler 2 Inhaler 5     Sig: Inhale 1 puff daily       Number of refills left:    Amount of medication left:    Pharmacy verified and updated    Additional information:

## 2021-01-29 DIAGNOSIS — J44.9 CHRONIC OBSTRUCTIVE PULMONARY DISEASE, UNSPECIFIED COPD TYPE (HCC): ICD-10-CM

## 2021-01-29 RX ORDER — ALBUTEROL SULFATE 90 UG/1
2 AEROSOL, METERED RESPIRATORY (INHALATION) EVERY 6 HOURS PRN
Qty: 8.5 G | Refills: 2 | Status: SHIPPED | OUTPATIENT
Start: 2021-01-29 | End: 2021-02-09 | Stop reason: SDUPTHER

## 2021-01-29 NOTE — TELEPHONE ENCOUNTER
DOC OF THE DAY (AM)  PATIENT CALLED, [facility] DOES NOT HAVE ALBUTEROL IN STOCK  THEY CANCELLED OUT THE PATIENT'S SCRIPT IN THEIR COMPUTER  PATIENT IS REQUESTING THIS MED BE CALLED INTO HIS LOCAL PHARMACY- [facility]  PT IS COMPLETELY OUT   PLEASE SEND ASAP

## 2021-02-09 ENCOUNTER — OFFICE VISIT (OUTPATIENT)
Dept: INTERNAL MEDICINE CLINIC | Facility: CLINIC | Age: 61
End: 2021-02-09

## 2021-02-09 VITALS
HEART RATE: 68 BPM | WEIGHT: 267.4 LBS | BODY MASS INDEX: 41.97 KG/M2 | DIASTOLIC BLOOD PRESSURE: 76 MMHG | TEMPERATURE: 97.7 F | HEIGHT: 67 IN | SYSTOLIC BLOOD PRESSURE: 153 MMHG | OXYGEN SATURATION: 92 %

## 2021-02-09 DIAGNOSIS — Z12.11 COLON CANCER SCREENING: Primary | ICD-10-CM

## 2021-02-09 DIAGNOSIS — Z13.1 DIABETES MELLITUS SCREENING: ICD-10-CM

## 2021-02-09 DIAGNOSIS — Z11.4 ENCOUNTER FOR SCREENING FOR HIV: ICD-10-CM

## 2021-02-09 DIAGNOSIS — J44.9 CHRONIC OBSTRUCTIVE PULMONARY DISEASE, UNSPECIFIED COPD TYPE (HCC): ICD-10-CM

## 2021-02-09 DIAGNOSIS — J44.9 COPD (CHRONIC OBSTRUCTIVE PULMONARY DISEASE) (HCC): ICD-10-CM

## 2021-02-09 DIAGNOSIS — I10 ESSENTIAL HYPERTENSION: Chronic | ICD-10-CM

## 2021-02-09 DIAGNOSIS — R60.0 LOCALIZED EDEMA: ICD-10-CM

## 2021-02-09 DIAGNOSIS — Z11.59 ENCOUNTER FOR HEPATITIS C SCREENING TEST FOR LOW RISK PATIENT: ICD-10-CM

## 2021-02-09 DIAGNOSIS — Z12.2 ENCOUNTER FOR SCREENING FOR LUNG CANCER: ICD-10-CM

## 2021-02-09 PROCEDURE — 99214 OFFICE O/P EST MOD 30 MIN: CPT | Performed by: INTERNAL MEDICINE

## 2021-02-09 PROCEDURE — 3008F BODY MASS INDEX DOCD: CPT | Performed by: INTERNAL MEDICINE

## 2021-02-09 PROCEDURE — 3725F SCREEN DEPRESSION PERFORMED: CPT | Performed by: INTERNAL MEDICINE

## 2021-02-09 RX ORDER — ALBUTEROL SULFATE 90 UG/1
2 AEROSOL, METERED RESPIRATORY (INHALATION) EVERY 6 HOURS PRN
Qty: 8.5 G | Refills: 2 | Status: SHIPPED | OUTPATIENT
Start: 2021-02-09 | End: 2021-04-19 | Stop reason: SDUPTHER

## 2021-02-09 RX ORDER — FLUTICASONE FUROATE AND VILANTEROL 200; 25 UG/1; UG/1
1 POWDER RESPIRATORY (INHALATION) DAILY
Qty: 60 EACH | Refills: 3 | Status: SHIPPED | OUTPATIENT
Start: 2021-02-09 | End: 2021-04-19 | Stop reason: SDUPTHER

## 2021-02-09 NOTE — PROGRESS NOTES
[facility] Visit Note  Radha Days 56 y o  male   MRN: [id number]    Assessment and Plan      Chronic gout without tophus, unspecified cause, unspecified site  Was previously on Allopurinol   Stable at this time will continue to monitor     Chronic obstructive pulmonary disease, unspecified COPD type [facility])  Patient  Smokes  2 cigars per week he has quit smoking for a few years  He has no formal PFT on file but is on inhalers for COPD  He admits to occasional shortness of breath on exertion with 1 flight of stairs which is chronic, he denies cough pain, chest pain, he has never been intubated in the past for COPD,Not on oxygen at baseline  Patient has bilateral wheezing a in upper middle lung lobes with decreased breath sounds lower lung lobes bilaterally, no JVD although he has bilateral pedal edema to his knees which is likely lymphedema ( he states is unchanged from his baseline) I think his symptoms are more likely COPD less likely CHF  Also obesity likely contributing  Plan   · Increase Breo Ellipta to 200-25 mcg  ·  PFTs ordered  ·  CT lung cancer screen ordered     DAVID continue CPAP     Hypertension blood pressure today is 153/76 patient stated he has not taking his blood pressure medication  He admits to home blood pressure of systolic 349I over 59I  His last BMP was in 2018  He is on losartan 100 mg daily,  Inxezyn79 mg daily and Cardura 4 mg daily he was previously on hydrochlorothiazide which was discontinued   Plan  · Will order repeat BMP today  · Continue home medication losartan 100 mg daily,  Ssxexkm77 mg daily and Cardura 4 mg daily  Follow-up in 5 weeks    Hyperlipidemia continue atorvastatin 40 mg daily    Coronary artery disease s/p post coronary angioplasty 2018 a EF 65% no regional wall motion abnormality  Patient does not look volume overloaded on my exam today    Was  On Lopressor which was discontinued due to decreased heart rate per chart review  Plan  ·  Continue aspirin 81 mg daily, losartan 100 mg daily    PHQ-9 Depression Screening    PHQ-9:   Frequency of the following problems over the past two weeks:      Little interest or pleasure in doing things: 0 - not at all  Feeling down, depressed, or hopeless: 0 - not at all  PHQ-2 Score: 0       BMI Counseling: Body mass index is 41 88 kg/m²  The BMI is above normal  Nutrition recommendations include reducing portion sizes, decreasing overall calorie intake, 3-5 servings of fruits/vegetables daily, reducing fast food intake, consuming healthier snacks, decreasing soda and/or juice intake and moderation in carbohydrate intake  Exercise recommendations include exercising 3-5 times per week  Health Maintenance: Tdap 2016, PCV 03/20/2018, A1c ordered as he is obese and has a family history of  Diabetes and his father, hep C/ HIV ordered, ambulatory referral to gastroenterology for colonoscopy ordered, CT lung cancer screening ordered today    Schedule a follow-up appointment in  5  weeks    Chief Complaint:  Follow-up of chronic medical condition  Subjective     History of Present Illness:   Meryle Byars is a 59 y o  with PMH significant for gouty arthritis, COPD  No documented PFTs, obstructive sleep apnea on CPAP, essential hypertension, hyperlipidemia, coronary artery disease status post coronary angioplasty 2018, EF 65%, no regional wall motion abnormality, and obesity presents to the clinic for follow-up of his chronic medical condition  Patient was last seen in 07/2019 and states that he has been well and did not think he needed to see the doctor  Regarding his blood pressure is patient's blood pressure today on arrival is 153/76 he states he has not taking his blood pressure medication today  He  reports  homeblood pressures  in 130s/80s, his gout is well controlled off medication, he is compliant with his CPAP at night    Today patient has no complaint other than request for refill of his albuterol inhaler  Patient admits to dyspnea on exertion with 1 flight of stairs which she states is chronic, his sleeps with 3 pillows for comfort and states that he can sleep comfortably on 1 pillow, he has no cough has never been intubated for COPD, no fever, no chest pain, no shortness of breath at rest, no palpitation, no lightheadedness/headaches, no abdominal pain no N/ V/C/D  Patient states that he smokes 2 cigars per week and has since quit smoking  He declines the flu shot today        Review of Systems  12 point review of system unremarkable except that listed in my HPI above      Current Outpatient Medications:     albuterol (PROVENTIL HFA,VENTOLIN HFA) 90 mcg/act inhaler, Inhale 2 puffs every 6 (six) hours as needed for wheezing, Disp: 8 5 g, Rfl: 2    amLODIPine (NORVASC) 10 mg tablet, Take 1 tablet (10 mg total) by mouth daily For blood pressure, Disp: 90 tablet, Rfl: 2    aspirin (ECOTRIN LOW STRENGTH) 81 mg EC tablet, TAKE 1 TABLET BY MOUTH EVERY DAY, Disp: 90 tablet, Rfl: 2    atorvastatin (LIPITOR) 40 mg tablet, Take 1 tablet (40 mg total) by mouth daily at bedtime, Disp: 90 tablet, Rfl: 2    [facility] ALLERGY RELIEF 10 MG tablet, , Disp: , Rfl:     doxazosin (CARDURA) 4 mg tablet, Take 1 tablet (4 mg total) by mouth daily, Disp: 90 tablet, Rfl: 2    ipratropium (ATROVENT) 0 02 % nebulizer solution, Take 1 vial (0 5 mg total) by nebulization 3 (three) times a day, Disp: 100 vial, Rfl: 3    losartan (COZAAR) 100 MG tablet, Take 1 tablet (100 mg total) by mouth daily, Disp: 90 tablet, Rfl: 2    fluticasone-vilanterol (BREO ELLIPTA) 200-25 MCG/INH inhaler, Inhale 1 puff daily Rinse mouth after use , Disp: 60 each, Rfl: 3  Past Medical History:   Diagnosis Date    CAD (coronary artery disease)     COPD (chronic obstructive pulmonary disease) (HCC)     Gout     Hyperlipidemia     Hypertension     Orthopnea     last assessed: 8/17/16    Pericardial effusion     Sleep apnea      Past Surgical History:   Procedure Laterality Date    CARDIAC CATHETERIZATION       Social History     Socioeconomic History    Marital status: Single     Spouse name: Not on file    Number of children: 3    Years of education: Not on file    Highest education level: Not on file   Occupational History    Occupation: Unemployed, not looking for work   Social Needs    Financial resource strain: Not on file    Food insecurity     Worry: Not on file     Inability: Not on file   [employer] needs     Medical: Not on file     Non-medical: Not on file   Tobacco Use    Smoking status: Current Some Day Smoker     Packs/day: 0 20     Years: 46 00     Pack years: 9 20     Types: Cigars     Last attempt to quit: 2015     Years since quittin 6    Smokeless tobacco: Never Used    Tobacco comment: 1- 2 cigarsa week   Substance and Sexual Activity    Alcohol use: No     Comment: quit 3 years ago   Drug use: No    Sexual activity: Never     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[employer]     Lifestyle    Physical activity     Days per week: Not on file     Minutes per session: Not on file    Stress: Not on file   Relationships    Social connections     Talks on phone: Not on file     Gets together: Not on file     Attends Zoroastrian service: Not on file     Active member of club or organization: Not on file     Attends meetings of clubs or organizations: Not on file     Relationship status: Not on file    Intimate partner violence     Fear of current or ex partner: Not on file     Emotionally abused: Not on file     Physically abused: Not on file     Forced sexual activity: Not on file   Other Topics Concern    Not on file   Social History Narrative     as per [employer]     Family History   Problem Relation Age of Onset    Diabetes Father     Hypertension Mother      No Known Allergies    Objective     Vitals:    21 0958   BP: 153/76   BP Location: Left arm   Patient Position: Sitting   Cuff Size: Standard   Pulse: 68   Temp: 97 7 °F (36 5 °C)   TempSrc: Temporal   SpO2: 92%   Weight: 121 kg (267 lb 6 4 oz)   Height: 5' 7" (1 702 m)       Physical exam:     GENERAL: NAD, obese   HEENT:  NC/AT, PERRL, EOMI, no scleral icterus  CARDIAC:  RRR, +S1/S2, no S3/S4 heard, no m/g/r  PULMONARY:   Bilateral diffuse wheezing in upper or on middle lung lobes, decreased breath sound bilateral lower lung lobes, non-labored breathing  ABDOMEN:  Soft, NT/ND,no rebound/guarding/rigidity  Extremities:  No edema, cyanosis, or clubbing  NEUROLOGIC: Grossly intact  SKIN:  No rashes or erythema noted on exposed skin  Psych: Normal affect        ==  PLEASE NOTE:  This encounter was completed utilizing the [employer] Direct Speech Voice Recognition Software  Grammatical errors, random word insertions, pronoun errors and incomplete sentences are occasional consequences of the system due to software limitations, ambient noise and hardware issues  These may be missed by proof reading prior to affixing electronic signature  Any questions or concerns about the content, text or information contained within the body of this dictation should be directly addressed to the physician for clarification  Please do not hesitate to call me directly if you have any any questions or concerns

## 2021-02-12 ENCOUNTER — LAB (OUTPATIENT)
Dept: LAB | Facility: CLINIC | Age: 61
End: 2021-02-12
Payer: COMMERCIAL

## 2021-02-12 DIAGNOSIS — I10 ESSENTIAL HYPERTENSION: Chronic | ICD-10-CM

## 2021-02-12 DIAGNOSIS — Z11.4 ENCOUNTER FOR SCREENING FOR HIV: ICD-10-CM

## 2021-02-12 DIAGNOSIS — Z13.1 DIABETES MELLITUS SCREENING: ICD-10-CM

## 2021-02-12 DIAGNOSIS — Z11.59 ENCOUNTER FOR HEPATITIS C SCREENING TEST FOR LOW RISK PATIENT: ICD-10-CM

## 2021-02-12 LAB
ANION GAP SERPL CALCULATED.3IONS-SCNC: 4 MMOL/L (ref 4–13)
BUN SERPL-MCNC: 20 MG/DL (ref 5–25)
CALCIUM SERPL-MCNC: 9.3 MG/DL (ref 8.3–10.1)
CHLORIDE SERPL-SCNC: 107 MMOL/L (ref 100–108)
CO2 SERPL-SCNC: 31 MMOL/L (ref 21–32)
CREAT SERPL-MCNC: 1.23 MG/DL (ref 0.6–1.3)
EST. AVERAGE GLUCOSE BLD GHB EST-MCNC: 120 MG/DL
GFR SERPL CREATININE-BSD FRML MDRD: 63 ML/MIN/1.73SQ M
GLUCOSE P FAST SERPL-MCNC: 107 MG/DL (ref 65–99)
HBA1C MFR BLD: 5.8 %
POTASSIUM SERPL-SCNC: 4.4 MMOL/L (ref 3.5–5.3)
SODIUM SERPL-SCNC: 142 MMOL/L (ref 136–145)

## 2021-02-12 PROCEDURE — 87389 HIV-1 AG W/HIV-1&-2 AB AG IA: CPT

## 2021-02-12 PROCEDURE — 36415 COLL VENOUS BLD VENIPUNCTURE: CPT

## 2021-02-12 PROCEDURE — 86803 HEPATITIS C AB TEST: CPT

## 2021-02-12 PROCEDURE — 80048 BASIC METABOLIC PNL TOTAL CA: CPT

## 2021-02-12 PROCEDURE — 83036 HEMOGLOBIN GLYCOSYLATED A1C: CPT

## 2021-02-13 LAB — HCV AB SER QL: ABNORMAL

## 2021-02-15 LAB — HIV 1+2 AB+HIV1 P24 AG SERPL QL IA: NORMAL

## 2021-03-24 ENCOUNTER — HOSPITAL ENCOUNTER (OUTPATIENT)
Dept: PULMONOLOGY | Facility: HOSPITAL | Age: 61
Discharge: HOME | End: 2021-03-24
Payer: COMMERCIAL

## 2021-03-24 ENCOUNTER — HOSPITAL ENCOUNTER (OUTPATIENT)
Dept: RADIOLOGY | Facility: HOSPITAL | Age: 61
Discharge: HOME | End: 2021-03-24
Payer: COMMERCIAL

## 2021-03-24 DIAGNOSIS — Z12.2 ENCOUNTER FOR SCREENING FOR LUNG CANCER: ICD-10-CM

## 2021-03-24 DIAGNOSIS — J44.9 CHRONIC OBSTRUCTIVE PULMONARY DISEASE, UNSPECIFIED COPD TYPE (HCC): ICD-10-CM

## 2021-03-24 PROCEDURE — 94760 N-INVAS EAR/PLS OXIMETRY 1: CPT

## 2021-03-24 PROCEDURE — 94726 PLETHYSMOGRAPHY LUNG VOLUMES: CPT | Performed by: INTERNAL MEDICINE

## 2021-03-24 PROCEDURE — 94729 DIFFUSING CAPACITY: CPT

## 2021-03-24 PROCEDURE — 94060 EVALUATION OF WHEEZING: CPT | Performed by: INTERNAL MEDICINE

## 2021-03-24 PROCEDURE — 71271 CT THORAX LUNG CANCER SCR C-: CPT

## 2021-03-24 PROCEDURE — 94060 EVALUATION OF WHEEZING: CPT

## 2021-03-24 PROCEDURE — 94729 DIFFUSING CAPACITY: CPT | Performed by: INTERNAL MEDICINE

## 2021-03-24 PROCEDURE — 94726 PLETHYSMOGRAPHY LUNG VOLUMES: CPT

## 2021-03-24 RX ORDER — ALBUTEROL SULFATE 2.5 MG/3ML
2.5 SOLUTION RESPIRATORY (INHALATION) ONCE
Status: COMPLETED | OUTPATIENT
Start: 2021-03-24 | End: 2021-03-24

## 2021-03-24 RX ADMIN — ALBUTEROL SULFATE 2.5 MG: 2.5 SOLUTION RESPIRATORY (INHALATION) at 15:14

## 2021-03-30 ENCOUNTER — IMMUNIZATIONS (OUTPATIENT)
Dept: FAMILY MEDICINE CLINIC | Facility: HOSPITAL | Age: 61
End: 2021-03-30

## 2021-03-30 DIAGNOSIS — Z23 ENCOUNTER FOR IMMUNIZATION: Primary | ICD-10-CM

## 2021-03-30 PROCEDURE — 0001A SARS-COV-2 / COVID-19 MRNA VACCINE (PFIZER-BIONTECH) 30 MCG: CPT

## 2021-03-30 PROCEDURE — 91300 SARS-COV-2 / COVID-19 MRNA VACCINE (PFIZER-BIONTECH) 30 MCG: CPT

## 2021-04-02 DIAGNOSIS — J44.9 CHRONIC OBSTRUCTIVE PULMONARY DISEASE, UNSPECIFIED COPD TYPE (HCC): ICD-10-CM

## 2021-04-18 DIAGNOSIS — J44.9 CHRONIC OBSTRUCTIVE PULMONARY DISEASE, UNSPECIFIED COPD TYPE (HCC): ICD-10-CM

## 2021-04-19 ENCOUNTER — OFFICE VISIT (OUTPATIENT)
Dept: INTERNAL MEDICINE CLINIC | Facility: CLINIC | Age: 61
End: 2021-04-19

## 2021-04-19 VITALS
WEIGHT: 273.4 LBS | OXYGEN SATURATION: 90 % | SYSTOLIC BLOOD PRESSURE: 137 MMHG | TEMPERATURE: 97.1 F | BODY MASS INDEX: 42.91 KG/M2 | HEIGHT: 67 IN | DIASTOLIC BLOOD PRESSURE: 73 MMHG | HEART RATE: 70 BPM

## 2021-04-19 DIAGNOSIS — J44.9 COPD (CHRONIC OBSTRUCTIVE PULMONARY DISEASE) (HCC): ICD-10-CM

## 2021-04-19 DIAGNOSIS — J44.9 CHRONIC OBSTRUCTIVE PULMONARY DISEASE, UNSPECIFIED COPD TYPE (HCC): Primary | ICD-10-CM

## 2021-04-19 DIAGNOSIS — I10 ESSENTIAL HYPERTENSION: Chronic | ICD-10-CM

## 2021-04-19 DIAGNOSIS — I10 HYPERTENSION, UNSPECIFIED TYPE: ICD-10-CM

## 2021-04-19 DIAGNOSIS — E78.5 HYPERLIPIDEMIA, UNSPECIFIED HYPERLIPIDEMIA TYPE: ICD-10-CM

## 2021-04-19 DIAGNOSIS — Z11.59 ENCOUNTER FOR HEPATITIS C SCREENING TEST FOR LOW RISK PATIENT: ICD-10-CM

## 2021-04-19 PROCEDURE — 3008F BODY MASS INDEX DOCD: CPT | Performed by: INTERNAL MEDICINE

## 2021-04-19 PROCEDURE — 3078F DIAST BP <80 MM HG: CPT | Performed by: INTERNAL MEDICINE

## 2021-04-19 PROCEDURE — 99214 OFFICE O/P EST MOD 30 MIN: CPT | Performed by: INTERNAL MEDICINE

## 2021-04-19 PROCEDURE — 3075F SYST BP GE 130 - 139MM HG: CPT | Performed by: INTERNAL MEDICINE

## 2021-04-19 RX ORDER — FLUTICASONE FUROATE AND VILANTEROL 200; 25 UG/1; UG/1
1 POWDER RESPIRATORY (INHALATION) DAILY
Qty: 60 EACH | Refills: 6 | Status: SHIPPED | OUTPATIENT
Start: 2021-04-19 | End: 2021-08-17 | Stop reason: SDUPTHER

## 2021-04-19 RX ORDER — DOXAZOSIN MESYLATE 4 MG/1
4 TABLET ORAL DAILY
Qty: 90 TABLET | Refills: 6 | Status: SHIPPED | OUTPATIENT
Start: 2021-04-19 | End: 2021-08-17 | Stop reason: SDUPTHER

## 2021-04-19 RX ORDER — LOSARTAN POTASSIUM 100 MG/1
100 TABLET ORAL DAILY
Qty: 90 TABLET | Refills: 6 | Status: SHIPPED | OUTPATIENT
Start: 2021-04-19 | End: 2021-08-17 | Stop reason: SDUPTHER

## 2021-04-19 RX ORDER — AMLODIPINE BESYLATE 10 MG/1
10 TABLET ORAL DAILY
Qty: 90 TABLET | Refills: 6 | Status: SHIPPED | OUTPATIENT
Start: 2021-04-19 | End: 2021-05-07 | Stop reason: SINTOL

## 2021-04-19 RX ORDER — ATORVASTATIN CALCIUM 40 MG/1
40 TABLET, FILM COATED ORAL
Qty: 90 TABLET | Refills: 6 | Status: SHIPPED | OUTPATIENT
Start: 2021-04-19 | End: 2021-08-17 | Stop reason: SDUPTHER

## 2021-04-19 RX ORDER — ASPIRIN 81 MG/1
81 TABLET ORAL DAILY
Qty: 90 TABLET | Refills: 6 | Status: SHIPPED | OUTPATIENT
Start: 2021-04-19 | End: 2021-08-17 | Stop reason: SDUPTHER

## 2021-04-19 RX ORDER — ALBUTEROL SULFATE 90 UG/1
2 AEROSOL, METERED RESPIRATORY (INHALATION) EVERY 6 HOURS PRN
Qty: 8.5 G | Refills: 5 | Status: SHIPPED | OUTPATIENT
Start: 2021-04-19 | End: 2021-08-17 | Stop reason: SDUPTHER

## 2021-04-19 NOTE — PROGRESS NOTES
INTERNAL MEDICINE FOLLOW-UP OFFICE VISIT  [facility]  [address] [facility] [place], [address]    NAME: Christiana Reynaga  AGE: 56 y o  SEX: male    DATE OF ENCOUNTER: 4/19/2021    Assessment and Plan     Chronic gout without tophus, unspecified cause, unspecified site  Was previously on Allopurinol   Stable at this time will continue to monitor     Chronic obstructive pulmonary disease, unspecified COPD type [facility])  Patient  Smokes  2 cigars per week he has quit smoking for a few years  PFT concerning for moderate to severe COPD  He admits to occasional shortness of breath on exertion with 1 flight of stairs which is chronic, he denies cough pain, chest pain, he has never been intubated in the past for COPD,Not on oxygen at baseline, no JVD although he has bilateral trace pedal edema to his knees which is likely venous stasis ( he states is unchanged from his baseline)   Plan   · Continue Breo Ellipta to 200-25 mcg  · Ambulatory referral to pulmonology ordered  · Added Spiriva     DAVID stable at this time, admits to compliance with his CPAP machine at night      Hypertension blood pressure today is 137/73, admits to home blood pressure and the 120-130s/80s  He is on losartan 100 mg daily,  [id number] mg daily and Cardura 4 mg daily,was previously on hydrochlorothiazide which was discontinued by previous PCP    Plan  Continue home medication losartan 100 mg daily,  [id number] mg daily and Cardura 4 mg daily       Hyperlipidemia stable  - continue atorvastatin 40 mg daily     Coronary artery disease s/p post coronary angioplasty 2018 a EF 65% no regional wall motion abnormality  Patient does not look volume overloaded on my exam today  Was  On Lopressor which was discontinued due to decreased heart rate per chart review      Plan  - Continue aspirin 81 mg daily, losartan 100 mg daily     Pre diabetes A1c 5 8  I counseled patient on the benefits of diet and exercise and he verbalized understanding    Orders Placed This Encounter   Procedures    Hepatitis C RNA, quantitative, PCR    Ambulatory referral to Pulmonology       - Counseling Documentation: patient was counseled regarding: diagnostic results and importance of compliance with treatment    Chief Complaint     Chief Complaint   Patient presents with    Follow-up       History of Present Illness     HPI Jose Quiles is a 56 y o  with PMH significant for gouty arthritis, COPD  No documented PFTs, obstructive sleep apnea on CPAP, essential hypertension, hyperlipidemia, coronary artery disease status post coronary angioplasty 2018, EF 65%, no regional wall motion abnormality, and obesity presents to the clinic for follow-up of his chronic medical condition  At his last visit his blood pressure was elevated at 153/76 likely secondary to the fact that he did not take his medication prior to presenting to the clinic on that day  Today his blood pressure is 137/73  He admits to compliance with his CPAP at night, he also admits to occasional dyspnea on exertion with 1 flight of stairs which she states is chronic he denies chest pain, palpitation, PND, orthopnea, leg swelling, headaches, lightheadedness  He smokes 2 cigars per day and quit cigarette smoking several years ago  He had a recent hep C antibody tests which was highly reactive and explain to patient that he may have been exposed to hep C at some point and will have to order a hepatitis C virus RNA test and he verbalized understanding  Patient states he was visited by his insurance who gave him FIT test which he has sent back, results pending  He states he feels much better after his [place] was increased to 200-25  He has no complaint at this time  Patient looks stable not in any obvious distress on room air  He will be going for his 2nd dose of COVID vaccine on 04/22        The following portions of the patient's history were reviewed and updated as appropriate: allergies, current medications, past family history, past medical history, past social history, past surgical history and problem list     Review of Systems     Review of Systems 12 point review of system unremarkable he stated that listed in my HPI above  Active Problem List     Patient Active Problem List   Diagnosis    Acute respiratory failure with hypoxia and hypercapnia (HCC)    Hypertension    Tobacco use    DAVID (obstructive sleep apnea)    Respiratory acidosis    Elevated troponin    S/P cardiac cath    CAD (coronary artery disease)    COPD (chronic obstructive pulmonary disease) ([facility] [address] )    Community acquired pneumonia    Gout    Hyperlipidemia    Leukocytosis    Chronic respiratory failure with hypoxia (HCC)    Edema of both lower extremities    Obesity (BMI 30-39  9)    Hypoxia    Periodic limb movements of sleep       Objective     /73 (BP Location: Right arm, Patient Position: Sitting, Cuff Size: Standard)   Pulse 70   Temp (!) 97 1 °F (36 2 °C) (Temporal)   Ht 5' 7" (1 702 m)   Wt 124 kg (273 lb 6 4 oz)   SpO2 90%   BMI 42 82 kg/m²     Physical Exam  Vitals signs reviewed  Constitutional:       Appearance: Normal appearance  He is obese  HENT:      Head: Normocephalic and atraumatic  Nose: Nose normal       Mouth/Throat:      Mouth: Mucous membranes are moist    Eyes:      Extraocular Movements: Extraocular movements intact  Neck:      Musculoskeletal: Normal range of motion and neck supple  Cardiovascular:      Rate and Rhythm: Normal rate and regular rhythm  Pulses: Normal pulses  Heart sounds: Normal heart sounds  Pulmonary:      Effort: Pulmonary effort is normal       Breath sounds: Normal breath sounds  Abdominal:      General: Bowel sounds are normal       Palpations: Abdomen is soft  Musculoskeletal:         General: No swelling  Comments: Trace pedal edema   Skin:     General: Skin is warm        Capillary Refill: Capillary refill takes less than 2 seconds  Neurological:      General: No focal deficit present  Mental Status: He is alert and oriented to person, place, and time  Mental status is at baseline     Psychiatric:         Mood and Affect: Mood normal          Behavior: Behavior normal          Pertinent Laboratory/Diagnostic Studies:  CBC:   Lab Results   Component Value Date/Time    WBC 8 09 10/09/2018 03:59 PM    WBC 10 11 12/08/2015 05:04 PM    RBC 5 10 10/09/2018 03:59 PM    RBC 5 40 12/08/2015 05:04 PM    HGB 15 4 10/09/2018 03:59 PM    HGB 16 4 12/08/2015 05:04 PM    HCT 47 8 10/09/2018 03:59 PM    HCT 49 4 (H) 12/08/2015 05:04 PM    MCV 94 10/09/2018 03:59 PM    MCV 92 12/08/2015 05:04 PM    MCH 30 2 10/09/2018 03:59 PM    MCH 30 4 12/08/2015 05:04 PM    MCHC 32 2 10/09/2018 03:59 PM    MCHC 33 2 12/08/2015 05:04 PM    RDW 12 5 10/09/2018 03:59 PM    RDW 12 4 12/08/2015 05:04 PM    MPV 11 2 10/09/2018 03:59 PM    MPV 11 4 12/08/2015 05:04 PM     10/09/2018 03:59 PM     12/08/2015 05:04 PM    NRBC 0 10/09/2018 03:59 PM    NEUTOPHILPCT 64 10/09/2018 03:59 PM    NEUTOPHILPCT 59 12/08/2015 05:04 PM    LYMPHOPCT 20 10/09/2018 03:59 PM    LYMPHOPCT 25 12/08/2015 05:04 PM    MONOPCT 11 10/09/2018 03:59 PM    MONOPCT 8 12/08/2015 05:04 PM    EOSPCT 4 10/09/2018 03:59 PM    EOSPCT 7 (H) 12/08/2015 05:04 PM    BASOPCT 1 10/09/2018 03:59 PM    BASOPCT 1 12/08/2015 05:04 PM    NEUTROABS 5 27 10/09/2018 03:59 PM    NEUTROABS 5 96 12/08/2015 05:04 PM    LYMPHSABS 1 59 10/09/2018 03:59 PM    LYMPHSABS 2 53 12/08/2015 05:04 PM    MONOSABS 0 87 10/09/2018 03:59 PM    MONOSABS 0 81 12/08/2015 05:04 PM    EOSABS 0 28 10/09/2018 03:59 PM    EOSABS 0 71 (H) 12/08/2015 05:04 PM     Chemistry Profile:   Results from Last 12 Months   Lab Units 02/12/21  0950   POTASSIUM mmol/L 4 4   CHLORIDE mmol/L 107   CO2 mmol/L 31   BUN mg/dL 20   CREATININE mg/dL 1 23   GLUCOSE FASTING mg/dL 107*   CALCIUM mg/dL 9 3   EGFR ml/min/1 73sq m 63 Current Medications     Current Outpatient Medications:     albuterol (PROVENTIL HFA,VENTOLIN HFA) 90 mcg/act inhaler, Inhale 2 puffs every 6 (six) hours as needed for wheezing, Disp: 8 5 g, Rfl: 5    amLODIPine (NORVASC) 10 mg tablet, Take 1 tablet (10 mg total) by mouth daily For blood pressure, Disp: 90 tablet, Rfl: 6    aspirin (ECOTRIN LOW STRENGTH) 81 mg EC tablet, Take 1 tablet (81 mg total) by mouth daily, Disp: 90 tablet, Rfl: 6    atorvastatin (LIPITOR) 40 mg tablet, Take 1 tablet (40 mg total) by mouth daily at bedtime, Disp: 90 tablet, Rfl: 6    [employer] ALLERGY RELIEF 10 MG tablet, , Disp: , Rfl:     doxazosin (CARDURA) 4 mg tablet, Take 1 tablet (4 mg total) by mouth daily, Disp: 90 tablet, Rfl: 6    fluticasone-vilanterol (BREO ELLIPTA) 200-25 MCG/INH inhaler, Inhale 1 puff daily Rinse mouth after use , Disp: 60 each, Rfl: 6    ipratropium (ATROVENT) 0 02 % nebulizer solution, Take 1 vial (0 5 mg total) by nebulization 3 (three) times a day, Disp: 100 vial, Rfl: 3    losartan (COZAAR) 100 MG tablet, Take 1 tablet (100 mg total) by mouth daily, Disp: 90 tablet, Rfl: 6    tiotropium (SPIRIVA RESPIMAT) 2 5 MCG/ACT AERS inhaler, Inhale 2 puffs daily, Disp: 4 g, Rfl: 3    Health Maintenance     Health Maintenance   Topic Date Due    Medicare Annual Wellness Visit (AWV)  Never done    Colorectal Cancer Screening  Never done    Influenza Vaccine (1) 09/01/2020    COVID-19 Vaccine (2 - Pfizer 2-dose series) 04/20/2021    Depression Screening PHQ  02/09/2022    BMI: Followup Plan  02/09/2022    BMI: Adult  04/19/2022    DTaP,Tdap,and Td Vaccines (2 - Td) 03/11/2026    HIV Screening  Completed    Hepatitis C Screening  Completed    Pneumococcal Vaccine: Pediatrics (0 to 5 Years) and At-Risk Patients (6 to 59 Years)  Completed    HIB Vaccine  Aged Out    Hepatitis B Vaccine  Aged Out    IPV Vaccine  Aged Out    Hepatitis A Vaccine  Aged Out    Meningococcal ACWY Vaccine  Aged Out    HPV Vaccine  Aged Out     Immunization History   Administered Date(s) Administered    INFLUENZA 10/09/2009, 11/01/2017    Influenza, seasonal, injectable, preservative free 11/01/2017    Pneumococcal Polysaccharide PPV23 04/18/2018    SARS-CoV-2 / COVID-19 mRNA IM (Pfizer-BioNTech) 03/30/2021    Tdap 03/11/2016     Markel RAO    Internal Medicine PGY-2  4/19/2021 12:24 PM

## 2021-04-22 ENCOUNTER — IMMUNIZATIONS (OUTPATIENT)
Dept: FAMILY MEDICINE CLINIC | Facility: HOSPITAL | Age: 61
End: 2021-04-22

## 2021-04-22 DIAGNOSIS — Z23 ENCOUNTER FOR IMMUNIZATION: Primary | ICD-10-CM

## 2021-04-22 PROCEDURE — 91300 SARS-COV-2 / COVID-19 MRNA VACCINE (PFIZER-BIONTECH) 30 MCG: CPT

## 2021-04-22 PROCEDURE — 0002A SARS-COV-2 / COVID-19 MRNA VACCINE (PFIZER-BIONTECH) 30 MCG: CPT

## 2021-05-06 ENCOUNTER — TELEPHONE (OUTPATIENT)
Dept: INTERNAL MEDICINE CLINIC | Facility: CLINIC | Age: 61
End: 2021-05-06

## 2021-05-06 NOTE — TELEPHONE ENCOUNTER
He needs an appt for evaluation   He has not had this filled since 2018 and it needs to be in person

## 2021-05-06 NOTE — TELEPHONE ENCOUNTER
Patient called in stating his feet are swelling up and needs this medication back on his med list: Furosemide  I do not see it on his active med list  Please review

## 2021-05-07 ENCOUNTER — APPOINTMENT (OUTPATIENT)
Dept: LAB | Facility: CLINIC | Age: 61
End: 2021-05-07
Payer: COMMERCIAL

## 2021-05-07 ENCOUNTER — OFFICE VISIT (OUTPATIENT)
Dept: INTERNAL MEDICINE CLINIC | Facility: CLINIC | Age: 61
End: 2021-05-07

## 2021-05-07 VITALS
TEMPERATURE: 98.1 F | SYSTOLIC BLOOD PRESSURE: 143 MMHG | BODY MASS INDEX: 42.91 KG/M2 | DIASTOLIC BLOOD PRESSURE: 77 MMHG | HEART RATE: 70 BPM | WEIGHT: 274 LBS

## 2021-05-07 DIAGNOSIS — E87.6 DIURETIC-INDUCED HYPOKALEMIA: ICD-10-CM

## 2021-05-07 DIAGNOSIS — M10.9 GOUTY ARTHRITIS: ICD-10-CM

## 2021-05-07 DIAGNOSIS — M79.89 BILATERAL SWELLING OF FEET: Primary | ICD-10-CM

## 2021-05-07 DIAGNOSIS — I50.32 CHRONIC DIASTOLIC HEART FAILURE (HCC): ICD-10-CM

## 2021-05-07 DIAGNOSIS — I25.119 CORONARY ARTERY DISEASE INVOLVING NATIVE HEART WITH ANGINA PECTORIS, UNSPECIFIED VESSEL OR LESION TYPE (HCC): ICD-10-CM

## 2021-05-07 DIAGNOSIS — Z11.59 ENCOUNTER FOR HEPATITIS C SCREENING TEST FOR LOW RISK PATIENT: ICD-10-CM

## 2021-05-07 DIAGNOSIS — T50.2X5A DIURETIC-INDUCED HYPOKALEMIA: ICD-10-CM

## 2021-05-07 DIAGNOSIS — I10 ESSENTIAL HYPERTENSION: ICD-10-CM

## 2021-05-07 DIAGNOSIS — G47.33 OBSTRUCTIVE SLEEP APNEA: ICD-10-CM

## 2021-05-07 LAB
ALBUMIN SERPL BCP-MCNC: 4.1 G/DL (ref 3.5–5)
ALP SERPL-CCNC: 81 U/L (ref 46–116)
ALT SERPL W P-5'-P-CCNC: 26 U/L (ref 12–78)
ANION GAP SERPL CALCULATED.3IONS-SCNC: 1 MMOL/L (ref 4–13)
AST SERPL W P-5'-P-CCNC: 14 U/L (ref 5–45)
BILIRUB SERPL-MCNC: 0.69 MG/DL (ref 0.2–1)
BUN SERPL-MCNC: 14 MG/DL (ref 5–25)
CALCIUM SERPL-MCNC: 9.5 MG/DL (ref 8.3–10.1)
CHLORIDE SERPL-SCNC: 107 MMOL/L (ref 100–108)
CO2 SERPL-SCNC: 32 MMOL/L (ref 21–32)
CREAT SERPL-MCNC: 1.14 MG/DL (ref 0.6–1.3)
GFR SERPL CREATININE-BSD FRML MDRD: 69 ML/MIN/1.73SQ M
GLUCOSE P FAST SERPL-MCNC: 102 MG/DL (ref 65–99)
POTASSIUM SERPL-SCNC: 4.1 MMOL/L (ref 3.5–5.3)
PROT SERPL-MCNC: 7.6 G/DL (ref 6.4–8.2)
SODIUM SERPL-SCNC: 140 MMOL/L (ref 136–145)

## 2021-05-07 PROCEDURE — 36415 COLL VENOUS BLD VENIPUNCTURE: CPT

## 2021-05-07 PROCEDURE — 3078F DIAST BP <80 MM HG: CPT | Performed by: INTERNAL MEDICINE

## 2021-05-07 PROCEDURE — 3077F SYST BP >= 140 MM HG: CPT | Performed by: INTERNAL MEDICINE

## 2021-05-07 PROCEDURE — 87522 HEPATITIS C REVRS TRNSCRPJ: CPT

## 2021-05-07 PROCEDURE — 80053 COMPREHEN METABOLIC PANEL: CPT

## 2021-05-07 PROCEDURE — 99215 OFFICE O/P EST HI 40 MIN: CPT | Performed by: INTERNAL MEDICINE

## 2021-05-07 RX ORDER — POTASSIUM CHLORIDE 20 MEQ/1
20 TABLET, EXTENDED RELEASE ORAL DAILY PRN
Qty: 30 TABLET | Refills: 5 | Status: CANCELLED | OUTPATIENT
Start: 2021-05-07

## 2021-05-07 RX ORDER — CARVEDILOL 3.12 MG/1
3.12 TABLET ORAL 2 TIMES DAILY WITH MEALS
Qty: 180 TABLET | Refills: 3 | Status: SHIPPED | OUTPATIENT
Start: 2021-05-07 | End: 2021-08-17 | Stop reason: SDUPTHER

## 2021-05-07 RX ORDER — FUROSEMIDE 20 MG/1
40 TABLET ORAL DAILY PRN
Qty: 30 TABLET | Refills: 2 | Status: SHIPPED | OUTPATIENT
Start: 2021-05-07 | End: 2021-08-17

## 2021-05-07 NOTE — PROGRESS NOTES
INTERNAL MEDICINE OFFICE VISIT  [facility]  [address] [facility]  [place]     NAME: Brent Zachary  AGE: 56 y o  SEX: male    DATE OF ENCOUNTER: 5/6/2021    Assessment and Plan         1  Bilateral swelling of feet  2  Chronic diastolic heart failure (HCC)  Grade 2 bilateral lower extremity pitting edema,  No JVD appreciable on exam,  Mild  Bibasilar rales in context of chronic lung disease  CHF extirpation versus amlodipine side effect  History of chronic artery disease and Grade I diastolic Heart failure  Echo 04/2018 EF 65% and Grade 1 Diastolic Heart Failure   On Amlodipine 10 mg qd for hypertension  PLAN   - furosemide (LASIX) 20 mg tablet; Take 2 tablets (40 mg total) by mouth daily as needed (leg swelling)  Dispense: 30 tablet; Refill: 2  - Ambulatory referral to Cardiology; Future  -  Discontinued amlodipine  - carvedilol (Coreg) 3 125 mg tablet; Take 1 tablet (3 125 mg total) by mouth 2 (two) times a day with meals  Dispense: 180 tablet; Refill: 3 ;  Heart rate today is 70 denies any chest pain or palpitation,   For note Beta-blocker was discontinued in the past with concern of bradycardia  - Echo complete with contrast if indicated; Future  -  Counseled on low-salt diet; Patient reports noncompliance with low-salt diet in past months     3  Essential hypertension  Continue Losartan 100 mg qd, Norvasc 10, Cardura 4 mg   HCZ was DC in the past d/t hx gout   Lopressor was DC  In the past d/t bradycardia    Discontinue Norvasc given the presentation  With lower extremity edema  - carvedilol (Coreg) 3 125 mg tablet; Take 1 tablet (3 125 mg total) by mouth 2 (two) times a day with meals  Dispense: 180 tablet; Refill: 3    4  Diuretic-induced hypokalemia   potassium levels down to 3 6 - 3 8 in 2018 when taking Lasix, will repeat CMP and monitor on consider potassium supplement if needed  - Comprehensive metabolic panel; Future    5   DAVID   on CPAP at night ,  2 pillows at night,  Baseline denies any changes     · Hx Gouty Arthritis    Off Allopurinol; No flare; Will monitor      · CAD   s/p stent 2014 and angioplasty 2018  Continue Atorvastatin 40 mg and ASA 81 mg daily    cardiology referral and echocardiogram ordered see 1  Assessment and plan    No orders of the defined types were placed in this encounter       - Counseling Documentation: patient was counseled regarding: risks and benefits of treatment options    Chief Complaint    bilateral lower extremity edema    Patient Active Problem List   Diagnosis    Acute respiratory failure with hypoxia and hypercapnia (HCC)    Hypertension    Tobacco use    DAVID (obstructive sleep apnea)    Respiratory acidosis    Elevated troponin    S/P cardiac cath    CAD (coronary artery disease)    COPD (chronic obstructive pulmonary disease) ([facility] [address] )    Community acquired pneumonia    Gout    Hyperlipidemia    Leukocytosis    Chronic respiratory failure with hypoxia (HCC)    Edema of both lower extremities    Obesity (BMI 30-39  9)    Hypoxia    Periodic limb movements of sleep         History of Present Illness     HPI   patient is a 56years old male with past medical history significant for coronary artery disease status post stent ,  Grade 1 diastolic CHF,  Hypertension,  Pre diabetes,  Obstructive sleep apnea on CPAP who presents today with bilateral lower extremity edema  Patient notes his baseline trace edema in his both feet,  Used to take Lasix in 2018,  Endorses noted worsening bilateral lower extremity edema last week for which he says this started taking Lasix 40 mg oral daily which was left over from 2018 with relative improvement but still have  Abnormal  Lower extremity edema for in the feet and up to his knees       patient denies any changes in his breathing, baseline JADE, baseline orthopnea uses 2 pillows at night w CPAP, unchanged, no cough, denies chest pain, palpitation, lightheadness or loss of consciousness  Patient reports non compliance with low salt diet - eating more salty pretzels lately; advised on compliance with low salt diet, discontinue Amlodipine and restarting Carvedilol along with the plan as listed above  Advised to call if concerning / worsening / refractory symptoms including shortness of breath, lightheadedness, chest pain  To follow-up with cardiology and to follow-up with us in 10 weeks or sooner if needed,  Or if experiencing any side effects from the medication changes listed above  The following portions of the patient's history were reviewed and updated as appropriate: allergies, current medications, past family history, past medical history, past social history, past surgical history and problem list     Review of Systems     Review of Systems   review of system times 12 reviewed with the patient and is unremarkable except pertinent positives and negatives noted above HPI  Active Problem List     Patient Active Problem List   Diagnosis    Acute respiratory failure with hypoxia and hypercapnia ([facility] [address] )    Hypertension    Tobacco use    DAVID (obstructive sleep apnea)    Respiratory acidosis    Elevated troponin    S/P cardiac cath    CAD (coronary artery disease)    COPD (chronic obstructive pulmonary disease) ([facility] [address] )    Community acquired pneumonia    Gout    Hyperlipidemia    Leukocytosis    Chronic respiratory failure with hypoxia ([address] )    Edema of both lower extremities    Obesity (BMI 30-39  9)    Hypoxia    Periodic limb movements of sleep       Objective     There were no vitals taken for this visit  Physical Exam  - GEN:  Morbidly obese, Appears well, alert and oriented x 3, pleasant and cooperative, in no acute distress  - HEENT: Anicteric, mucous membranes moist, PERRL and EOMI   - NECK: No lymphadenopathy, JVD or carotid bruits   - HEART:  No JVD appreciated on exam,  No hepato jugular reflux   RRR, normal S1 and S2, no murmurs, clicks, gallops or rubs   - LUNGS:  Bibasilar rales, no wheezing  Not in  Respiratory distress,  Room air    - ABDOMEN: Obese abdomen,  Mildly distended,  No organomegaly or masses felt on exam,  Bowel sounds time 4    - EXTREMITIES:  Bilateral lower extremity edema grade 2 pitting feet and up to knee level, Peripheral pulses normal; no clubbing, or cyanosis  - NEURO: No focal findings, CN II-XII are grossly intact  - Musculoskeletal: 5/5 strength, normal ROM, no swollen or erythematous joints     - SKIN: Normal without suspicious lesions on exposed skin    Pertinent Laboratory/Diagnostic Studies:  CBC:   Lab Results   Component Value Date/Time    WBC 8 09 10/09/2018 03:59 PM    WBC 10 11 12/08/2015 05:04 PM    RBC 5 10 10/09/2018 03:59 PM    RBC 5 40 12/08/2015 05:04 PM    HGB 15 4 10/09/2018 03:59 PM    HGB 16 4 12/08/2015 05:04 PM    HCT 47 8 10/09/2018 03:59 PM    HCT 49 4 (H) 12/08/2015 05:04 PM    MCV 94 10/09/2018 03:59 PM    MCV 92 12/08/2015 05:04 PM    MCH 30 2 10/09/2018 03:59 PM    MCH 30 4 12/08/2015 05:04 PM    MCHC 32 2 10/09/2018 03:59 PM    MCHC 33 2 12/08/2015 05:04 PM    RDW 12 5 10/09/2018 03:59 PM    RDW 12 4 12/08/2015 05:04 PM    MPV 11 2 10/09/2018 03:59 PM    MPV 11 4 12/08/2015 05:04 PM     10/09/2018 03:59 PM     12/08/2015 05:04 PM    NRBC 0 10/09/2018 03:59 PM    NEUTOPHILPCT 64 10/09/2018 03:59 PM    NEUTOPHILPCT 59 12/08/2015 05:04 PM    LYMPHOPCT 20 10/09/2018 03:59 PM    LYMPHOPCT 25 12/08/2015 05:04 PM    MONOPCT 11 10/09/2018 03:59 PM    MONOPCT 8 12/08/2015 05:04 PM    EOSPCT 4 10/09/2018 03:59 PM    EOSPCT 7 (H) 12/08/2015 05:04 PM    BASOPCT 1 10/09/2018 03:59 PM    BASOPCT 1 12/08/2015 05:04 PM    NEUTROABS 5 27 10/09/2018 03:59 PM    NEUTROABS 5 96 12/08/2015 05:04 PM    LYMPHSABS 1 59 10/09/2018 03:59 PM    LYMPHSABS 2 53 12/08/2015 05:04 PM    MONOSABS 0 87 10/09/2018 03:59 PM    MONOSABS 0 81 12/08/2015 05:04 PM    EOSABS 0 28 10/09/2018 03:59 PM    EOSABS 0 71 (H) 12/08/2015 05:04 PM     Chemistry Profile:   Lab Results   Component Value Date/Time     12/08/2015 05:04 PM    K 4 4 02/12/2021 09:50 AM    K 4 2 12/08/2015 05:04 PM     02/12/2021 09:50 AM     12/08/2015 05:04 PM    CO2 31 02/12/2021 09:50 AM    CO2 27 08/04/2016 10:25 AM    ANIONGAP 5 12/08/2015 05:04 PM    BUN 20 02/12/2021 09:50 AM    BUN 16 12/08/2015 05:04 PM    CREATININE 1 23 02/12/2021 09:50 AM    CREATININE 1 21 12/08/2015 05:04 PM    GLUC 89 10/09/2018 03:59 PM    GLUF 107 (H) 02/12/2021 09:50 AM    GLUCOSE 111 08/04/2016 10:25 AM    GLUCOSE 92 12/08/2015 05:04 PM    CALCIUM 9 3 02/12/2021 09:50 AM    CALCIUM 9 0 12/08/2015 05:04 PM    MG 2 1 02/03/2016 02:43 PM    AST 15 05/05/2018 04:37 AM    ALT 19 05/05/2018 04:37 AM    ALKPHOS 48 05/05/2018 04:37 AM    EGFR 63 02/12/2021 09:50 AM    EGFR 56 9 08/04/2016 10:25 AM     Cardiac Studies:   Lab Results   Component Value Date/Time    NTBNP 302 (H) 05/04/2018 10:55 AM     08 (H) 01/21/2016 08:34 PM    TROPONINI <0 02 05/04/2018 10:55 AM    POCTROP 0 01 08/04/2016 11:48 AM    POCTROP 0 02 12/08/2015 04:46 PM         Current Medications     Current Outpatient Medications:     albuterol (PROVENTIL HFA,VENTOLIN HFA) 90 mcg/act inhaler, Inhale 2 puffs every 6 (six) hours as needed for wheezing, Disp: 8 5 g, Rfl: 5    amLODIPine (NORVASC) 10 mg tablet, Take 1 tablet (10 mg total) by mouth daily For blood pressure, Disp: 90 tablet, Rfl: 6    aspirin (ECOTRIN LOW STRENGTH) 81 mg EC tablet, Take 1 tablet (81 mg total) by mouth daily, Disp: 90 tablet, Rfl: 6    atorvastatin (LIPITOR) 40 mg tablet, Take 1 tablet (40 mg total) by mouth daily at bedtime, Disp: 90 tablet, Rfl: 6    [employer] ALLERGY RELIEF 10 MG tablet, , Disp: , Rfl:     doxazosin (CARDURA) 4 mg tablet, Take 1 tablet (4 mg total) by mouth daily, Disp: 90 tablet, Rfl: 6    fluticasone-vilanterol (BREO ELLIPTA) 200-25 MCG/INH inhaler, Inhale 1 puff daily Rinse mouth after use , Disp: 60 each, Rfl: 6    ipratropium (ATROVENT) 0 02 % nebulizer solution, Take 1 vial (0 5 mg total) by nebulization 3 (three) times a day, Disp: 100 vial, Rfl: 3    losartan (COZAAR) 100 MG tablet, Take 1 tablet (100 mg total) by mouth daily, Disp: 90 tablet, Rfl: 6    tiotropium (SPIRIVA RESPIMAT) 2 5 MCG/ACT AERS inhaler, Inhale 2 puffs daily, Disp: 4 g, Rfl: 3    Health Maintenance     Health Maintenance   Topic Date Due    Medicare Annual Wellness Visit (AWV)  Never done    Colorectal Cancer Screening  Never done    Influenza Vaccine (Season Ended) 09/01/2021    Depression Screening PHQ  02/09/2022    BMI: Followup Plan  02/09/2022    BMI: Adult  04/19/2022    DTaP,Tdap,and Td Vaccines (2 - Td) 03/11/2026    HIV Screening  Completed    Hepatitis C Screening  Completed    Pneumococcal Vaccine: Pediatrics (0 to 5 Years) and At-Risk Patients (6 to 59 Years)  Completed    COVID-19 Vaccine  Completed    HIB Vaccine  Aged Out    Hepatitis B Vaccine  Aged Out    IPV Vaccine  Aged Out    Hepatitis A Vaccine  Aged Out    Meningococcal ACWY Vaccine  Aged Out    HPV Vaccine  Aged Out     Immunization History   Administered Date(s) Administered    INFLUENZA 10/09/2009, 11/01/2017    Influenza, seasonal, injectable, preservative free 11/01/2017    Pneumococcal Polysaccharide PPV23 04/18/2018    SARS-CoV-2 / COVID-19 mRNA IM (Pfizer-BioNTech) 03/30/2021, 04/22/2021    Tdap 03/11/2016       Miriam Solano DO  Internal Medicine  PGY-1  5/6/2021 9:20 PM

## 2021-05-10 LAB
HCV RNA SERPL NAA+PROBE-ACNC: NORMAL IU/ML
TEST INFORMATION: NORMAL

## 2021-05-17 NOTE — PROGRESS NOTES
Pulmonary Consultation   Jazmín Easton 56 y o  male MRN: [id number]  5/19/2021      Reason for Consultation:  COPD     Requested by:  Dr Santana Neither and Plan:    Acute on chronic hypoxic respiratory failure secondary to Severe COPD, possible component of diastolic heart failure   - PFTS in 2/2021 showed FEV1/FVC 47%, FEV1 34%, with air trapping  - on Breo Ellipta, Albuterol prn, Spiriva Respimat inhaler  - POC walk test in office showed he needs 2lpm  - referral for pulmonary rehab after lifestyle modifications at next visit   - does have oxygen at home and hasn't used it for quite some time- reordered oxygen and supplies    - continue to use mucinex po bid   - may be a candidate for valve reduction surgery in the future after pulmonary rehab     Chronic diastolic heart failure   - on lasix, Coreg  - baseline weight is 266lbs   - last 2D echo 2018 showed Ef 65%, grade 1 DD  - pending repeat 6/2021 and cardiology follow up afterwards     DAVID on CPAP with significant hypoxia   - has had the same machine 3 yrs - sleep study in 2018 showed severe DAVID with 3lpm oxygen  - compliance report showed 96 7% compliance using it 100% of the days, average AHI 1 8 on CPAP [id number], has a leak of 3 hrs, however, after discussion with patient, he can make it tighter  -may benefit from bipap- will order ABG and then will need re-titration with BIPAP if Co2 elevated     CAD s/p stent and angioplasty   - ASA 81mg     Preventive lung screening   - done in 3/2021- no nodules- repeat in 1 year     Tobacco Abuse   - still smoking - will try nicotine patches, offered wellbutrin- he will try nicotine patches and lozenges I gave him in the office  - counselled for 3 min     1  Chronic obstructive pulmonary disease, unspecified COPD type ([facility] [address] )  -     Ambulatory referral to Pulmonology  -     POCT 6 minute walk  -     Blood gas, arterial; Future  -     Oxygen Supplies    2   Acute and chr resp failure, unsp w hypoxia or hypercapnia ([facility] )  -     Oxygen    3  DAVID (obstructive sleep apnea)    4  Chronic respiratory failure with hypoxia (HCC)    5  Tobacco use          History of Present Illness   HPI:  Mahnaz Rees is a 56 y o  male who has a history of CAD, HTN, CHF, DAVID who was referred for evaluation of COPD  He uses his rescue inhaler three times a day  He denies any cough and never been hospitalized before for his breathing  He first noticed his breathing started getting worse the last 5 yrs  He does feel since then it is getting worse  He does report weight gain and has some room to get better  He used to walk all day before and now he's walking only a block until he has to stop and catch his breath  He continues to smoke 2 cigs/day and usually with coffee in the morning but is able to cut down and stop and knows he has to  He doesn't check his weight every day but will at least twice a week  No chest pain, or significant cough  Review of Systems   Respiratory: Positive for shortness of breath  All other systems reviewed and are negative        Historical Information   Past Medical History:   Diagnosis Date    CAD (coronary artery disease)     COPD (chronic obstructive pulmonary disease) ([facility] )     Gout     Hyperlipidemia     Hypertension     Orthopnea     last assessed: 16    Pericardial effusion     Sleep apnea      Past Surgical History:   Procedure Laterality Date    CARDIAC CATHETERIZATION       Family History   Problem Relation Age of Onset    Diabetes Father     Hypertension Mother        Occupational History: Was a , on disability now     Social History:  Still smoking 2 cigarettes/day   Social History     Tobacco Use   Smoking Status Current Every Day Smoker    Packs/day: 0 20    Years: 46 00    Pack years: 9 20    Types: Cigarettes    Last attempt to quit: 2015    Years since quittin 8   Smokeless Tobacco Never Used   Tobacco Comment    1 cigarette a day       Meds/Allergies Current Outpatient Medications:     albuterol (PROVENTIL HFA,VENTOLIN HFA) 90 mcg/act inhaler, Inhale 2 puffs every 6 (six) hours as needed for wheezing, Disp: 8 5 g, Rfl: 5    aspirin (ECOTRIN LOW STRENGTH) 81 mg EC tablet, Take 1 tablet (81 mg total) by mouth daily, Disp: 90 tablet, Rfl: 6    atorvastatin (LIPITOR) 40 mg tablet, Take 1 tablet (40 mg total) by mouth daily at bedtime, Disp: 90 tablet, Rfl: 6    carvedilol (Coreg) 3 125 mg tablet, Take 1 tablet (3 125 mg total) by mouth 2 (two) times a day with meals, Disp: 180 tablet, Rfl: 3    [employer] ALLERGY RELIEF 10 MG tablet, , Disp: , Rfl:     doxazosin (CARDURA) 4 mg tablet, Take 1 tablet (4 mg total) by mouth daily, Disp: 90 tablet, Rfl: 6    fluticasone-vilanterol (BREO ELLIPTA) 200-25 MCG/INH inhaler, Inhale 1 puff daily Rinse mouth after use , Disp: 60 each, Rfl: 6    furosemide (LASIX) 20 mg tablet, Take 2 tablets (40 mg total) by mouth daily as needed (leg swelling), Disp: 30 tablet, Rfl: 2    losartan (COZAAR) 100 MG tablet, Take 1 tablet (100 mg total) by mouth daily, Disp: 90 tablet, Rfl: 6    tiotropium (SPIRIVA RESPIMAT) 2 5 MCG/ACT AERS inhaler, Inhale 2 puffs daily, Disp: 4 g, Rfl: 3  No Known Allergies    Vitals: Blood pressure 120/78, pulse 71, height 5' 8" (1 727 m), weight 123 kg (271 lb), SpO2 91 % , Body mass index is 41 21 kg/m²  Oxygen Therapy  SpO2: 91 %    Physical Exam  Physical Exam  Constitutional:       Appearance: Normal appearance  He is obese  HENT:      Head: Normocephalic  Nose: Nose normal       Mouth/Throat:      Mouth: Mucous membranes are moist    Eyes:      Pupils: Pupils are equal, round, and reactive to light  Neck:      Musculoskeletal: Normal range of motion  Cardiovascular:      Rate and Rhythm: Normal rate and regular rhythm  Pulses: Normal pulses  Heart sounds: Normal heart sounds     Pulmonary:      Effort: Pulmonary effort is normal       Comments: RUL expiratory wheezing  Abdominal: General: Abdomen is flat  Palpations: Abdomen is soft  Musculoskeletal:      Right lower leg: Edema present  Left lower leg: Edema present  Skin:     General: Skin is warm  Neurological:      General: No focal deficit present  Mental Status: He is alert and oriented to person, place, and time  Labs: I have personally reviewed pertinent lab results  Lab Results   Component Value Date    WBC 8 09 10/09/2018    HGB 15 4 10/09/2018    HCT 47 8 10/09/2018    MCV 94 10/09/2018     10/09/2018     Lab Results   Component Value Date    GLUCOSE 111 2016    CALCIUM 9 5 2021     2015    K 4 1 2021    CO2 32 2021     2021    BUN 14 2021    CREATININE 1 14 2021     No results found for: IGE  Lab Results   Component Value Date    ALT 26 2021    AST 14 2021    ALKPHOS 81 2021       Imaging and other studies: I have personally reviewed pertinent reports  and I have personally reviewed pertinent films in PACS    Pulmonary function testin minute walk test : The patient had a resting oxygen saturation of 87% and HR of 91bpm  He walked a total distance of 285m and lowest saturation 89% and required 2lpm to maintain oxygen to 94%  Highest HR was 100bpm      EKG, Pathology, and Other Studies: I have personally reviewed pertinent reports     and I have personally reviewed pertinent films in PACS      Elizabeth Covarrubias MD  Pulmonary and Critical Care Fellow   [facility]

## 2021-05-19 ENCOUNTER — OFFICE VISIT (OUTPATIENT)
Dept: PULMONOLOGY | Facility: CLINIC | Age: 61
End: 2021-05-19
Payer: COMMERCIAL

## 2021-05-19 VITALS
WEIGHT: 271 LBS | SYSTOLIC BLOOD PRESSURE: 120 MMHG | DIASTOLIC BLOOD PRESSURE: 78 MMHG | OXYGEN SATURATION: 91 % | BODY MASS INDEX: 41.07 KG/M2 | HEART RATE: 71 BPM | HEIGHT: 68 IN

## 2021-05-19 DIAGNOSIS — J96.20 ACUTE AND CHR RESP FAILURE, UNSP W HYPOXIA OR HYPERCAPNIA (HCC): ICD-10-CM

## 2021-05-19 DIAGNOSIS — J96.11 CHRONIC RESPIRATORY FAILURE WITH HYPOXIA (HCC): ICD-10-CM

## 2021-05-19 DIAGNOSIS — J44.9 CHRONIC OBSTRUCTIVE PULMONARY DISEASE, UNSPECIFIED COPD TYPE (HCC): Primary | ICD-10-CM

## 2021-05-19 DIAGNOSIS — G47.33 OSA (OBSTRUCTIVE SLEEP APNEA): Chronic | ICD-10-CM

## 2021-05-19 DIAGNOSIS — Z72.0 TOBACCO USE: Chronic | ICD-10-CM

## 2021-05-19 PROCEDURE — 94618 PULMONARY STRESS TESTING: CPT | Performed by: INTERNAL MEDICINE

## 2021-05-19 PROCEDURE — 99205 OFFICE O/P NEW HI 60 MIN: CPT | Performed by: INTERNAL MEDICINE

## 2021-05-19 PROCEDURE — 3008F BODY MASS INDEX DOCD: CPT | Performed by: INTERNAL MEDICINE

## 2021-06-11 ENCOUNTER — TELEPHONE (OUTPATIENT)
Dept: PULMONOLOGY | Facility: CLINIC | Age: 61
End: 2021-06-11

## 2021-06-11 NOTE — TELEPHONE ENCOUNTER
Patient calling stating he used to get his oxygen through [facility] but they do not take his ins so he needs everything sent to [facility]   Please advise

## 2021-06-13 ENCOUNTER — HOSPITAL ENCOUNTER (EMERGENCY)
Facility: HOSPITAL | Age: 61
Discharge: HOME | End: 2021-06-13
Attending: EMERGENCY MEDICINE | Admitting: EMERGENCY MEDICINE
Payer: COMMERCIAL

## 2021-06-13 ENCOUNTER — APPOINTMENT (EMERGENCY)
Dept: RADIOLOGY | Facility: HOSPITAL | Age: 61
End: 2021-06-13
Payer: COMMERCIAL

## 2021-06-13 ENCOUNTER — APPOINTMENT (EMERGENCY)
Dept: CT IMAGING | Facility: HOSPITAL | Age: 61
End: 2021-06-13
Payer: COMMERCIAL

## 2021-06-13 VITALS
BODY MASS INDEX: 41.16 KG/M2 | DIASTOLIC BLOOD PRESSURE: 109 MMHG | WEIGHT: 270.73 LBS | OXYGEN SATURATION: 96 % | SYSTOLIC BLOOD PRESSURE: 216 MMHG | TEMPERATURE: 98.7 F | HEART RATE: 74 BPM | RESPIRATION RATE: 20 BRPM

## 2021-06-13 DIAGNOSIS — J44.1 COPD WITH ACUTE EXACERBATION (HCC): Primary | ICD-10-CM

## 2021-06-13 LAB
ALBUMIN SERPL BCP-MCNC: 3.9 G/DL (ref 3.5–5)
ALP SERPL-CCNC: 77 U/L (ref 46–116)
ALT SERPL W P-5'-P-CCNC: 27 U/L (ref 12–78)
ANION GAP SERPL CALCULATED.3IONS-SCNC: 5 MMOL/L (ref 4–13)
APTT PPP: 28 SECONDS (ref 23–37)
AST SERPL W P-5'-P-CCNC: 17 U/L (ref 5–45)
ATRIAL RATE: 83 BPM
BASE EX.OXY STD BLDV CALC-SCNC: 61.3 % (ref 60–80)
BASE EXCESS BLDV CALC-SCNC: 3.2 MMOL/L
BASOPHILS # BLD AUTO: 0.05 THOUSANDS/ΜL (ref 0–0.1)
BASOPHILS NFR BLD AUTO: 1 % (ref 0–1)
BILIRUB SERPL-MCNC: 0.62 MG/DL (ref 0.2–1)
BUN SERPL-MCNC: 17 MG/DL (ref 5–25)
CALCIUM SERPL-MCNC: 8.9 MG/DL (ref 8.3–10.1)
CHLORIDE SERPL-SCNC: 105 MMOL/L (ref 100–108)
CO2 SERPL-SCNC: 32 MMOL/L (ref 21–32)
CREAT SERPL-MCNC: 1.23 MG/DL (ref 0.6–1.3)
D DIMER PPP FEU-MCNC: 0.51 UG/ML FEU
EOSINOPHIL # BLD AUTO: 0.4 THOUSAND/ΜL (ref 0–0.61)
EOSINOPHIL NFR BLD AUTO: 5 % (ref 0–6)
ERYTHROCYTE [DISTWIDTH] IN BLOOD BY AUTOMATED COUNT: 12.9 % (ref 11.6–15.1)
GFR SERPL CREATININE-BSD FRML MDRD: 63 ML/MIN/1.73SQ M
GLUCOSE SERPL-MCNC: 129 MG/DL (ref 65–140)
HCO3 BLDV-SCNC: 33.1 MMOL/L (ref 24–30)
HCT VFR BLD AUTO: 49.1 % (ref 36.5–49.3)
HGB BLD-MCNC: 15.6 G/DL (ref 12–17)
IMM GRANULOCYTES # BLD AUTO: 0.04 THOUSAND/UL (ref 0–0.2)
IMM GRANULOCYTES NFR BLD AUTO: 1 % (ref 0–2)
INR PPP: 1.07 (ref 0.84–1.19)
LACTATE SERPL-SCNC: 1.1 MMOL/L (ref 0.5–2)
LYMPHOCYTES # BLD AUTO: 1.31 THOUSANDS/ΜL (ref 0.6–4.47)
LYMPHOCYTES NFR BLD AUTO: 17 % (ref 14–44)
MCH RBC QN AUTO: 29.3 PG (ref 26.8–34.3)
MCHC RBC AUTO-ENTMCNC: 31.8 G/DL (ref 31.4–37.4)
MCV RBC AUTO: 92 FL (ref 82–98)
MONOCYTES # BLD AUTO: 0.56 THOUSAND/ΜL (ref 0.17–1.22)
MONOCYTES NFR BLD AUTO: 7 % (ref 4–12)
NEUTROPHILS # BLD AUTO: 5.53 THOUSANDS/ΜL (ref 1.85–7.62)
NEUTS SEG NFR BLD AUTO: 69 % (ref 43–75)
NRBC BLD AUTO-RTO: 0 /100 WBCS
NT-PROBNP SERPL-MCNC: 125 PG/ML
O2 CT BLDV-SCNC: 14.4 ML/DL
P AXIS: 44 DEGREES
PCO2 BLDV: 73.4 MM HG (ref 42–50)
PH BLDV: 7.27 [PH] (ref 7.3–7.4)
PLATELET # BLD AUTO: 177 THOUSANDS/UL (ref 149–390)
PMV BLD AUTO: 11.1 FL (ref 8.9–12.7)
PO2 BLDV: 35 MM HG (ref 35–45)
POTASSIUM SERPL-SCNC: 4 MMOL/L (ref 3.5–5.3)
PROT SERPL-MCNC: 7.2 G/DL (ref 6.4–8.2)
PROTHROMBIN TIME: 14 SECONDS (ref 11.6–14.5)
QRS AXIS: 5 DEGREES
QRSD INTERVAL: 106 MS
QT INTERVAL: 358 MS
QTC INTERVAL: 402 MS
RBC # BLD AUTO: 5.32 MILLION/UL (ref 3.88–5.62)
SODIUM SERPL-SCNC: 142 MMOL/L (ref 136–145)
T WAVE AXIS: 83 DEGREES
TROPONIN I SERPL-MCNC: <0.02 NG/ML
VENTRICULAR RATE: 76 BPM
WBC # BLD AUTO: 7.89 THOUSAND/UL (ref 4.31–10.16)

## 2021-06-13 PROCEDURE — 93010 ELECTROCARDIOGRAM REPORT: CPT | Performed by: INTERNAL MEDICINE

## 2021-06-13 PROCEDURE — 99285 EMERGENCY DEPT VISIT HI MDM: CPT

## 2021-06-13 PROCEDURE — 84484 ASSAY OF TROPONIN QUANT: CPT | Performed by: EMERGENCY MEDICINE

## 2021-06-13 PROCEDURE — 94640 AIRWAY INHALATION TREATMENT: CPT

## 2021-06-13 PROCEDURE — 83880 ASSAY OF NATRIURETIC PEPTIDE: CPT | Performed by: EMERGENCY MEDICINE

## 2021-06-13 PROCEDURE — 71045 X-RAY EXAM CHEST 1 VIEW: CPT

## 2021-06-13 PROCEDURE — 96360 HYDRATION IV INFUSION INIT: CPT

## 2021-06-13 PROCEDURE — 93005 ELECTROCARDIOGRAM TRACING: CPT

## 2021-06-13 PROCEDURE — 85610 PROTHROMBIN TIME: CPT | Performed by: EMERGENCY MEDICINE

## 2021-06-13 PROCEDURE — 36415 COLL VENOUS BLD VENIPUNCTURE: CPT | Performed by: EMERGENCY MEDICINE

## 2021-06-13 PROCEDURE — 99284 EMERGENCY DEPT VISIT MOD MDM: CPT | Performed by: EMERGENCY MEDICINE

## 2021-06-13 PROCEDURE — 85730 THROMBOPLASTIN TIME PARTIAL: CPT | Performed by: EMERGENCY MEDICINE

## 2021-06-13 PROCEDURE — 83605 ASSAY OF LACTIC ACID: CPT | Performed by: EMERGENCY MEDICINE

## 2021-06-13 PROCEDURE — 85025 COMPLETE CBC W/AUTO DIFF WBC: CPT | Performed by: EMERGENCY MEDICINE

## 2021-06-13 PROCEDURE — 82805 BLOOD GASES W/O2 SATURATION: CPT | Performed by: EMERGENCY MEDICINE

## 2021-06-13 PROCEDURE — 87040 BLOOD CULTURE FOR BACTERIA: CPT | Performed by: EMERGENCY MEDICINE

## 2021-06-13 PROCEDURE — 71275 CT ANGIOGRAPHY CHEST: CPT

## 2021-06-13 PROCEDURE — 85379 FIBRIN DEGRADATION QUANT: CPT | Performed by: EMERGENCY MEDICINE

## 2021-06-13 PROCEDURE — 80053 COMPREHEN METABOLIC PANEL: CPT | Performed by: EMERGENCY MEDICINE

## 2021-06-13 PROCEDURE — G1004 CDSM NDSC: HCPCS

## 2021-06-13 RX ORDER — PREDNISONE 10 MG/1
TABLET ORAL
Qty: 30 TABLET | Refills: 0 | Status: SHIPPED | OUTPATIENT
Start: 2021-06-14 | End: 2021-08-17 | Stop reason: ALTCHOICE

## 2021-06-13 RX ORDER — IPRATROPIUM BROMIDE AND ALBUTEROL SULFATE .5; 3 MG/3ML; MG/3ML
1 SOLUTION RESPIRATORY (INHALATION) ONCE
Status: DISCONTINUED | OUTPATIENT
Start: 2021-06-13 | End: 2021-06-13

## 2021-06-13 RX ORDER — ALBUTEROL SULFATE 2.5 MG/3ML
1 SOLUTION RESPIRATORY (INHALATION) ONCE
Status: DISCONTINUED | OUTPATIENT
Start: 2021-06-13 | End: 2021-06-13

## 2021-06-13 RX ORDER — AZITHROMYCIN 250 MG/1
TABLET, FILM COATED ORAL
Qty: 6 TABLET | Refills: 0 | Status: SHIPPED | OUTPATIENT
Start: 2021-06-14 | End: 2021-06-17

## 2021-06-13 RX ORDER — METHYLPREDNISOLONE SOD SUCC 125 MG
1 VIAL (EA) INJECTION ONCE
Status: DISCONTINUED | OUTPATIENT
Start: 2021-06-13 | End: 2021-06-13

## 2021-06-13 RX ORDER — ALBUTEROL SULFATE 2.5 MG/3ML
1 SOLUTION RESPIRATORY (INHALATION) ONCE
Status: COMPLETED | OUTPATIENT
Start: 2021-06-13 | End: 2021-06-13

## 2021-06-13 RX ORDER — ALBUTEROL SULFATE 2.5 MG/3ML
5 SOLUTION RESPIRATORY (INHALATION) ONCE
Status: COMPLETED | OUTPATIENT
Start: 2021-06-13 | End: 2021-06-13

## 2021-06-13 RX ORDER — METHYLPREDNISOLONE SOD SUCC 125 MG
1 VIAL (EA) INJECTION ONCE
Status: COMPLETED | OUTPATIENT
Start: 2021-06-13 | End: 2021-06-13

## 2021-06-13 RX ORDER — IPRATROPIUM BROMIDE AND ALBUTEROL SULFATE .5; 3 MG/3ML; MG/3ML
1 SOLUTION RESPIRATORY (INHALATION) ONCE
Status: COMPLETED | OUTPATIENT
Start: 2021-06-13 | End: 2021-06-13

## 2021-06-13 RX ORDER — AZITHROMYCIN 250 MG/1
500 TABLET, FILM COATED ORAL ONCE
Status: COMPLETED | OUTPATIENT
Start: 2021-06-13 | End: 2021-06-13

## 2021-06-13 RX ADMIN — SODIUM CHLORIDE 500 ML: 0.9 INJECTION, SOLUTION INTRAVENOUS at 10:07

## 2021-06-13 RX ADMIN — ALBUTEROL SULFATE: 2.5 SOLUTION RESPIRATORY (INHALATION) at 07:08

## 2021-06-13 RX ADMIN — AZITHROMYCIN MONOHYDRATE 500 MG: 250 TABLET ORAL at 09:10

## 2021-06-13 RX ADMIN — NITROGLYCERIN 0.5 INCH: 20 OINTMENT TOPICAL at 07:07

## 2021-06-13 RX ADMIN — IPRATROPIUM BROMIDE: 0.5 SOLUTION RESPIRATORY (INHALATION) at 07:09

## 2021-06-13 RX ADMIN — ALBUTEROL SULFATE 5 MG: 2.5 SOLUTION RESPIRATORY (INHALATION) at 07:07

## 2021-06-13 RX ADMIN — IOHEXOL 100 ML: 350 INJECTION, SOLUTION INTRAVENOUS at 10:38

## 2021-06-13 NOTE — ED NOTES
Received report from Abdi Che, [address] [place] [address] Burt Henning RN  06/13/21 9150

## 2021-06-13 NOTE — ED CARE HANDOFF
Emergency Department Sign Out Note        Sign out and transfer of care from Dr Clayton Schaeffer   See Separate Emergency Department note  The patient, Christian Dalton, was evaluated by the previous provider for SOB      Workup Completed:  Labs, neb treatments, EKG    ED Course / Workup Pending (followup):  CTA chest r/o PE pending then admission of COPD though patient plans to sign out AMA      HEART Risk Score      Most Recent Value   Heart Score Risk Calculator   History  0 Filed at: 06/13/2021 0814   ECG  0 Filed at: 06/13/2021 0949   Age  1 Filed at: 06/13/2021 0814   Risk Factors  2 Filed at: 06/13/2021 0814   Troponin  0 Filed at: 06/13/2021 5888   HEART Score  3 Filed at: 06/13/2021 0814            PERC Rule for PE      Most Recent Value   PERC Rule for PE   Age >=50  1 Filed at: 06/13/2021 0955   HR >=100  --   O2 Sat on room air < 95%  --   History of PE or DVT  --   Recent trauma or surgery  --   Hemoptysis  --   Exogenous estrogen  --   Unilateral leg swelling  --   PERC Rule for PE Results  1 Filed at: 06/13/2021 8830              Wells' Criteria for PE      Most Recent Value   Wells' Criteria for PE   Clinical signs and symptoms of DVT  0 Filed at: 06/13/2021 6594   PE is primary diagnosis or equally likely  3 Filed at: 06/13/2021 0956   HR >100  0 Filed at: 06/13/2021 0956   Immobilization at least 3 days or Surgery in the previous 4 weeks  0 Filed at: 06/13/2021 0956   Previous, objectively diagnosed PE or DVT  0 Filed at: 06/13/2021 0956   Hemoptysis  0 Filed at: 06/13/2021 5617   Malignancy with treatment within 6 months or palliative  0 Filed at: 06/13/2021 5307   Wells' Criteria Total  3 Filed at: 06/13/2021 9225                       Procedures  MDM    Disposition  Final diagnoses:   COPD with acute exacerbation [facility])     Time reflects when diagnosis was documented in both MDM as applicable and the Disposition within this note     Time User Action Codes Description Comment    6/13/2021  8:25 AM Clayton Schaeffer, Ericka Mcginnis [J44 1] COPD with acute exacerbation [facility])       ED Disposition     ED Disposition Condition Date/Time Comment    [facility] Jun 13, 2021 0701 Date: 6/13/2021  Patient: Jose Dickens  Admitted: 6/13/2021  6:27 AM  Attending Provider: Magdalena Rinaldi MD    Jose Dickens or his authorized caregiver has made the decision for the patient to leave the emergency department against the advice of  the emergency department staff  He or his authorized caregiver has been informed and understands the inherent risks, including death, worsening difficulty breathing, blood clot in lung, permanent disability  He or his authorized caregiver has decid ed to accept the responsibility for this decision  Jose Dickens and all necessary parties have been advised that he may return for further evaluation or treatment  His condition at time of discharge was fair  Jose Dickens had current vital signs as  follows:  /89 (BP Location: Right arm)   Pulse 74   Temp 98 7 °F (37 1 °C) (Oral)   Resp 20   Wt 123 kg (270 lb 11 6 oz)         Follow-up Information    None       Patient's Medications   Discharge Prescriptions    No medications on file     No discharge procedures on file         ED Provider  Electronically Signed by     Rupert Nuno DO  06/13/21 1123

## 2021-06-13 NOTE — ED PROVIDER NOTES
History  Chief Complaint   Patient presents with    Shortness of Breath     Patient arrived via EMS c/o sob x 2wks  Patient has a hx of COPD and wears O2 at home  EMS gave patient 1 duoneb, 1 albuterol, and 125mg solu medrol  Blood glucose 130  Per EMS patient hypertensive systolic in 554D     Patient is a 56year old male with worsening sob for past 2 weeks but worst this AM  (+) cough  No fever  No N/V  No chest pain  No travel  Got duonebs in the field and IV solumedrol  Has h/o COPD and is on home oxygen  Was last seen at [facility] ED on 10/9/18 for left leg pain  [facility] website checked on this patient and no Rx found  States he was placed on different HTN meds recently and may have not been compliant with how he is supposed to take this medication  BP was very high in the ambulance  History provided by:  Patient and EMS personnel   used: No    Shortness of Breath  Associated symptoms: cough and wheezing    Associated symptoms: no chest pain, no fever and no vomiting        Prior to Admission Medications   Prescriptions Last Dose Informant Patient Reported? Taking?    [facility] ALLERGY RELIEF 10 MG tablet 6/12/2021 at Unknown time  Yes Yes   albuterol (PROVENTIL HFA,VENTOLIN HFA) 90 mcg/act inhaler 6/12/2021 at Unknown time  No Yes   Sig: Inhale 2 puffs every 6 (six) hours as needed for wheezing   aspirin (ECOTRIN LOW STRENGTH) 81 mg EC tablet 6/12/2021 at Unknown time  No Yes   Sig: Take 1 tablet (81 mg total) by mouth daily   atorvastatin (LIPITOR) 40 mg tablet 6/12/2021 at Unknown time  No Yes   Sig: Take 1 tablet (40 mg total) by mouth daily at bedtime   carvedilol (Coreg) 3 125 mg tablet 6/12/2021 at Unknown time  No Yes   Sig: Take 1 tablet (3 125 mg total) by mouth 2 (two) times a day with meals   doxazosin (CARDURA) 4 mg tablet 6/12/2021 at Unknown time  No Yes   Sig: Take 1 tablet (4 mg total) by mouth daily   fluticasone-vilanterol (BREO ELLIPTA) 200-25 MCG/INH inhaler 6/12/2021 at Unknown time  No Yes   Sig: Inhale 1 puff daily Rinse mouth after use  furosemide (LASIX) 20 mg tablet 2021 at Unknown time  No Yes   Sig: Take 2 tablets (40 mg total) by mouth daily as needed (leg swelling)   losartan (COZAAR) 100 MG tablet 2021 at Unknown time  No Yes   Sig: Take 1 tablet (100 mg total) by mouth daily   tiotropium (SPIRIVA RESPIMAT) 2 5 MCG/ACT AERS inhaler 2021 at Unknown time  No Yes   Sig: Inhale 2 puffs daily      Facility-Administered Medications: None       Past Medical History:   Diagnosis Date    CAD (coronary artery disease)     COPD [facility]chronic obstructive pulmonary disease) ([facility] [address] )     Gout     Hyperlipidemia     Hypertension     Orthopnea     last assessed: 16    Pericardial effusion     Sleep apnea        Past Surgical History:   Procedure Laterality Date    CARDIAC CATHETERIZATION         Family History   Problem Relation Age of Onset    Diabetes Father     Hypertension Mother      I have reviewed and agree with the history as documented  E-Cigarette/Vaping    E-Cigarette Use Never User      E-Cigarette/Vaping Substances    Nicotine No     THC No     CBD No     Flavoring No     Other No     Unknown No      Social History     Tobacco Use    Smoking status: Current Every Day Smoker     Packs/day: 0 20     Years: 46 00     Pack years: 9 20     Types: Cigarettes     Last attempt to quit: 2015     Years since quittin 9    Smokeless tobacco: Never Used    Tobacco comment: 1 cigarette a day   Vaping Use    Vaping Use: Never used   Substance Use Topics    Alcohol use: No     Comment: quit 3 years ago   Drug use: No       Review of Systems   Constitutional: Negative for fever  Respiratory: Positive for cough, shortness of breath and wheezing  Cardiovascular: Negative for chest pain  Gastrointestinal: Negative for nausea and vomiting  All other systems reviewed and are negative        Physical Exam  Physical Exam  Vitals and nursing note reviewed  Constitutional:       General: He is in acute distress (moderate)  HENT:      Head: Normocephalic and atraumatic  Mouth/Throat:      Mouth: Mucous membranes are moist    Eyes:      General: No scleral icterus  Cardiovascular:      Rate and Rhythm: Normal rate and regular rhythm  Heart sounds: Normal heart sounds  No murmur heard  Pulmonary:      Effort: Respiratory distress present  Breath sounds: No stridor  Wheezing present  No rhonchi or rales  Abdominal:      General: Bowel sounds are normal       Palpations: Abdomen is soft  Tenderness: There is no abdominal tenderness  Musculoskeletal:         General: No deformity  Cervical back: Normal range of motion and neck supple  Right lower leg: No edema  Left lower leg: No edema  Skin:     General: Skin is warm and dry  Findings: No erythema or rash  Neurological:      General: No focal deficit present  Mental Status: He is alert and oriented to person, place, and time     Psychiatric:         Mood and Affect: Mood normal          Vital Signs  ED Triage Vitals   Temperature Pulse Respirations Blood Pressure SpO2   06/13/21 0632 06/13/21 0632 06/13/21 0632 06/13/21 0708 06/13/21 0632   98 7 °F (37 1 °C) 89 20 (!) 181/102 96 %      Temp Source Heart Rate Source Patient Position - Orthostatic VS BP Location FiO2 (%)   06/13/21 0632 06/13/21 0632 06/13/21 0632 06/13/21 0632 --   Oral Monitor Lying Right arm       Pain Score       --                  Vitals:    06/13/21 0632 06/13/21 0708 06/13/21 0745 06/13/21 1009   BP:  (!) 181/102 146/89 (!) 216/109   Pulse: 89  74 74   Patient Position - Orthostatic VS: Lying  Sitting Lying         Visual Acuity  Visual Acuity      Most Recent Value   L Pupil Size (mm)  3   R Pupil Size (mm)  3          ED Medications  Medications   sodium chloride 0 9 % bolus 500 mL (500 mL Intravenous New Bag 6/13/21 1007)   albuterol (5 mg/mL) 0 5 % inhalation solution **ADS Override Pull** (  Given 6/13/21 0708)   albuterol (FOR EMS ONLY) (2 5 mg/3 mL) 0 083 % inhalation solution 2 5 mg (0 mg Does not apply Given to EMS 6/13/21 0652)   ipratropium-albuterol (FOR EMS ONLY) (DUO-NEB) 0 5-2 5 mg/3 mL inhalation solution 3 mL (0 mL Does not apply Given to EMS 6/13/21 0652)   methylPREDNISolone sodium succinate (FOR EMS ONLY) (Solu-MEDROL) 125 MG injection 125 mg (0 mg Does not apply Given to EMS 6/13/21 0652)   ipratropium (ATROVENT) 0 02 % inhalation solution **ADS Override Pull** (  Given by Other 6/13/21 0709)   albuterol inhalation solution 5 mg (5 mg Nebulization Given 6/13/21 0707)   nitroglycerin (NITRO-BID) 2 % TD ointment 0 5 inch (0 5 inches Topical Given 6/13/21 0707)   azithromycin (ZITHROMAX) tablet 500 mg (500 mg Oral Given 6/13/21 0910)   iohexol (OMNIPAQUE) 350 MG/ML injection (SINGLE-DOSE) 100 mL (100 mL Intravenous Given 6/13/21 1038)       Diagnostic Studies  Results Reviewed     Procedure Component Value Units Date/Time    D-Dimer [[id number]]  (Abnormal) Collected: 06/13/21 0708    Lab Status: Final result Specimen: Blood from Arm, Left Updated: 06/13/21 0951     D-Dimer, Quant 0 51 ug/ml FEU     Protime-INR [[id number]]  (Normal) Collected: 06/13/21 0708    Lab Status: Final result Specimen: Blood from Arm, Left Updated: 06/13/21 0945     Protime 14 0 seconds      INR 1 07    APTT [[id number]]  (Normal) Collected: 06/13/21 0708    Lab Status: Final result Specimen: Blood from Arm, Left Updated: 06/13/21 0945     PTT 28 seconds     Comprehensive metabolic panel [[id number]] Collected: 06/13/21 0708    Lab Status: Final result Specimen: Blood from Arm, Left Updated: 06/13/21 0815     Sodium 142 mmol/L      Potassium 4 0 mmol/L      Chloride 105 mmol/L      CO2 32 mmol/L      ANION GAP 5 mmol/L      BUN 17 mg/dL      Creatinine 1 23 mg/dL      Glucose 129 mg/dL      Calcium 8 9 mg/dL      AST 17 U/L      ALT 27 U/L      Alkaline Phosphatase 77 U/L      Total Protein 7 2 g/dL      Albumin 3 9 g/dL      Total Bilirubin 0 62 mg/dL      eGFR 63 ml/min/1 73sq m     Narrative:      [place] guidelines for Chronic Kidney Disease (CKD):     Stage 1 with normal or high GFR (GFR > 90 mL/min/1 73 square meters)    Stage 2 Mild CKD (GFR = 60-89 mL/min/1 73 square meters)    Stage 3A Moderate CKD (GFR = 45-59 mL/min/1 73 square meters)    Stage 3B Moderate CKD (GFR = 30-44 mL/min/1 73 square meters)    Stage 4 Severe CKD (GFR = 15-29 mL/min/1 73 square meters)    Stage 5 End Stage CKD (GFR <15 mL/min/1 73 square meters)  Note: GFR calculation is accurate only with a steady state creatinine    NT-BNP PRO [[id number]]  (Abnormal) Collected: 06/13/21 0708    Lab Status: Final result Specimen: Blood from Arm, Left Updated: 06/13/21 0815     NT-proBNP 125 pg/mL     Lactic acid [[id number]]  (Normal) Collected: 06/13/21 0732    Lab Status: Final result Specimen: Blood from Arm, Right Updated: 06/13/21 0073     LACTIC ACID 1 1 mmol/L     Narrative:      Result may be elevated if tourniquet was used during collection      Troponin I [[id number]]  (Normal) Collected: 06/13/21 0708    Lab Status: Final result Specimen: Blood from Arm, Left Updated: 06/13/21 0808     Troponin I <0 02 ng/mL     CBC and differential [[id number]] Collected: 06/13/21 0732    Lab Status: Final result Specimen: Blood from Arm, Right Updated: 06/13/21 0741     WBC 7 89 Thousand/uL      RBC 5 32 Million/uL      Hemoglobin 15 6 g/dL      Hematocrit 49 1 %      MCV 92 fL      MCH 29 3 pg      MCHC 31 8 g/dL      RDW 12 9 %      MPV 11 1 fL      Platelets 642 Thousands/uL      nRBC 0 /100 WBCs      Neutrophils Relative 69 %      Immat GRANS % 1 %      Lymphocytes Relative 17 %      Monocytes Relative 7 %      Eosinophils Relative 5 %      Basophils Relative 1 %      Neutrophils Absolute 5 53 Thousands/µL      Immature Grans Absolute 0 04 Thousand/uL      Lymphocytes Absolute 1 31 Thousands/µL Monocytes Absolute 0 56 Thousand/µL      Eosinophils Absolute 0 40 Thousand/µL      Basophils Absolute 0 05 Thousands/µL     Blood gas, venous [[id number]]  (Abnormal) Collected: 06/13/21 0708    Lab Status: Final result Specimen: Blood from Arm, Left Updated: 06/13/21 0741     pH, Kj 7 272     pCO2, Kj 73 4 mm Hg      pO2, Kj 35 0 mm Hg      HCO3, Kj 33 1 mmol/L      Base Excess, Kj 3 2 mmol/L      O2 Content, Kj 14 4 ml/dL      O2 HGB, VENOUS 61 3 %     Blood culture #1 [[id number]] Collected: 06/13/21 0720    Lab Status: In process Specimen: Blood from Arm, Right Updated: 06/13/21 0738    Blood culture #2 [[id number]] Collected: 06/13/21 0708    Lab Status: In process Specimen: Blood from Arm, Left Updated: 06/13/21 0738                 XR chest 1 view portable   ED Interpretation by Lynda Barber MD (06/13 7286)   No acute disease read by me  CTA ED chest PE study    (Results Pending)              Procedures  ECG 12 Lead Documentation Only    Date/Time: 6/13/2021 8:12 AM  Performed by: Lynda Barber MD  Authorized by: Lynda Barber MD     Indications / Diagnosis:  Sob  ECG reviewed by me, the ED Provider: yes    Patient location:  ED  Previous ECG:     Previous ECG:  Compared to current    Comparison ECG info:  5/4/18    Similarity:  No change  Quality:     Tracing quality:  Limited by artifact  Rate:     ECG rate:  76    ECG rate assessment: normal    Rhythm:     Rhythm: sinus rhythm    Ectopy:     Ectopy: none    QRS:     QRS axis:  Normal    QRS intervals:  Normal  Conduction:     Conduction: normal    ST segments:     ST segments:  Normal  T waves:     T waves: normal    Q waves:     Q waves:  V1 and V2  Comments:      I do not agree with computer reading of AV dissociation with acccelerated junctional rhythm                ED Course  ED Course as of Jun 13 1050   Sun Jun 13, 2021   0819 Labs and CXR d/w patient and patient wants to go home and does not want to wait for D-dimer and states he does not care if has a blood clot in his lung  8876 Patient is signing out [place]  Patient has the capacity to understand the consequences of signing out AMA including worsening breathing difficulty, blood clot in lung, permanent disability, and death  Patient given the opportunity to ask questions and was instructed to return to the Emergency Department at any time if worse in anyway or not improved  5202 Patient now will wait for D-dimer to check for blood clot in lung and did not sign out AMA at this time  7801 CT PE study ordered  D-Dimer, Quant(!): 0 51   [id number] Signed out to    [facility] AM to check CT PE study and if negative then patient can sign out AMA and will need Rx for zithromax for 4 more days and prednisone Rx for COPD exacerbation  HEART Risk Score      Most Recent Value   Heart Score Risk Calculator   History  0 Filed at: 06/13/2021 0814   ECG  0 Filed at: 06/13/2021 5354   Age  1 Filed at: 06/13/2021 8212   Risk Factors  2 Filed at: 06/13/2021 0814   Troponin  0 Filed at: 06/13/2021 3271   HEART Score  3 Filed at: 06/13/2021 1837              PERC Rule for PE      Most Recent Value   PERC Rule for PE   Age >=50  1 Filed at: 06/13/2021 0955   HR >=100  --   O2 Sat on room air < 95%  --   History of PE or DVT  --   Recent trauma or surgery  --   Hemoptysis  --   Exogenous estrogen  --   Unilateral leg swelling  --   PERC Rule for PE Results  1 Filed at: 06/13/2021 8197          Initial Sepsis Screening     Row Name 06/13/21 0818                Is the patient's history suggestive of a new or worsening infection? (!) Yes (Proceed)  -AO        Suspected source of infection  pneumonia  -AO        Are two or more of the following signs & symptoms of infection both present and new to the patient?   No  -AO        Indicate SIRS criteria  --        If the answer is yes to both questions, suspicion of sepsis is present  --        If severe sepsis is present AND tissue hypoperfusion perists in the hour after fluid resuscitation or lactate > 4, the patient meets criteria for SEPTIC SHOCK  --        Are any of the following organ dysfunction criteria present within 6 hours of suspected infection and SIRS criteria that are NOT considered to be chronic conditions? --        Organ dysfunction  --        Date of presentation of severe sepsis  --        Time of presentation of severe sepsis  --        Tissue hypoperfusion persists in the hour after crystalloid fluid administration, evidenced, by either:  --        Was hypotension present within one hour of the conclusion of crystalloid fluid administration?  --        Date of presentation of septic shock  --        Time of presentation of septic shock  --          User Key  (r) = Recorded By, (t) = Taken By, (c) = Cosigned By    [address] Name Provider Ramiro Christianson MD Physician          SBIRT 22yo+      Most Recent Value   SBIRT (23 yo +)   In order to provide better care to our patients, we are screening all of our patients for alcohol and drug use  Would it be okay to ask you these screening questions? Yes Filed at: 06/13/2021 4093   Initial Alcohol Screen: US AUDIT-C    1  How often do you have a drink containing alcohol?  0 Filed at: 06/13/2021 0722   2  How many drinks containing alcohol do you have on a typical day you are drinking? 0 Filed at: 06/13/2021 0722   3a  Male UNDER 65: How often do you have five or more drinks on one occasion? 0 Filed at: 06/13/2021 0722   3b  FEMALE Any Age, or MALE 65+: How often do you have 4 or more drinks on one occassion? 0 Filed at: 06/13/2021 4300   Audit-C Score  0 Filed at: 06/13/2021 8144   EMILY: How many times in the past year have you    Used an illegal drug or used a prescription medication for non-medical reasons?   Never Filed at: 06/13/2021 2869          Wells' Criteria for PE      Most Recent Value   Wells' Criteria for PE   Clinical signs and symptoms of DVT  0 Filed at: 06/13/2021 0380   PE is primary diagnosis or equally likely  3 Filed at: 06/13/2021 0956   HR >100  0 Filed at: 06/13/2021 0956   Immobilization at least 3 days or Surgery in the previous 4 weeks  0 Filed at: 06/13/2021 0956   Previous, objectively diagnosed PE or DVT  0 Filed at: 06/13/2021 0956   Hemoptysis  0 Filed at: 06/13/2021 3190   Malignancy with treatment within 6 months or palliative  0 Filed at: 06/13/2021 7067   Wells' Criteria Total  3 Filed at: 06/13/2021 1555                [facility]  Number of Diagnoses or Management Options  Diagnosis management comments: DDX including but not limited to: pneumonia, pleural effusion, CHF, PE, PTX, ACS, MI, asthma exacerbation, COPD exacerbation, COVID 19, EVALI, anemia, metabolic abnormality, renal failure         Amount and/or Complexity of Data Reviewed  Clinical lab tests: ordered and reviewed  Tests in the radiology section of CPT®: ordered and reviewed  Decide to obtain previous medical records or to obtain history from someone other than the patient: yes  Obtain history from someone other than the patient: yes  Review and summarize past medical records: yes  Discuss the patient with other providers: yes  Independent visualization of images, tracings, or specimens: yes        Disposition  Final diagnoses:   COPD with acute exacerbation ([address] )     Time reflects when diagnosis was documented in both MDM as applicable and the Disposition within this note     Time User Action Codes Description Comment    6/13/2021  8:25 AM Aide [facility] [J44 1] COPD with acute exacerbation [facility])       ED Disposition     ED Disposition Condition Date/Time Comment    [facility] Jun 13, 2021  8:27 AM Date: 6/13/2021  Patient: Lastdany Kirklandadair  Admitted: 6/13/2021  6:27 AM  Attending Provider: Cookie Dickerson MD    Reba Isaeladair or his authorized caregiver has made the decision for the patient to leave the emergency department against the advice of  the emergency department staff  He or his authorized caregiver has been informed and understands the inherent risks, including death, worsening difficulty breathing, blood clot in lung, permanent disability  He or his authorized caregiver has decid ed to accept the responsibility for this decision  Danae Connelly and all necessary parties have been advised that he may return for further evaluation or treatment  His condition at time of discharge was fair  Danae Connelly had current vital signs as  follows:  /89 (BP Location: Right arm)   Pulse 74   Temp 98 7 °F (37 1 °C) (Oral)   Resp 20   Wt 123 kg (270 lb 11 6 oz)         Follow-up Information    None         Patient's Medications   Discharge Prescriptions    No medications on file     No discharge procedures on file      PDMP Review       Value Time User    PDMP Reviewed  Yes 6/13/2021  6:28 AM Kvng Parham MD          ED Provider  Electronically Signed by           Kvng Parham MD  06/13/21 8676

## 2021-06-14 NOTE — TELEPHONE ENCOUNTER
Patient calling saying he spoke with [facility] [address] [facility] and they are waiting for a signature so he can get his O2  He said they can bring it out today but they just need a signature from the doctor  He states he was in the ED yesterday for a COPD exacerbation so he really needs his O2  Please advise

## 2021-06-18 LAB
BACTERIA BLD CULT: NORMAL
BACTERIA BLD CULT: NORMAL

## 2021-06-23 ENCOUNTER — HOSPITAL ENCOUNTER (OUTPATIENT)
Dept: NON INVASIVE DIAGNOSTICS | Facility: HOSPITAL | Age: 61
Discharge: HOME | End: 2021-06-23
Payer: COMMERCIAL

## 2021-06-23 DIAGNOSIS — I50.32 CHRONIC DIASTOLIC HEART FAILURE (HCC): ICD-10-CM

## 2021-06-23 DIAGNOSIS — M79.89 BILATERAL SWELLING OF FEET: ICD-10-CM

## 2021-06-23 PROCEDURE — 93306 TTE W/DOPPLER COMPLETE: CPT | Performed by: INTERNAL MEDICINE

## 2021-06-23 PROCEDURE — 93306 TTE W/DOPPLER COMPLETE: CPT

## 2021-06-24 ENCOUNTER — TELEPHONE (OUTPATIENT)
Dept: INTERNAL MEDICINE CLINIC | Facility: CLINIC | Age: 61
End: 2021-06-24

## 2021-06-24 NOTE — TELEPHONE ENCOUNTER
Patient called office asking to speak about his CPAP machine  Patient reports using his CPAP at night, but wakes up gasping for breath at times  I informed patient that I see he was Pulmonology on 5/19/2021 and his CPAP was discussed  I recommended patient contact them to discuss symptoms  Patient also reported receiving an e-mail from [facility] regarding a recall on a CPAP machine  I encouraged patient to contact whomever sent that e-mail to determine if his CPAP has been effected by the recall  Patient verbally understood

## 2021-07-07 DIAGNOSIS — J44.9 CHRONIC OBSTRUCTIVE PULMONARY DISEASE, UNSPECIFIED COPD TYPE (HCC): ICD-10-CM

## 2021-07-07 RX ORDER — ALBUTEROL SULFATE 90 UG/1
2 AEROSOL, METERED RESPIRATORY (INHALATION) EVERY 6 HOURS PRN
Qty: 8.5 G | Refills: 5 | Status: CANCELLED | OUTPATIENT
Start: 2021-07-07

## 2021-07-07 NOTE — TELEPHONE ENCOUNTER
Name of medication, dose, quantity and frequency    Requested Prescriptions     Pending Prescriptions Disp Refills    albuterol (PROVENTIL HFA,VENTOLIN HFA) 90 mcg/act inhaler 8 5 g 5     Sig: Inhale 2 puffs every 6 (six) hours as needed for wheezing           Number of refills left:    Amount of medication left:    Pharmacy verified and updated- Yes    Additional information:

## 2021-07-07 NOTE — TELEPHONE ENCOUNTER
Spoke to [facility] and patient already has refills on file  I asked them to fill and mail to patient   I made patient aware it will be mailed

## 2021-07-12 ENCOUNTER — TELEPHONE (OUTPATIENT)
Dept: PULMONOLOGY | Facility: CLINIC | Age: 61
End: 2021-07-12

## 2021-07-12 NOTE — TELEPHONE ENCOUNTER
Patients insurance company calling saying patient would like to switch his cpap company to [facility] [address]  He will need a new order placed  Please advise and call patient once it is sent

## 2021-08-17 ENCOUNTER — OFFICE VISIT (OUTPATIENT)
Dept: INTERNAL MEDICINE CLINIC | Facility: CLINIC | Age: 61
End: 2021-08-17

## 2021-08-17 VITALS
OXYGEN SATURATION: 89 % | WEIGHT: 288.2 LBS | DIASTOLIC BLOOD PRESSURE: 91 MMHG | BODY MASS INDEX: 43.82 KG/M2 | SYSTOLIC BLOOD PRESSURE: 176 MMHG | TEMPERATURE: 98.5 F | HEART RATE: 69 BPM

## 2021-08-17 DIAGNOSIS — I10 HYPERTENSION, UNSPECIFIED TYPE: ICD-10-CM

## 2021-08-17 DIAGNOSIS — I50.32 CHRONIC DIASTOLIC HEART FAILURE (HCC): ICD-10-CM

## 2021-08-17 DIAGNOSIS — I50.30 DIASTOLIC CONGESTIVE HEART FAILURE, UNSPECIFIED HF CHRONICITY (HCC): ICD-10-CM

## 2021-08-17 DIAGNOSIS — J44.9 COPD (CHRONIC OBSTRUCTIVE PULMONARY DISEASE) (HCC): ICD-10-CM

## 2021-08-17 DIAGNOSIS — E78.5 HYPERLIPIDEMIA, UNSPECIFIED HYPERLIPIDEMIA TYPE: ICD-10-CM

## 2021-08-17 DIAGNOSIS — J44.9 CHRONIC OBSTRUCTIVE PULMONARY DISEASE, UNSPECIFIED COPD TYPE (HCC): ICD-10-CM

## 2021-08-17 DIAGNOSIS — I10 ESSENTIAL HYPERTENSION: ICD-10-CM

## 2021-08-17 DIAGNOSIS — I50.20 SYSTOLIC CONGESTIVE HEART FAILURE, UNSPECIFIED HF CHRONICITY (HCC): Primary | ICD-10-CM

## 2021-08-17 DIAGNOSIS — M79.89 BILATERAL SWELLING OF FEET: ICD-10-CM

## 2021-08-17 PROCEDURE — 99213 OFFICE O/P EST LOW 20 MIN: CPT | Performed by: INTERNAL MEDICINE

## 2021-08-17 RX ORDER — CARVEDILOL 3.12 MG/1
3.12 TABLET ORAL 2 TIMES DAILY WITH MEALS
Qty: 180 TABLET | Refills: 3 | Status: SHIPPED | OUTPATIENT
Start: 2021-08-17 | End: 2021-10-13 | Stop reason: SDUPTHER

## 2021-08-17 RX ORDER — ALBUTEROL SULFATE 90 UG/1
2 AEROSOL, METERED RESPIRATORY (INHALATION) EVERY 6 HOURS PRN
Qty: 8.5 G | Refills: 5 | Status: SHIPPED | OUTPATIENT
Start: 2021-08-17 | End: 2021-10-13 | Stop reason: SDUPTHER

## 2021-08-17 RX ORDER — ASPIRIN 81 MG/1
81 TABLET ORAL DAILY
Qty: 90 TABLET | Refills: 6 | Status: SHIPPED | OUTPATIENT
Start: 2021-08-17 | End: 2021-10-13 | Stop reason: SDUPTHER

## 2021-08-17 RX ORDER — ATORVASTATIN CALCIUM 40 MG/1
40 TABLET, FILM COATED ORAL
Qty: 90 TABLET | Refills: 6 | Status: SHIPPED | OUTPATIENT
Start: 2021-08-17 | End: 2021-10-13 | Stop reason: SDUPTHER

## 2021-08-17 RX ORDER — FLUTICASONE FUROATE AND VILANTEROL 200; 25 UG/1; UG/1
1 POWDER RESPIRATORY (INHALATION) DAILY
Qty: 60 EACH | Refills: 6 | Status: SHIPPED | OUTPATIENT
Start: 2021-08-17 | End: 2021-10-13 | Stop reason: SDUPTHER

## 2021-08-17 RX ORDER — LOSARTAN POTASSIUM 100 MG/1
100 TABLET ORAL DAILY
Qty: 90 TABLET | Refills: 6 | Status: SHIPPED | OUTPATIENT
Start: 2021-08-17 | End: 2021-08-27

## 2021-08-17 RX ORDER — DOXAZOSIN MESYLATE 4 MG/1
8 TABLET ORAL DAILY
Qty: 90 TABLET | Refills: 6 | Status: SHIPPED | OUTPATIENT
Start: 2021-08-17 | End: 2021-10-13 | Stop reason: SDUPTHER

## 2021-08-17 NOTE — PROGRESS NOTES
INTERNAL MEDICINE FOLLOW-UP OFFICE VISIT  [facility]  [address] [place] [address]    NAME: Jefe Torres  AGE: 56 y o  SEX: male    DATE OF ENCOUNTER: 8/17/2021    Assessment and Plan     1  Hypertension, unspecified type elevated blood pressure today 176/91, blood pressures have been elevated in previous visit  He was on amlodipine 10 mg which was discontinued due to bilateral lower extremity swelling  His home blood pressure measurements have been in the systolic 909Y  -   Was started on Lasix 40 mg daily p r n  at his last visit due to his symptoms of dyspnea on exertion, orthopnea, bilateral lower extremity swelling, today patient states he takes 20 mg b i d  daily   Improvement of his symptoms which is contrary  The instructions given at his last visit  Plan  · Will increase his Cardura from 4 mg to 8 mg daily  ·  and will increase the dose of Lasix to 40 mg b i d   · Patient was advised to check blood pressure  At home at least 3 times a week, document and bring to his next appointment  · Continue Losartan 100 mg daily  · Continue Coreg 3 125 mg b i d  this was restarted at his last visit  Patient was previously on Lopressor which  Was discontinued due to bradycardia  ·  Follow-up blood pressure visit in 1 week    - doxazosin (CARDURA) 4 mg tablet; Take 2 tablets (8 mg total) by mouth daily  Dispense: 90 tablet; Refill: 6    2  Chronic diastolic heart failure, unspecified HF chronicity ([facility] [address] )  3  Coronary  Artery disease s/p stent 2014 and angioplasty 2018    - Most recent echocardiogram on 06/2021 shows EF of 65%, no regional wall motion abnormality, grade 1 diastolic dysfunction  - Patient looks euvolemic, no JVD, no leg swelling  -  Weight is usually  About 270- 274 lb at baseline, today his weight is 288 lb  Admits to compliance with low-salt diet  -   Patient is started on Coreg 3 125 mg b i d  at his last visit    I chart review he was previously on Lopressor which was discontinued due to bradycardia  Today heart rate is  69 and stable  Continue current  Dose  Of Coreg   -  Started on Lasix 40 mg b i d today, will re-evaluate in 1 week for possible  Adjust dose if needed  -   Continue aspirin 81 mg  - Ambulatory referral to Cardiology; Future    - carvedilol (Coreg) 3 125 mg tablet; Take 1 tablet (3 125 mg total) by mouth 2 (two) times a day with meals  Dispense: 180 tablet; Refill: 3  - aspirin (ECOTRIN LOW STRENGTH) 81 mg EC tablet; Take 1 tablet (81 mg total) by mouth daily  Dispense: 90 tablet; Refill: 6  - losartan (COZAAR) 100 MG tablet; Take 1 tablet (100 mg total) by mouth daily  Dispense: 90 tablet; Refill: 6    4  Chronic obstructive pulmonary disease, unspecified COPD type ([facility] [address] )  Nicotene dependence   - Patient reports he quit smoking 2 weeks ago, previously smoked 3/4 pack of cigarettes a day  -   Should be on 2 L oxygen nasal cannula but did not wear his oxygen to the clinic today states he wears it p r n  His oxygen saturation today in the clinic is 89%, patient is not in any obvious distress, he was encouraged to wear his nasal cannula  At all times until seen by pulmonology  -  Follow-up with pulmonology he has a scheduled appointment on 08/18/2021  - albuterol (PROVENTIL HFA,VENTOLIN HFA) 90 mcg/act inhaler; Inhale 2 puffs every 6 (six) hours as needed for wheezing  Dispense: 8 5 g; Refill: 5  - fluticasone-vilanterol (BREO ELLIPTA) 200-25 MCG/INH inhaler; Inhale 1 puff daily Rinse mouth after use  Dispense: 60 each; Refill: 6  - tiotropium (SPIRIVA RESPIMAT) 2 5 MCG/ACT AERS inhaler; Inhale 2 puffs daily  Dispense: 4 g; Refill: 3    6  Bilateral swelling of feet   patient states is improved on Lasix 20 mg b i d    he still has very significant Pt edema palpation to the knee  Patient denies chest pain, states his dyspnea on exertion is  Improved  -  I instructed patient to take 40 mg of Lasix b i d  for 7 days    He will follow-up in 1 week to re-evaluate  Consider deescalating Lasix if appropriate   - carvedilol (Coreg) 3 125 mg tablet; Take 1 tablet (3 125 mg total) by mouth 2 (two) times a day with meals  Dispense: 180 tablet; Refill: 3    8  Hyperlipidemia, unspecified hyperlipidemia type    - atorvastatin (LIPITOR) 40 mg tablet; Take 1 tablet (40 mg total) by mouth daily at bedtime  Dispense: 90 tablet; Refill: 6     DAVID patient is compliant with his CPAP at night     History of gouty arthritis previously on allopurinol now not on any medication  He is hydrochlorothiazide usual high blood pressure was discontinued due to gout  He currently has no flare  Contact dermatitis  Patient has poison ivy with blisters on his left leg  Patient admits working in the [employer] most of the time he had multiple poison ivy on his legs due to exposure  He   Initially had pruritus on the left leg  Now he denies pruritus, tenderness, suppuration, fever  Patient was encouraged to wear trousers to cover his legs while working in the yard  To prevent further recurrence  FOLLOW-UP IN 1 WEEK FOR BLOOD PRESSURE CHECK  Orders Placed This Encounter   Procedures    Ambulatory referral to Cardiology       - Counseling Documentation: patient was counseled regarding: risk factor reductions and importance of compliance with treatment    Chief Complaint     Chief Complaint   Patient presents with    Follow-up     bilateral leg swelling improved       History of Present Illness     HPI    Patient is a 56years old male with past medical history significant for coronary artery disease status post stent ,  Grade 1 diastolic CHF,  Hypertension,  Pre diabetes,  Obstructive sleep apnea on CPAP present for follow-up of his  Bilateral lower extremity swelling which he states is improved with Lasix 20 mg b i d  started at his last visit on 05/07/2021  His amlodipine was also discontinued and  was started on Coreg 3 125 mg b i d   Pt  Reports today his blood pressures have been in the systolic's 425E/05Y at home  Today, his blood pressure in the clinic is 176/91  Patient was counseled on the benefits of compliance with  Low-salt diet  His oxygen saturation today was 89%  Patient recently saw pulmonology on 05/19/2021,had a POC walk which determined he required 2 L oxygen nasal cannula , he states  he wears his oxygen p r n   based on his  Ox meter at home  I encouraged patient to continue wearing his oxygen nasal NC until he sees pulmonology tomorrow  He was  also admitted for COPD exacerbation on 06/13/2021 in the ED,  CTA PE showed no pulmonary embolism, patient left AMA but was  Discharged on azithromycin and prednisone  Today patient looks stable not in any exacerbation on room air, requested medications have been refilled at this visit        The following portions of the patient's history were reviewed and updated as appropriate: allergies, current medications, past family history, past medical history, past social history, past surgical history and problem list     Review of Systems     Review of Systems  Twelve point review of system unremarkable except that listed in HPI above    Active Problem List     Patient Active Problem List   Diagnosis    Acute respiratory failure with hypoxia and hypercapnia ([address] )    Hypertension    Tobacco use    DAVID (obstructive sleep apnea)    Elevated troponin    S/P cardiac cath    CAD (coronary artery disease)    COPD (chronic obstructive pulmonary disease) ([address] )    Community acquired pneumonia    Gout    Hyperlipidemia    Leukocytosis    Chronic respiratory failure with hypoxia (HCC)    Edema of both lower extremities    Periodic limb movements of sleep       Objective     BP (!) 176/91 (BP Location: Left arm, Patient Position: Sitting, Cuff Size: Large)   Pulse 69   Temp 98 5 °F (36 9 °C) (Temporal)   Wt 131 kg (288 lb 3 2 oz)   SpO2 (!) 89%   BMI 43 82 kg/m²     Physical Exam  Constitutional:       Appearance: He is obese    HENT:      Head: Normocephalic and atraumatic  Nose: Nose normal       Mouth/Throat:      Mouth: Mucous membranes are moist    Eyes:      Extraocular Movements: Extraocular movements intact  Cardiovascular:      Rate and Rhythm: Normal rate and regular rhythm  Pulses: Normal pulses  Heart sounds: Normal heart sounds  Pulmonary:      Effort: Pulmonary effort is normal       Breath sounds: Normal breath sounds  Abdominal:      General: Bowel sounds are normal       Palpations: Abdomen is soft  Comments: obese   Musculoskeletal:         General: Swelling present  No tenderness  Normal range of motion  Skin:     General: Skin is warm and dry  Capillary Refill: Capillary refill takes less than 2 seconds  Neurological:      General: No focal deficit present  Mental Status: He is alert and oriented to person, place, and time     Psychiatric:         Mood and Affect: Mood normal          Behavior: Behavior normal          Pertinent Laboratory/Diagnostic Studies:  CBC:   Lab Results   Component Value Date/Time    WBC 7 89 06/13/2021 07:32 AM    WBC 10 11 12/08/2015 05:04 PM    RBC 5 32 06/13/2021 07:32 AM    RBC 5 40 12/08/2015 05:04 PM    HGB 15 6 06/13/2021 07:32 AM    HGB 16 4 12/08/2015 05:04 PM    HCT 49 1 06/13/2021 07:32 AM    HCT 49 4 (H) 12/08/2015 05:04 PM    MCV 92 06/13/2021 07:32 AM    MCV 92 12/08/2015 05:04 PM    MCH 29 3 06/13/2021 07:32 AM    MCH 30 4 12/08/2015 05:04 PM    MCHC 31 8 06/13/2021 07:32 AM    MCHC 33 2 12/08/2015 05:04 PM    RDW 12 9 06/13/2021 07:32 AM    RDW 12 4 12/08/2015 05:04 PM    MPV 11 1 06/13/2021 07:32 AM    MPV 11 4 12/08/2015 05:04 PM     06/13/2021 07:32 AM     12/08/2015 05:04 PM    NRBC 0 06/13/2021 07:32 AM    NEUTOPHILPCT 69 06/13/2021 07:32 AM    NEUTOPHILPCT 59 12/08/2015 05:04 PM    LYMPHOPCT 17 06/13/2021 07:32 AM    LYMPHOPCT 25 12/08/2015 05:04 PM    MONOPCT 7 06/13/2021 07:32 AM    MONOPCT 8 12/08/2015 05:04 PM    EOSPCT 5 06/13/2021 07:32 AM    EOSPCT 7 (H) 12/08/2015 05:04 PM    BASOPCT 1 06/13/2021 07:32 AM    BASOPCT 1 12/08/2015 05:04 PM    NEUTROABS 5 53 06/13/2021 07:32 AM    NEUTROABS 5 96 12/08/2015 05:04 PM    LYMPHSABS 1 31 06/13/2021 07:32 AM    LYMPHSABS 2 53 12/08/2015 05:04 PM    MONOSABS 0 56 06/13/2021 07:32 AM    MONOSABS 0 81 12/08/2015 05:04 PM    EOSABS 0 40 06/13/2021 07:32 AM    EOSABS 0 71 (H) 12/08/2015 05:04 PM     CBC:   Results from Last 12 Months   Lab Units 06/13/21  0732   WBC Thousand/uL 7 89   RBC Million/uL 5 32   HEMOGLOBIN g/dL 15 6   HEMATOCRIT % 49 1   MCV fL 92   MCH pg 29 3   MCHC g/dL 31 8   RDW % 12 9   MPV fL 11 1   PLATELETS Thousands/uL 177   NRBC AUTO /100 WBCs 0   NEUTROS PCT % 69   LYMPHS PCT % 17   MONOS PCT % 7   EOS PCT % 5   BASOS PCT % 1   NEUTROS ABS Thousands/µL 5 53   LYMPHS ABS Thousands/µL 1 31   MONOS ABS Thousand/µL 0 56   EOS ABS Thousand/µL 0 40       Current Medications     Current Outpatient Medications:     albuterol (PROVENTIL HFA,VENTOLIN HFA) 90 mcg/act inhaler, Inhale 2 puffs every 6 (six) hours as needed for wheezing, Disp: 8 5 g, Rfl: 5    aspirin (ECOTRIN LOW STRENGTH) 81 mg EC tablet, Take 1 tablet (81 mg total) by mouth daily, Disp: 90 tablet, Rfl: 6    atorvastatin (LIPITOR) 40 mg tablet, Take 1 tablet (40 mg total) by mouth daily at bedtime, Disp: 90 tablet, Rfl: 6    carvedilol (Coreg) 3 125 mg tablet, Take 1 tablet (3 125 mg total) by mouth 2 (two) times a day with meals, Disp: 180 tablet, Rfl: 3    [employer] ALLERGY RELIEF 10 MG tablet, , Disp: , Rfl:     doxazosin (CARDURA) 4 mg tablet, Take 2 tablets (8 mg total) by mouth daily, Disp: 90 tablet, Rfl: 6    fluticasone-vilanterol (BREO ELLIPTA) 200-25 MCG/INH inhaler, Inhale 1 puff daily Rinse mouth after use , Disp: 60 each, Rfl: 6    losartan (COZAAR) 100 MG tablet, Take 1 tablet (100 mg total) by mouth daily, Disp: 90 tablet, Rfl: 6    tiotropium (SPIRIVA RESPIMAT) 2 5 MCG/ACT AERS inhaler, Inhale 2 puffs daily, Disp: 4 g, Rfl: 3    Health Maintenance     Health Maintenance   Topic Date Due    Medicare Annual Wellness Visit (AWV)  Never done    Colorectal Cancer Screening  Never done    Influenza Vaccine (1) 09/01/2021    Depression Screening PHQ  02/09/2022    BMI: Followup Plan  02/09/2022    BMI: Adult  08/17/2022    Pneumococcal Vaccine: Pediatrics (0 to 5 Years) and At-Risk Patients (6 to 59 Years) (2 of 2 - PPSV23) 12/14/2023    DTaP,Tdap,and Td Vaccines (2 - Td or Tdap) 03/11/2026    HIV Screening  Completed    Hepatitis C Screening  Completed    COVID-19 Vaccine  Completed    HIB Vaccine  Aged Out    Hepatitis B Vaccine  Aged Out    IPV Vaccine  Aged Out    Hepatitis A Vaccine  Aged Out    Meningococcal ACWY Vaccine  Aged Out    HPV Vaccine  Aged Out     Immunization History   Administered Date(s) Administered    INFLUENZA 10/09/2009, 11/01/2017    Influenza, seasonal, injectable, preservative free 11/01/2017    Pneumococcal Polysaccharide PPV23 04/18/2018    SARS-CoV-2 / COVID-19 mRNA IM (Pfizer-BioNTech) 03/30/2021, 04/22/2021    Tdap 03/11/2016       Susan RAO    Internal Medicine PGY-2  8/17/2021 12:37 PM

## 2021-08-27 ENCOUNTER — OFFICE VISIT (OUTPATIENT)
Dept: INTERNAL MEDICINE CLINIC | Facility: CLINIC | Age: 61
End: 2021-08-27

## 2021-08-27 VITALS
DIASTOLIC BLOOD PRESSURE: 103 MMHG | HEART RATE: 68 BPM | SYSTOLIC BLOOD PRESSURE: 208 MMHG | WEIGHT: 290.6 LBS | OXYGEN SATURATION: 90 % | BODY MASS INDEX: 44.19 KG/M2 | TEMPERATURE: 98.2 F

## 2021-08-27 DIAGNOSIS — I10 HYPERTENSION, UNSPECIFIED TYPE: Primary | ICD-10-CM

## 2021-08-27 DIAGNOSIS — G47.33 OSA (OBSTRUCTIVE SLEEP APNEA): Chronic | ICD-10-CM

## 2021-08-27 PROCEDURE — 3077F SYST BP >= 140 MM HG: CPT | Performed by: INTERNAL MEDICINE

## 2021-08-27 PROCEDURE — 3080F DIAST BP >= 90 MM HG: CPT | Performed by: INTERNAL MEDICINE

## 2021-08-27 PROCEDURE — 99214 OFFICE O/P EST MOD 30 MIN: CPT | Performed by: INTERNAL MEDICINE

## 2021-08-27 RX ORDER — LOSARTAN POTASSIUM 100 MG/1
100 TABLET ORAL DAILY
Qty: 90 TABLET | Refills: 3 | Status: SHIPPED | OUTPATIENT
Start: 2021-08-27 | End: 2021-10-13 | Stop reason: SDUPTHER

## 2021-08-27 RX ORDER — LOSARTAN POTASSIUM 25 MG/1
25 TABLET ORAL DAILY
Qty: 30 TABLET | Refills: 1 | Status: SHIPPED | OUTPATIENT
Start: 2021-08-27 | End: 2021-08-27

## 2021-08-27 RX ORDER — AMLODIPINE BESYLATE 5 MG/1
5 TABLET ORAL DAILY
Qty: 30 TABLET | Refills: 1 | Status: SHIPPED | OUTPATIENT
Start: 2021-08-27 | End: 2021-09-21

## 2021-08-27 NOTE — PROGRESS NOTES
[facility]  INTERNAL MEDICINE OFFICE VISIT     PATIENT INFORMATION     Ambreen Domingo   56 y o  male   MRN: [id number]    ASSESSMENT/PLAN     1  Hypertension, unspecified type  Assessment & Plan:  BP at home apparently 788'S systolic, not smoking, taking medications/compliant  BP in office >200  No HA, CP, palpitations, dizziness     Stop cardura (doxasozin) 4 mg  Adding amlodipine 5mg, uptitrate (go up to 10mg daily) as needed for SBP >150  Keeping losartan 100mg  Keep taking BP at home 3 times per day, bring in logs  Keep taking lasix 40 mg twice daily (when get up and around 3-4PM)   Previously taking all 80mg in morning as didn't want to urinate at night  Keep taking coreg 3 125  BID  Continue using CPAP  Encourage diet and lifestyle modifications  F/u 4 weeks for recheck BP      Orders:  -     amLODIPine (NORVASC) 5 mg tablet; Take 1 tablet (5 mg total) by mouth daily  -     losartan (COZAAR) 100 MG tablet; Take 1 tablet (100 mg total) by mouth daily    2  DAVID (obstructive sleep apnea)  Assessment & Plan:  Continue using CPAP 17/17 settings          Schedule a follow-up appointment in 4 weeks for BP recheck      HEALTH MAINTENANCE     Immunization History   Administered Date(s) Administered    INFLUENZA 10/09/2009, 11/01/2017    Influenza, seasonal, injectable, preservative free 11/01/2017    Pneumococcal Polysaccharide PPV23 04/18/2018    SARS-CoV-2 / COVID-19 mRNA IM (Pfizer-BioNTech) 03/30/2021, 04/22/2021    Tdap 03/11/2016     Immunizations:  · UTD  Screening:  · UTD       CHIEF COMPLAINT     Chief Complaint   Patient presents with    Follow-up     blood pressure follow up      [facility]      patient is a 75-year-old male past medical history of hypertension, CAD status post stents in 2014 and angioplasty 2018, COPD currently on 2 L of oxygen with ambulation, DAVID compliant with CPAP, CHF with EF 70% grade 1 diastolic dysfunction 26/2152, who presents today as a follow-up of recent medication changes and high blood pressure  Last visit patient was started on Coreg 3 125 mg b i d  and increase Lasix dose from 20 to 40 mg b i d  increasing Cardura from 4-8 mg daily systolic blood pressure in office 200s and currently 208/103  Has arm cuff at home, [id number]   limiting salt, previously taken off amlodipine for LE swelling, but patient eating chips and pretzels everyday  7/17 CPAP, compliant  No smoking    REVIEW OF SYSTEMS     Review of Systems   Constitutional: Negative for fever  Respiratory: Negative for cough and shortness of breath  Cardiovascular: Negative for palpitations  Gastrointestinal: Negative for blood in stool, diarrhea, nausea and vomiting  Neurological: Negative for dizziness, syncope and light-headedness  OBJECTIVE     Vitals:    08/27/21 1012   BP: (!) 208/103   BP Location: Right arm   Patient Position: Sitting   Cuff Size: Large   Pulse: 68   Temp: 98 2 °F (36 8 °C)   TempSrc: Temporal   SpO2: 90%   Weight: 132 kg (290 lb 9 6 oz)     Physical Exam  Constitutional:       Appearance: He is obese  He is not ill-appearing  HENT:      Mouth/Throat:      Mouth: Mucous membranes are dry  Comments: Wearing nasal cannula 2 L  Cardiovascular:      Rate and Rhythm: Normal rate and regular rhythm  Heart sounds: No murmur heard  No friction rub  No gallop  Pulmonary:      Effort: Pulmonary effort is normal       Breath sounds: Wheezing (expiratory) and rhonchi present  Abdominal:      General: Bowel sounds are normal       Palpations: Abdomen is soft  Tenderness: There is no abdominal tenderness  There is no guarding  Musculoskeletal:      Right lower leg: Edema (1+) present  Left lower leg: Edema (1+) present  Skin:     General: Skin is warm and dry  Findings: No erythema  Neurological:      Mental Status: He is alert     Psychiatric:         Mood and Affect: Mood normal        CURRENT MEDICATIONS Current Outpatient Medications:     albuterol (PROVENTIL HFA,VENTOLIN HFA) 90 mcg/act inhaler, Inhale 2 puffs every 6 (six) hours as needed for wheezing, Disp: 8 5 g, Rfl: 5    aspirin (ECOTRIN LOW STRENGTH) 81 mg EC tablet, Take 1 tablet (81 mg total) by mouth daily, Disp: 90 tablet, Rfl: 6    atorvastatin (LIPITOR) 40 mg tablet, Take 1 tablet (40 mg total) by mouth daily at bedtime, Disp: 90 tablet, Rfl: 6    carvedilol (Coreg) 3 125 mg tablet, Take 1 tablet (3 125 mg total) by mouth 2 (two) times a day with meals, Disp: 180 tablet, Rfl: 3    [employer] ALLERGY RELIEF 10 MG tablet, , Disp: , Rfl:     doxazosin (CARDURA) 4 mg tablet, Take 2 tablets (8 mg total) by mouth daily, Disp: 90 tablet, Rfl: 6    fluticasone-vilanterol (BREO ELLIPTA) 200-25 MCG/INH inhaler, Inhale 1 puff daily Rinse mouth after use , Disp: 60 each, Rfl: 6    tiotropium (SPIRIVA RESPIMAT) 2 5 MCG/ACT AERS inhaler, Inhale 2 puffs daily, Disp: 4 g, Rfl: 3    amLODIPine (NORVASC) 5 mg tablet, Take 1 tablet (5 mg total) by mouth daily, Disp: 30 tablet, Rfl: 1    losartan (COZAAR) 100 MG tablet, Take 1 tablet (100 mg total) by mouth daily, Disp: 90 tablet, Rfl: 3    PAST MEDICAL & SURGICAL HISTORY     Past Medical History:   Diagnosis Date    CAD (coronary artery disease)     COPD (chronic obstructive pulmonary disease) (HCC)     Gout     Hyperlipidemia     Hypertension     Orthopnea     last assessed: 8/17/16    Pericardial effusion     Sleep apnea      Past Surgical History:   Procedure Laterality Date    CARDIAC CATHETERIZATION       SOCIAL & FAMILY HISTORY     Social History     Socioeconomic History    Marital status: Single     Spouse name: Not on file    Number of children: 3    Years of education: Not on file    Highest education level: Not on file   Occupational History    Occupation: Unemployed, not looking for work   Tobacco Use    Smoking status: Current Every Day Smoker     Packs/day: 0 20     Years: 46 00 Pack years: 9 20     Types: Cigarettes     Last attempt to quit: 2015     Years since quittin 1    Smokeless tobacco: Never Used    Tobacco comment: 1 cigarette a day   Vaping Use    Vaping Use: Never used   Substance and Sexual Activity    Alcohol use: No     Comment: quit 3 years ago   Drug use: No    Sexual activity: Never     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  Other Topics Concern    Not on file   Social History Narrative     as per [employer]     Social Determinants of Health     Financial Resource Strain: Low Risk     Difficulty of Paying Living Expenses: Not hard at all   Food Insecurity: No Food Insecurity    Worried About Running Out of Food in the Last Year: Never true    [address] in the Last Year: Never true   Transportation Needs: No Transportation Needs    Lack of Transportation (Medical): No    Lack of Transportation (Non-Medical): No   Physical Activity: Sufficiently Active    Days of Exercise per Week: 7 days    Minutes of Exercise per Session: 30 min   Stress: No Stress Concern Present    Feeling of Stress : Not at all   Social Connections: Moderately Isolated    Frequency of Communication with Friends and Family: Twice a week    Frequency of Social Gatherings with Friends and Family: Once a week    Attends [employer] Services: Never    Active Member of Clubs or Organizations: No    Attends Club or Organization Meetings: Never    Marital Status: Living with partner   Intimate Partner Violence: Not At Risk    Fear of Current or Ex-Partner: No    Emotionally Abused: No    Physically Abused: No    Sexually Abused: No     Social History     Substance and Sexual Activity   Alcohol Use No    Comment: quit 3 years ago         Social History     Substance and Sexual Activity   Drug Use No     Social History     Tobacco Use   Smoking Status Current Every Day Smoker    Packs/day: 0 20    Years: 46 00    Pack years: 9 20    Types: Cigarettes    Last attempt to quit: 2015    Years since quittin 1   Smokeless Tobacco Never Used   Tobacco Comment    1 cigarette a day     Family History   Problem Relation Age of Onset    Diabetes Father     Hypertension Mother      ==  Risa Celestin ,     [facility]  [address]   [facility] [address] , [address] [address] [facility] [address], [address] [facility]  Office: [phone]  Fax: [phone]

## 2021-08-27 NOTE — PATIENT INSTRUCTIONS
Stop cardura (doxasozin) 4 mg  Adding amlodipine 5mg, uptitrate (go up to 10mg daily) as needed for SBP >150  Keeping losartan  Keep taking BP at home 3 times per day, bring in logs  Keep taking lasix 40 mg twice daily (when get up and around 3-4PM)  Keep taking coreg 3 125  BID

## 2021-08-27 NOTE — ASSESSMENT & PLAN NOTE
BP at home apparently 644'E systolic, not smoking, taking medications/compliant  BP in office >200  No HA, CP, palpitations, dizziness     Stop cardura (doxasozin) 4 mg  Adding amlodipine 5mg, uptitrate (go up to 10mg daily) as needed for SBP >150  Keeping losartan 100mg  Keep taking BP at home 3 times per day, bring in logs  Keep taking lasix 40 mg twice daily (when get up and around 3-4PM)   Previously taking all 80mg in morning as didn't want to urinate at night  Keep taking coreg 3 125  BID  Continue using CPAP  Encourage diet and lifestyle modifications  F/u 4 weeks for recheck BP

## 2021-09-14 ENCOUNTER — HOSPITAL ENCOUNTER (EMERGENCY)
Facility: HOSPITAL | Age: 61
Discharge: HOME | End: 2021-09-14
Attending: EMERGENCY MEDICINE
Payer: COMMERCIAL

## 2021-09-14 VITALS
OXYGEN SATURATION: 92 % | SYSTOLIC BLOOD PRESSURE: 178 MMHG | WEIGHT: 280 LBS | TEMPERATURE: 98.6 F | RESPIRATION RATE: 20 BRPM | HEART RATE: 73 BPM | BODY MASS INDEX: 42.57 KG/M2 | DIASTOLIC BLOOD PRESSURE: 79 MMHG

## 2021-09-14 DIAGNOSIS — L03.116 CELLULITIS OF LEFT LEG: Primary | ICD-10-CM

## 2021-09-14 PROCEDURE — 99284 EMERGENCY DEPT VISIT MOD MDM: CPT | Performed by: EMERGENCY MEDICINE

## 2021-09-14 PROCEDURE — 99283 EMERGENCY DEPT VISIT LOW MDM: CPT

## 2021-09-14 RX ORDER — CEPHALEXIN 500 MG/1
500 CAPSULE ORAL EVERY 6 HOURS SCHEDULED
Qty: 28 CAPSULE | Refills: 0 | Status: SHIPPED | OUTPATIENT
Start: 2021-09-14 | End: 2021-09-21

## 2021-09-14 RX ORDER — CEPHALEXIN 250 MG/1
500 CAPSULE ORAL ONCE
Status: COMPLETED | OUTPATIENT
Start: 2021-09-14 | End: 2021-09-14

## 2021-09-14 RX ADMIN — CEPHALEXIN 500 MG: 250 CAPSULE ORAL at 21:21

## 2021-09-15 NOTE — ED PROVIDER NOTES
History  Chief Complaint   Patient presents with    Wound Infection     Pt had poison and scratched them and now believes they are infected  Wounds noted to L leg      47-year-old male presents to the emergency department for evaluation of a suspected skin infection  The patient reports that he was helping a friend with yd work approximately 3 weeks ago  States that he developed a rash on his legs that he thought was poison ivy  The rash became itchy about a week ago and he was scratching it causing open wounds to his left leg  He believes that the wound became infected  He states that he has been treating the wounds by putting hydrogen peroxide on them 5 times per day  He denies fevers, chills, nausea, vomiting, diarrhea, sick contacts and recent travel  Prior to Admission Medications   Prescriptions Last Dose Informant Patient Reported? Taking? [facility] ALL[facility] RELIEF 10 MG tablet 9/14/2021 at Unknown time  Yes Yes   albuterol (PROVENTIL HFA,VENTOLIN HFA) 90 mcg/act inhaler Past Month at Unknown time  No Yes   Sig: Inhale 2 puffs every 6 (six) hours as needed for wheezing   amLODIPine (NORVASC) 5 mg tablet 9/14/2021  No Yes   Sig: Take 1 tablet (5 mg total) by mouth daily   aspirin (ECOTRIN LOW STRENGTH) 81 mg EC tablet 9/14/2021  No Yes   Sig: Take 1 tablet (81 mg total) by mouth daily   atorvastatin (LIPITOR) 40 mg tablet 9/14/2021  No Yes   Sig: Take 1 tablet (40 mg total) by mouth daily at bedtime   carvedilol (Coreg) 3 125 mg tablet 9/14/2021  No Yes   Sig: Take 1 tablet (3 125 mg total) by mouth 2 (two) times a day with meals   doxazosin (CARDURA) 4 mg tablet 9/14/2021  No Yes   Sig: Take 2 tablets (8 mg total) by mouth daily   fluticasone-vilanterol (BREO ELLIPTA) 200-25 MCG/INH inhaler 9/14/2021  No Yes   Sig: Inhale 1 puff daily Rinse mouth after use     losartan (COZAAR) 100 MG tablet 9/14/2021  No Yes   Sig: Take 1 tablet (100 mg total) by mouth daily   tiotropium (SPIRIVA RESPIMAT) 2 5 MCG/ACT AERS inhaler 2021  No Yes   Sig: Inhale 2 puffs daily      Facility-Administered Medications: None       Past Medical History:   Diagnosis Date    CAD (coronary artery disease)     COPD (chronic obstructive pulmonary disease) ([facility] [address] )     Gout     Hyperlipidemia     Hypertension     Orthopnea     last assessed: 16    Pericardial effusion     Sleep apnea        Past Surgical History:   Procedure Laterality Date    CARDIAC CATHETERIZATION         Family History   Problem Relation Age of Onset    Diabetes Father     Hypertension Mother      I have reviewed and agree with the history as documented  E-Cigarette/Vaping    E-Cigarette Use Never User      E-Cigarette/Vaping Substances    Nicotine No     THC No     CBD No     Flavoring No     Other No     Unknown No      Social History     Tobacco Use    Smoking status: Current Every Day Smoker     Packs/day: 0 20     Years: 46 00     Pack years: 9 20     Types: Cigarettes     Last attempt to quit: 2015     Years since quittin 2    Smokeless tobacco: Never Used    Tobacco comment: 1 cigarette a day   Vaping Use    Vaping Use: Never used   Substance Use Topics    Alcohol use: No     Comment: quit 3 years ago   Drug use: No        Review of Systems   Constitutional: Negative for chills and fever  HENT: Negative for ear pain and sore throat  Eyes: Negative for pain and visual disturbance  Respiratory: Negative for cough and shortness of breath  Cardiovascular: Negative for chest pain and palpitations  Gastrointestinal: Negative for abdominal pain and vomiting  Genitourinary: Negative for dysuria and hematuria  Musculoskeletal: Negative for arthralgias and back pain  Skin: Positive for wound  Negative for color change and rash  Neurological: Negative for seizures and syncope  All other systems reviewed and are negative        Physical Exam  ED Triage Vitals   Temperature Pulse Respirations Blood Pressure SpO2   09/14/21 1951 09/14/21 1951 09/14/21 1951 09/14/21 1951 09/14/21 1951   98 6 °F (37 °C) 74 18 165/80 92 %      Temp Source Heart Rate Source Patient Position - Orthostatic VS BP Location FiO2 (%)   09/14/21 1951 09/14/21 2036 -- 09/14/21 2036 --   Oral Monitor  Right arm       Pain Score       09/14/21 1951       5             Orthostatic Vital Signs  Vitals:    09/14/21 1951 09/14/21 2036   BP: 165/80 (!) 178/79   Pulse: 74 73       Physical Exam  Vitals and nursing note reviewed  Constitutional:       Appearance: He is well-developed  HENT:      Head: Normocephalic and atraumatic  Eyes:      Conjunctiva/sclera: Conjunctivae normal    Cardiovascular:      Rate and Rhythm: Normal rate and regular rhythm  Heart sounds: No murmur heard  Pulmonary:      Effort: Pulmonary effort is normal  No respiratory distress  Breath sounds: Normal breath sounds  Abdominal:      Palpations: Abdomen is soft  Tenderness: There is no abdominal tenderness  Musculoskeletal:      Cervical back: Neck supple  Skin:     General: Skin is warm and dry  Findings: Erythema and wound present  No abscess  Neurological:      Mental Status: He is alert  ED Medications  Medications   cephalexin (KEFLEX) capsule 500 mg (500 mg Oral Given 9/14/21 2121)       Diagnostic Studies  Results Reviewed     None                 No orders to display         Procedures  Procedures      ED Course                             SBIRT 22yo+      Most Recent Value   SBIRT (23 yo +)   In order to provide better care to our patients, we are screening all of our patients for alcohol and drug use  Would it be okay to ask you these screening questions? No Filed at: 09/14/2021 2046                [facility]  Number of Diagnoses or Management Options  Cellulitis of left leg  Diagnosis management comments: 75-year-old male presented to the emergency department for evaluation of a suspected skin infection    On arrival the patient was awake, alert, oriented and in no acute distress  Initial vital signs were stable  Physical exam was consistent with left lower leg cellulitis  The patient was given a dose of antibiotics here in the emergency department and will be discharged on a 7 day course  Recommendation was made for him to follow up with his PCP  Return precautions were discussed  Patient agrees with the plan for discharge and feels comfortable to go home with proper f/u  Advised to return for worsening or additional problems  Diagnostic tests were reviewed and questions answered  Diagnosis, care plan and treatment options were discussed  The patient understands instructions and will follow up as directed  Disposition  Final diagnoses:   Cellulitis of left leg     Time reflects when diagnosis was documented in both MDM as applicable and the Disposition within this note     Time User Action Codes Description Comment    9/14/2021  9:09 PM Ena Dixon Add [[id number]] Cellulitis of left leg       ED Disposition     ED Disposition Condition Date/Time Comment    Discharge Stable Tue Sep 14, 2021  9:09 PM Aloma Minors discharge to home/self care              Follow-up Information     Follow up With Specialties Details Why Contact Info Additional Information    Pool Diaz DO  Schedule an appointment as soon as possible for a visit   [address] [place] [address],[address] (RTE) - Internal Medicine  [facility] Emergency Department Emergency Medicine Go to  If symptoms worsen [address] [place] [address]  [address] [place] [address] [address] [facility] Emergency Department, [address]          Discharge Medication List as of 9/14/2021  9:40 PM      START taking these medications    Details   cephalexin (KEFLEX) 500 mg capsule Take 1 capsule (500 mg total) by mouth every 6 (six) hours for 7 days, Starting Tue 9/14/2021, Until Tue 9/21/2021, Normal         CONTINUE these medications which have NOT CHANGED    Details   albuterol (PROVENTIL HFA,VENTOLIN HFA) 90 mcg/act inhaler Inhale 2 puffs every 6 (six) hours as needed for wheezing, Starting Tue 8/17/2021, Normal      amLODIPine (NORVASC) 5 mg tablet Take 1 tablet (5 mg total) by mouth daily, Starting Fri 8/27/2021, Normal      aspirin (ECOTRIN LOW STRENGTH) 81 mg EC tablet Take 1 tablet (81 mg total) by mouth daily, Starting Tue 8/17/2021, Normal      atorvastatin (LIPITOR) 40 mg tablet Take 1 tablet (40 mg total) by mouth daily at bedtime, Starting Tue 8/17/2021, Normal      carvedilol (Coreg) 3 125 mg tablet Take 1 tablet (3 125 mg total) by mouth 2 (two) times a day with meals, Starting Tue 8/17/2021, Normal      [facility] ALLERGY RELIEF 10 MG tablet Starting Thu 9/13/2018, Historical Med      doxazosin (CARDURA) 4 mg tablet Take 2 tablets (8 mg total) by mouth daily, Starting Tue 8/17/2021, Normal      fluticasone-vilanterol (BREO ELLIPTA) 200-25 MCG/INH inhaler Inhale 1 puff daily Rinse mouth after use , Starting Tue 8/17/2021, Until Mon 11/15/2021, Normal      losartan (COZAAR) 100 MG tablet Take 1 tablet (100 mg total) by mouth daily, Starting Fri 8/27/2021, Normal      tiotropium (SPIRIVA RESPIMAT) 2 5 MCG/ACT AERS inhaler Inhale 2 puffs daily, Starting Tue 8/17/2021, Normal           No discharge procedures on file  PDMP Review       Value Time User    PDMP Reviewed  Yes 6/13/2021  6:28 AM Mayda Romero MD           ED Provider  Attending physically available and evaluated Geovany Nichols  [facility] managed the patient along with the ED Attending      Electronically Signed by         Christoph Abdul MD  09/15/21 9288

## 2021-09-15 NOTE — ED ATTENDING ATTESTATION
9/14/2021  ILarry Arm, MD, saw and evaluated the patient  I have discussed the patient with the resident/non-physician practitioner and agree with the resident's/non-physician practitioner's findings, Plan of Care, and MDM as documented in the resident's/non-physician practitioner's note, except where noted  All available labs and Radiology studies were reviewed  I was present for key portions of any procedure(s) performed by the resident/non-physician practitioner and I was immediately available to provide assistance  At this point I agree with the current assessment done in the Emergency Department  I have conducted an independent evaluation of this patient a history and physical is as follows:  lle cellulitis after poison ivy  No systemic sxs   Agree with plan  ED Course         Critical Care Time  Procedures

## 2021-10-02 ENCOUNTER — APPOINTMENT (EMERGENCY)
Dept: RADIOLOGY | Facility: HOSPITAL | Age: 61
End: 2021-10-02
Payer: COMMERCIAL

## 2021-10-02 ENCOUNTER — HOSPITAL ENCOUNTER (EMERGENCY)
Facility: HOSPITAL | Age: 61
Discharge: HOME | End: 2021-10-02
Attending: EMERGENCY MEDICINE
Payer: COMMERCIAL

## 2021-10-02 VITALS
SYSTOLIC BLOOD PRESSURE: 182 MMHG | BODY MASS INDEX: 42.57 KG/M2 | TEMPERATURE: 98.1 F | WEIGHT: 280 LBS | OXYGEN SATURATION: 91 % | HEART RATE: 69 BPM | DIASTOLIC BLOOD PRESSURE: 100 MMHG | RESPIRATION RATE: 18 BRPM

## 2021-10-02 DIAGNOSIS — I10 HYPERTENSION: ICD-10-CM

## 2021-10-02 DIAGNOSIS — L03.116 CELLULITIS OF LEFT LOWER EXTREMITY: Primary | ICD-10-CM

## 2021-10-02 LAB
ANION GAP SERPL CALCULATED.3IONS-SCNC: 4 MMOL/L (ref 4–13)
BASOPHILS # BLD AUTO: 0.07 THOUSANDS/ΜL (ref 0–0.1)
BASOPHILS NFR BLD AUTO: 1 % (ref 0–1)
BUN SERPL-MCNC: 15 MG/DL (ref 5–25)
CALCIUM SERPL-MCNC: 9.7 MG/DL (ref 8.3–10.1)
CHLORIDE SERPL-SCNC: 105 MMOL/L (ref 100–108)
CO2 SERPL-SCNC: 31 MMOL/L (ref 21–32)
CREAT SERPL-MCNC: 1.15 MG/DL (ref 0.6–1.3)
EOSINOPHIL # BLD AUTO: 0.36 THOUSAND/ΜL (ref 0–0.61)
EOSINOPHIL NFR BLD AUTO: 4 % (ref 0–6)
ERYTHROCYTE [DISTWIDTH] IN BLOOD BY AUTOMATED COUNT: 12.9 % (ref 11.6–15.1)
GFR SERPL CREATININE-BSD FRML MDRD: 68 ML/MIN/1.73SQ M
GLUCOSE SERPL-MCNC: 94 MG/DL (ref 65–140)
HCT VFR BLD AUTO: 50 % (ref 36.5–49.3)
HGB BLD-MCNC: 15.8 G/DL (ref 12–17)
IMM GRANULOCYTES # BLD AUTO: 0.02 THOUSAND/UL (ref 0–0.2)
IMM GRANULOCYTES NFR BLD AUTO: 0 % (ref 0–2)
LYMPHOCYTES # BLD AUTO: 1.52 THOUSANDS/ΜL (ref 0.6–4.47)
LYMPHOCYTES NFR BLD AUTO: 17 % (ref 14–44)
MCH RBC QN AUTO: 28.8 PG (ref 26.8–34.3)
MCHC RBC AUTO-ENTMCNC: 31.6 G/DL (ref 31.4–37.4)
MCV RBC AUTO: 91 FL (ref 82–98)
MONOCYTES # BLD AUTO: 0.75 THOUSAND/ΜL (ref 0.17–1.22)
MONOCYTES NFR BLD AUTO: 8 % (ref 4–12)
NEUTROPHILS # BLD AUTO: 6.34 THOUSANDS/ΜL (ref 1.85–7.62)
NEUTS SEG NFR BLD AUTO: 70 % (ref 43–75)
NRBC BLD AUTO-RTO: 0 /100 WBCS
PLATELET # BLD AUTO: 182 THOUSANDS/UL (ref 149–390)
PMV BLD AUTO: 10.4 FL (ref 8.9–12.7)
POTASSIUM SERPL-SCNC: 4.1 MMOL/L (ref 3.5–5.3)
RBC # BLD AUTO: 5.48 MILLION/UL (ref 3.88–5.62)
SODIUM SERPL-SCNC: 140 MMOL/L (ref 136–145)
WBC # BLD AUTO: 9.06 THOUSAND/UL (ref 4.31–10.16)

## 2021-10-02 PROCEDURE — 36415 COLL VENOUS BLD VENIPUNCTURE: CPT | Performed by: EMERGENCY MEDICINE

## 2021-10-02 PROCEDURE — 99283 EMERGENCY DEPT VISIT LOW MDM: CPT

## 2021-10-02 PROCEDURE — 73590 X-RAY EXAM OF LOWER LEG: CPT

## 2021-10-02 PROCEDURE — 80048 BASIC METABOLIC PNL TOTAL CA: CPT | Performed by: EMERGENCY MEDICINE

## 2021-10-02 PROCEDURE — 96375 TX/PRO/DX INJ NEW DRUG ADDON: CPT

## 2021-10-02 PROCEDURE — 85025 COMPLETE CBC W/AUTO DIFF WBC: CPT | Performed by: EMERGENCY MEDICINE

## 2021-10-02 PROCEDURE — 96365 THER/PROPH/DIAG IV INF INIT: CPT

## 2021-10-02 PROCEDURE — 99285 EMERGENCY DEPT VISIT HI MDM: CPT | Performed by: EMERGENCY MEDICINE

## 2021-10-02 RX ORDER — KETOROLAC TROMETHAMINE 30 MG/ML
15 INJECTION, SOLUTION INTRAMUSCULAR; INTRAVENOUS ONCE
Status: COMPLETED | OUTPATIENT
Start: 2021-10-02 | End: 2021-10-02

## 2021-10-02 RX ORDER — CLINDAMYCIN HYDROCHLORIDE 150 MG/1
450 CAPSULE ORAL EVERY 8 HOURS SCHEDULED
Qty: 63 CAPSULE | Refills: 0 | Status: SHIPPED | OUTPATIENT
Start: 2021-10-02 | End: 2021-10-09

## 2021-10-02 RX ADMIN — CEFTRIAXONE 1000 MG: 1 INJECTION, POWDER, FOR SOLUTION INTRAMUSCULAR; INTRAVENOUS at 19:10

## 2021-10-02 RX ADMIN — KETOROLAC TROMETHAMINE 15 MG: 30 INJECTION, SOLUTION INTRAMUSCULAR; INTRAVENOUS at 17:58

## 2021-10-13 ENCOUNTER — OFFICE VISIT (OUTPATIENT)
Dept: INTERNAL MEDICINE CLINIC | Facility: CLINIC | Age: 61
End: 2021-10-13

## 2021-10-13 VITALS
HEART RATE: 69 BPM | DIASTOLIC BLOOD PRESSURE: 71 MMHG | BODY MASS INDEX: 45.22 KG/M2 | TEMPERATURE: 98.1 F | WEIGHT: 297.4 LBS | OXYGEN SATURATION: 89 % | SYSTOLIC BLOOD PRESSURE: 121 MMHG

## 2021-10-13 DIAGNOSIS — J44.9 COPD (CHRONIC OBSTRUCTIVE PULMONARY DISEASE) (HCC): ICD-10-CM

## 2021-10-13 DIAGNOSIS — I10 ESSENTIAL HYPERTENSION: ICD-10-CM

## 2021-10-13 DIAGNOSIS — M79.89 BILATERAL SWELLING OF FEET: ICD-10-CM

## 2021-10-13 DIAGNOSIS — I10 HYPERTENSION, UNSPECIFIED TYPE: Primary | ICD-10-CM

## 2021-10-13 DIAGNOSIS — I50.32 CHRONIC DIASTOLIC HEART FAILURE (HCC): ICD-10-CM

## 2021-10-13 DIAGNOSIS — J44.9 CHRONIC OBSTRUCTIVE PULMONARY DISEASE, UNSPECIFIED COPD TYPE (HCC): ICD-10-CM

## 2021-10-13 DIAGNOSIS — G47.33 OSA (OBSTRUCTIVE SLEEP APNEA): ICD-10-CM

## 2021-10-13 DIAGNOSIS — E78.5 HYPERLIPIDEMIA, UNSPECIFIED HYPERLIPIDEMIA TYPE: ICD-10-CM

## 2021-10-13 PROCEDURE — 3074F SYST BP LT 130 MM HG: CPT | Performed by: INTERNAL MEDICINE

## 2021-10-13 PROCEDURE — 3078F DIAST BP <80 MM HG: CPT | Performed by: INTERNAL MEDICINE

## 2021-10-13 PROCEDURE — 99213 OFFICE O/P EST LOW 20 MIN: CPT | Performed by: INTERNAL MEDICINE

## 2021-10-13 RX ORDER — LOSARTAN POTASSIUM 100 MG/1
100 TABLET ORAL DAILY
Qty: 90 TABLET | Refills: 6 | Status: SHIPPED | OUTPATIENT
Start: 2021-10-13

## 2021-10-13 RX ORDER — ASPIRIN 81 MG/1
81 TABLET ORAL DAILY
Qty: 90 TABLET | Refills: 6 | Status: SHIPPED | OUTPATIENT
Start: 2021-10-13

## 2021-10-13 RX ORDER — FLUTICASONE FUROATE AND VILANTEROL 200; 25 UG/1; UG/1
1 POWDER RESPIRATORY (INHALATION) DAILY
Qty: 180 BLISTER | Refills: 6 | Status: SHIPPED | OUTPATIENT
Start: 2021-10-13 | End: 2022-05-25

## 2021-10-13 RX ORDER — ALBUTEROL SULFATE 90 UG/1
2 AEROSOL, METERED RESPIRATORY (INHALATION) EVERY 6 HOURS PRN
Qty: 18 G | Refills: 5 | Status: SHIPPED | OUTPATIENT
Start: 2021-10-13 | End: 2022-03-01 | Stop reason: SDUPTHER

## 2021-10-13 RX ORDER — CARVEDILOL 3.12 MG/1
3.12 TABLET ORAL 2 TIMES DAILY WITH MEALS
Qty: 180 TABLET | Refills: 3 | Status: SHIPPED | OUTPATIENT
Start: 2021-10-13

## 2021-10-13 RX ORDER — FUROSEMIDE 20 MG/1
40 TABLET ORAL DAILY PRN
Qty: 30 TABLET | Refills: 2 | Status: SHIPPED | OUTPATIENT
Start: 2021-10-13 | End: 2021-10-13 | Stop reason: ALTCHOICE

## 2021-10-13 RX ORDER — FUROSEMIDE 40 MG/1
40 TABLET ORAL 2 TIMES DAILY
Qty: 180 TABLET | Refills: 5 | Status: SHIPPED | OUTPATIENT
Start: 2021-10-13 | End: 2022-01-11

## 2021-10-13 RX ORDER — DOXAZOSIN MESYLATE 4 MG/1
4 TABLET ORAL DAILY
Qty: 90 TABLET | Refills: 6 | Status: SHIPPED | OUTPATIENT
Start: 2021-10-13

## 2021-10-13 RX ORDER — ATORVASTATIN CALCIUM 40 MG/1
40 TABLET, FILM COATED ORAL
Qty: 90 TABLET | Refills: 6 | Status: SHIPPED | OUTPATIENT
Start: 2021-10-13

## 2021-12-07 ENCOUNTER — HOSPITAL ENCOUNTER (EMERGENCY)
Facility: HOSPITAL | Age: 61
Discharge: HOME | End: 2021-12-07
Attending: EMERGENCY MEDICINE | Admitting: EMERGENCY MEDICINE
Payer: COMMERCIAL

## 2021-12-07 VITALS
DIASTOLIC BLOOD PRESSURE: 113 MMHG | RESPIRATION RATE: 18 BRPM | HEART RATE: 74 BPM | SYSTOLIC BLOOD PRESSURE: 191 MMHG | OXYGEN SATURATION: 92 % | TEMPERATURE: 98.6 F

## 2021-12-07 DIAGNOSIS — S81.802A OPEN WOUND OF LEFT LOWER EXTREMITY, INITIAL ENCOUNTER: Primary | ICD-10-CM

## 2021-12-07 PROCEDURE — 99283 EMERGENCY DEPT VISIT LOW MDM: CPT

## 2021-12-07 PROCEDURE — 99284 EMERGENCY DEPT VISIT MOD MDM: CPT | Performed by: EMERGENCY MEDICINE

## 2021-12-07 PROCEDURE — 96372 THER/PROPH/DIAG INJ SC/IM: CPT

## 2021-12-07 RX ORDER — KETOROLAC TROMETHAMINE 30 MG/ML
15 INJECTION, SOLUTION INTRAMUSCULAR; INTRAVENOUS ONCE
Status: COMPLETED | OUTPATIENT
Start: 2021-12-07 | End: 2021-12-07

## 2021-12-07 RX ADMIN — KETOROLAC TROMETHAMINE 15 MG: 30 INJECTION, SOLUTION INTRAMUSCULAR at 16:44

## 2021-12-16 ENCOUNTER — OFFICE VISIT (OUTPATIENT)
Dept: INTERNAL MEDICINE CLINIC | Facility: CLINIC | Age: 61
End: 2021-12-16

## 2021-12-16 VITALS
TEMPERATURE: 98.4 F | BODY MASS INDEX: 45.01 KG/M2 | WEIGHT: 296 LBS | DIASTOLIC BLOOD PRESSURE: 82 MMHG | OXYGEN SATURATION: 90 % | HEART RATE: 67 BPM | SYSTOLIC BLOOD PRESSURE: 173 MMHG

## 2021-12-16 DIAGNOSIS — S81.802S: Primary | ICD-10-CM

## 2021-12-16 PROCEDURE — 99213 OFFICE O/P EST LOW 20 MIN: CPT | Performed by: INTERNAL MEDICINE

## 2021-12-16 RX ORDER — CEPHALEXIN 500 MG/1
500 CAPSULE ORAL EVERY 6 HOURS SCHEDULED
Qty: 56 CAPSULE | Refills: 0 | Status: SHIPPED | OUTPATIENT
Start: 2021-12-16 | End: 2021-12-16

## 2021-12-16 RX ORDER — SULFAMETHOXAZOLE AND TRIMETHOPRIM 800; 160 MG/1; MG/1
1 TABLET ORAL EVERY 12 HOURS SCHEDULED
Qty: 28 TABLET | Refills: 0 | Status: SHIPPED | OUTPATIENT
Start: 2021-12-16 | End: 2021-12-16 | Stop reason: SDUPTHER

## 2021-12-16 RX ORDER — SULFAMETHOXAZOLE AND TRIMETHOPRIM 800; 160 MG/1; MG/1
1 TABLET ORAL EVERY 12 HOURS SCHEDULED
Qty: 28 TABLET | Refills: 0 | Status: SHIPPED | OUTPATIENT
Start: 2021-12-16 | End: 2021-12-30

## 2021-12-16 RX ORDER — OXYCODONE HYDROCHLORIDE AND ACETAMINOPHEN 5; 325 MG/1; MG/1
1 TABLET ORAL EVERY 6 HOURS PRN
Qty: 20 TABLET | Refills: 0 | Status: SHIPPED | OUTPATIENT
Start: 2021-12-16 | End: 2021-12-20 | Stop reason: SDUPTHER

## 2021-12-16 RX ORDER — CEPHALEXIN 500 MG/1
500 CAPSULE ORAL EVERY 6 HOURS SCHEDULED
Qty: 56 CAPSULE | Refills: 0 | Status: SHIPPED | OUTPATIENT
Start: 2021-12-16 | End: 2021-12-30

## 2021-12-20 ENCOUNTER — CONSULT (OUTPATIENT)
Dept: MULTI SPECIALTY CLINIC | Facility: CLINIC | Age: 61
End: 2021-12-20

## 2021-12-20 VITALS
TEMPERATURE: 97.9 F | HEART RATE: 66 BPM | BODY MASS INDEX: 44.71 KG/M2 | HEIGHT: 68 IN | DIASTOLIC BLOOD PRESSURE: 94 MMHG | SYSTOLIC BLOOD PRESSURE: 183 MMHG | WEIGHT: 295 LBS

## 2021-12-20 DIAGNOSIS — S81.802S: Primary | ICD-10-CM

## 2021-12-20 DIAGNOSIS — S81.802S: ICD-10-CM

## 2021-12-20 DIAGNOSIS — R60.0 EDEMA OF BOTH LOWER EXTREMITIES: ICD-10-CM

## 2021-12-20 PROCEDURE — 99213 OFFICE O/P EST LOW 20 MIN: CPT | Performed by: PODIATRIST

## 2021-12-20 PROCEDURE — 3008F BODY MASS INDEX DOCD: CPT | Performed by: PODIATRIST

## 2021-12-20 PROCEDURE — 3077F SYST BP >= 140 MM HG: CPT | Performed by: PODIATRIST

## 2021-12-20 PROCEDURE — 3080F DIAST BP >= 90 MM HG: CPT | Performed by: PODIATRIST

## 2021-12-21 RX ORDER — OXYCODONE HYDROCHLORIDE AND ACETAMINOPHEN 5; 325 MG/1; MG/1
1 TABLET ORAL EVERY 6 HOURS PRN
Qty: 20 TABLET | Refills: 0 | Status: SHIPPED | OUTPATIENT
Start: 2021-12-21

## 2022-01-01 ENCOUNTER — OFFICE VISIT (OUTPATIENT)
Dept: WOUND CARE | Facility: HOSPITAL | Age: 62
End: 2022-01-01
Payer: COMMERCIAL

## 2022-01-01 ENCOUNTER — TELEPHONE (OUTPATIENT)
Dept: WOUND CARE | Facility: HOSPITAL | Age: 62
End: 2022-01-01

## 2022-01-01 VITALS
RESPIRATION RATE: 18 BRPM | DIASTOLIC BLOOD PRESSURE: 73 MMHG | TEMPERATURE: 97.6 F | SYSTOLIC BLOOD PRESSURE: 146 MMHG | HEART RATE: 68 BPM

## 2022-01-01 DIAGNOSIS — Z72.0 TOBACCO USE: ICD-10-CM

## 2022-01-01 DIAGNOSIS — M79.89 PAIN AND SWELLING OF LEFT LOWER EXTREMITY: ICD-10-CM

## 2022-01-01 DIAGNOSIS — M79.605 PAIN AND SWELLING OF LEFT LOWER EXTREMITY: ICD-10-CM

## 2022-01-01 DIAGNOSIS — I87.332 CHRONIC VENOUS HYPERTENSION (IDIOPATHIC) WITH ULCER AND INFLAMMATION OF LEFT LOWER EXTREMITY (HCC): Primary | ICD-10-CM

## 2022-01-01 DIAGNOSIS — R60.0 EDEMA OF BOTH LOWER EXTREMITIES: ICD-10-CM

## 2022-01-01 DIAGNOSIS — L97.929 CHRONIC VENOUS HYPERTENSION (IDIOPATHIC) WITH ULCER AND INFLAMMATION OF LEFT LOWER EXTREMITY (HCC): Primary | ICD-10-CM

## 2022-01-01 PROCEDURE — 99214 OFFICE O/P EST MOD 30 MIN: CPT | Performed by: ORTHOPAEDIC SURGERY

## 2022-01-01 PROCEDURE — 99213 OFFICE O/P EST LOW 20 MIN: CPT | Performed by: ORTHOPAEDIC SURGERY

## 2022-01-01 RX ORDER — LIDOCAINE 40 MG/G
CREAM TOPICAL ONCE
Status: COMPLETED | OUTPATIENT
Start: 2022-01-01 | End: 2022-01-01

## 2022-01-01 RX ADMIN — LIDOCAINE 1 APPLICATION: 40 CREAM TOPICAL at 13:41

## 2022-02-15 ENCOUNTER — APPOINTMENT (EMERGENCY)
Dept: NON INVASIVE DIAGNOSTICS | Facility: HOSPITAL | Age: 62
End: 2022-02-15
Payer: COMMERCIAL

## 2022-02-15 ENCOUNTER — HOSPITAL ENCOUNTER (EMERGENCY)
Facility: HOSPITAL | Age: 62
Discharge: HOME | End: 2022-02-15
Attending: EMERGENCY MEDICINE | Admitting: EMERGENCY MEDICINE
Payer: COMMERCIAL

## 2022-02-15 ENCOUNTER — APPOINTMENT (EMERGENCY)
Dept: RADIOLOGY | Facility: HOSPITAL | Age: 62
End: 2022-02-15
Payer: COMMERCIAL

## 2022-02-15 VITALS
DIASTOLIC BLOOD PRESSURE: 95 MMHG | OXYGEN SATURATION: 93 % | WEIGHT: 284.4 LBS | TEMPERATURE: 97.8 F | BODY MASS INDEX: 42.12 KG/M2 | HEIGHT: 69 IN | SYSTOLIC BLOOD PRESSURE: 205 MMHG | HEART RATE: 69 BPM | RESPIRATION RATE: 18 BRPM

## 2022-02-15 DIAGNOSIS — L03.115 CELLULITIS OF RIGHT FOOT: Primary | ICD-10-CM

## 2022-02-15 LAB
ANION GAP SERPL CALCULATED.3IONS-SCNC: 3 MMOL/L (ref 4–13)
BASOPHILS # BLD AUTO: 0.06 THOUSANDS/ΜL (ref 0–0.1)
BASOPHILS NFR BLD AUTO: 1 % (ref 0–1)
BUN SERPL-MCNC: 18 MG/DL (ref 5–25)
CALCIUM SERPL-MCNC: 9.1 MG/DL (ref 8.3–10.1)
CHLORIDE SERPL-SCNC: 106 MMOL/L (ref 100–108)
CO2 SERPL-SCNC: 29 MMOL/L (ref 21–32)
CREAT SERPL-MCNC: 1.23 MG/DL (ref 0.6–1.3)
D DIMER PPP FEU-MCNC: 1 UG/ML FEU
EOSINOPHIL # BLD AUTO: 0.35 THOUSAND/ΜL (ref 0–0.61)
EOSINOPHIL NFR BLD AUTO: 5 % (ref 0–6)
ERYTHROCYTE [DISTWIDTH] IN BLOOD BY AUTOMATED COUNT: 13.2 % (ref 11.6–15.1)
GFR SERPL CREATININE-BSD FRML MDRD: 62 ML/MIN/1.73SQ M
GLUCOSE SERPL-MCNC: 87 MG/DL (ref 65–140)
HCT VFR BLD AUTO: 45.8 % (ref 36.5–49.3)
HGB BLD-MCNC: 14.2 G/DL (ref 12–17)
IMM GRANULOCYTES # BLD AUTO: 0.02 THOUSAND/UL (ref 0–0.2)
IMM GRANULOCYTES NFR BLD AUTO: 0 % (ref 0–2)
LYMPHOCYTES # BLD AUTO: 1.14 THOUSANDS/ΜL (ref 0.6–4.47)
LYMPHOCYTES NFR BLD AUTO: 15 % (ref 14–44)
MCH RBC QN AUTO: 28.5 PG (ref 26.8–34.3)
MCHC RBC AUTO-ENTMCNC: 31 G/DL (ref 31.4–37.4)
MCV RBC AUTO: 92 FL (ref 82–98)
MONOCYTES # BLD AUTO: 0.87 THOUSAND/ΜL (ref 0.17–1.22)
MONOCYTES NFR BLD AUTO: 11 % (ref 4–12)
NEUTROPHILS # BLD AUTO: 5.4 THOUSANDS/ΜL (ref 1.85–7.62)
NEUTS SEG NFR BLD AUTO: 68 % (ref 43–75)
NRBC BLD AUTO-RTO: 0 /100 WBCS
PLATELET # BLD AUTO: 232 THOUSANDS/UL (ref 149–390)
PMV BLD AUTO: 10.3 FL (ref 8.9–12.7)
POTASSIUM SERPL-SCNC: 4.3 MMOL/L (ref 3.5–5.3)
RBC # BLD AUTO: 4.98 MILLION/UL (ref 3.88–5.62)
SODIUM SERPL-SCNC: 138 MMOL/L (ref 136–145)
WBC # BLD AUTO: 7.84 THOUSAND/UL (ref 4.31–10.16)

## 2022-02-15 PROCEDURE — 73590 X-RAY EXAM OF LOWER LEG: CPT

## 2022-02-15 PROCEDURE — 93971 EXTREMITY STUDY: CPT

## 2022-02-15 PROCEDURE — 36415 COLL VENOUS BLD VENIPUNCTURE: CPT

## 2022-02-15 PROCEDURE — 85025 COMPLETE CBC W/AUTO DIFF WBC: CPT

## 2022-02-15 PROCEDURE — 85379 FIBRIN DEGRADATION QUANT: CPT

## 2022-02-15 PROCEDURE — 99284 EMERGENCY DEPT VISIT MOD MDM: CPT

## 2022-02-15 PROCEDURE — 93971 EXTREMITY STUDY: CPT | Performed by: SURGERY

## 2022-02-15 PROCEDURE — 80048 BASIC METABOLIC PNL TOTAL CA: CPT

## 2022-02-15 PROCEDURE — 73630 X-RAY EXAM OF FOOT: CPT

## 2022-02-15 PROCEDURE — 99284 EMERGENCY DEPT VISIT MOD MDM: CPT | Performed by: EMERGENCY MEDICINE

## 2022-02-15 RX ORDER — CEPHALEXIN 500 MG/1
500 CAPSULE ORAL EVERY 6 HOURS SCHEDULED
Qty: 28 CAPSULE | Refills: 0 | Status: SHIPPED | OUTPATIENT
Start: 2022-02-15 | End: 2022-02-22

## 2022-02-15 RX ORDER — SULFAMETHOXAZOLE AND TRIMETHOPRIM 800; 160 MG/1; MG/1
1 TABLET ORAL 2 TIMES DAILY
Qty: 14 TABLET | Refills: 0 | Status: SHIPPED | OUTPATIENT
Start: 2022-02-15 | End: 2022-02-15 | Stop reason: SDUPTHER

## 2022-02-15 RX ORDER — SULFAMETHOXAZOLE AND TRIMETHOPRIM 800; 160 MG/1; MG/1
1 TABLET ORAL 2 TIMES DAILY
Qty: 14 TABLET | Refills: 0 | Status: SHIPPED | OUTPATIENT
Start: 2022-02-15 | End: 2022-02-22

## 2022-02-15 RX ORDER — CEPHALEXIN 500 MG/1
500 CAPSULE ORAL EVERY 6 HOURS SCHEDULED
Qty: 28 CAPSULE | Refills: 0 | Status: SHIPPED | OUTPATIENT
Start: 2022-02-15 | End: 2022-02-15 | Stop reason: SDUPTHER

## 2022-02-15 NOTE — ED ATTENDING ATTESTATION
2/15/2022  IEvans MD, saw and evaluated the patient  I have discussed the patient with the resident/non-physician practitioner and agree with the resident's/non-physician practitioner's findings, Plan of Care, and MDM as documented in the resident's/non-physician practitioner's note, except where noted  All available labs and Radiology studies were reviewed  I was present for key portions of any procedure(s) performed by the resident/non-physician practitioner and I was immediately available to provide assistance  At this point I agree with the current assessment done in the Emergency Department  I have conducted an independent evaluation of this patient a history and physical is as follows:    Patient presents the emergency department with a chief complaint of swelling over the dorsum of his his left foot for the last 3 weeks  The patient reports over the last 2 weeks this has been fairly stable  The patient believes it was due to gout but denies having any pain or tenderness in the foot  The patient denies change in his chronic left lower leg wound but the patient refused to allow me to remove the dressing to examine the leg  The patient denies any injury  The patient denies any systemic symptoms including fever, weakness, or nausea or vomiting  Physical exam demonstrates a male in no acute distress  HEENT exam is normal   Lungs are clear with equal breath sounds  The heart had a regular rate rhythm  The abdomen is soft and nontender  The left lower extremity is wrapped in a dressing to the ankle  There is mild-to-moderate swelling over the dorsum of the left foot that is not localized  The area is not tender  The area is not warm  The area is only slightly erythematous  There is no point tenderness over the great toe metatarsophalangeal joint  The ankle is nontender  The patient refused to allow me to remove the dressing to examine his left lower extremity    The right lower extremity had mild edema but no erythema or warmth  ED Course     2/21/22: Diagnosis of Cellulitis of Right Foot entered in error  Correct diagnosis was Cellulitis of Left Foot      Critical Care Time  Procedures

## 2022-02-15 NOTE — DISCHARGE INSTRUCTIONS
You are choosing to leave AMA  Please take the antibiotics prescribed  We recommend you stay in hospital for treatment, you are welcome to return at any time

## 2022-02-15 NOTE — ED PROVIDER NOTES
History  Chief Complaint   Patient presents with    Foot Swelling     Patient reports left foot swelling r/t gout and arthritis; states that he wants it drained      Patient is a 58 y/o M with PMH gout, diastolic CHF presenting with left foot swelling  Pt states swelling started 3 days ago and believes it is due to gout which he has had episodes of before  Swelling has become increasingly more painful and red on the top of his left foot  States this feels just like his prior episodes  States he has had this drained before which provides symptomatic relief  He has a chronic left anterior shin wound for about 4 months that he notes he has been on three separate courses of antibiotics for, has upcoming wound care appointment  States wound appears better  Denies fevers, chills, CP, SOB, N/V/D  Prior to Admission Medications   Prescriptions Last Dose Informant Patient Reported? Taking? [facility] ALLERGY RELIEF 10 MG tablet   Yes No   albuterol (PROVENTIL HFA,VENTOLIN HFA) 90 mcg/act inhaler   No No   Sig: Inhale 2 puffs every 6 (six) hours as needed for wheezing   amLODIPine (NORVASC) 5 mg tablet   No No   Sig: TAKE 1 TABLET BY MOUTH  DAILY   aspirin (ECOTRIN LOW STRENGTH) 81 mg EC tablet   No No   Sig: Take 1 tablet (81 mg total) by mouth daily   atorvastatin (LIPITOR) 40 mg tablet   No No   Sig: Take 1 tablet (40 mg total) by mouth daily at bedtime   carvedilol (Coreg) 3 125 mg tablet   No No   Sig: Take 1 tablet (3 125 mg total) by mouth 2 (two) times a day with meals   doxazosin (CARDURA) 4 mg tablet   No No   Sig: Take 1 tablet (4 mg total) by mouth daily   fluticasone-vilanterol (BREO ELLIPTA) 200-25 MCG/INH inhaler   No No   Sig: Inhale 1 puff daily Rinse mouth after use     furosemide (LASIX) 40 mg tablet   No No   Sig: Take 1 tablet (40 mg total) by mouth 2 (two) times a day   losartan (COZAAR) 100 MG tablet   No No   Sig: Take 1 tablet (100 mg total) by mouth daily   oxyCODONE-acetaminophen (Percocet) 5-325 mg per tablet   No No   Sig: Take 1 tablet by mouth every 6 (six) hours as needed for moderate pain Max Daily Amount: 4 tablets   tiotropium (SPIRIVA RESPIMAT) 2 5 MCG/ACT AERS inhaler   No No   Sig: Inhale 2 puffs daily      Facility-Administered Medications: None       Past Medical History:   Diagnosis Date    CAD (coronary artery disease)     COPD [facility]chronic obstructive pulmonary disease) ([facility] [address] )     Gout     Hyperlipidemia     Hypertension     Orthopnea     last assessed: 16    Pericardial effusion     Sleep apnea        Past Surgical History:   Procedure Laterality Date    CARDIAC CATHETERIZATION         Family History   Problem Relation Age of Onset    Diabetes Father     Hypertension Mother      I have reviewed and agree with the history as documented  E-Cigarette/Vaping    E-Cigarette Use Never User      E-Cigarette/Vaping Substances    Nicotine No     THC No     CBD No     Flavoring No     Other No     Unknown No      Social History     Tobacco Use    Smoking status: Current Every Day Smoker     Packs/day: 0 20     Years: 46 00     Pack years: 9 20     Types: Cigarettes     Last attempt to quit: 2015     Years since quittin 6    Smokeless tobacco: Never Used    Tobacco comment: 1-2 cigarette a day   Vaping Use    Vaping Use: Never used   Substance Use Topics    Alcohol use: No     Comment: quit 3 years ago   Drug use: No        Review of Systems   Constitutional: Negative for chills and fever  HENT: Negative for ear pain and sore throat  Eyes: Negative for pain and visual disturbance  Respiratory: Negative for cough and shortness of breath  Cardiovascular: Positive for leg swelling  Negative for chest pain and palpitations  Gastrointestinal: Negative for abdominal pain and vomiting  Genitourinary: Negative for dysuria and hematuria  Musculoskeletal: Negative for arthralgias and back pain  Skin: Positive for color change and wound   Negative for rash    Neurological: Negative for seizures and syncope  All other systems reviewed and are negative  Physical Exam  ED Triage Vitals [02/15/22 1123]   Temperature Pulse Respirations Blood Pressure SpO2   97 8 °F (36 6 °C) 69 18 (!) 197/100 95 %      Temp Source Heart Rate Source Patient Position - Orthostatic VS BP Location FiO2 (%)   Tympanic Monitor Sitting Left arm --      Pain Score       5             Orthostatic Vital Signs  Vitals:    02/15/22 1123 02/15/22 1233   BP: (!) 197/100 (!) 205/95   Pulse: 69 69   Patient Position - Orthostatic VS: Sitting Sitting       Physical Exam  Vitals and nursing note reviewed  Constitutional:       General: He is not in acute distress  Appearance: He is well-developed  HENT:      Head: Normocephalic and atraumatic  Right Ear: External ear normal       Left Ear: External ear normal       Mouth/Throat:      Pharynx: Oropharynx is clear  No oropharyngeal exudate  Eyes:      Extraocular Movements: Extraocular movements intact  Conjunctiva/sclera: Conjunctivae normal       Pupils: Pupils are equal, round, and reactive to light  Cardiovascular:      Rate and Rhythm: Normal rate and regular rhythm  Pulses: Normal pulses  Heart sounds: Normal heart sounds  No murmur heard  No friction rub  No gallop  Pulmonary:      Effort: Pulmonary effort is normal  No respiratory distress  Breath sounds: Normal breath sounds  No wheezing, rhonchi or rales  Abdominal:      Palpations: Abdomen is soft  Tenderness: There is no abdominal tenderness  There is no guarding or rebound  Musculoskeletal:      Cervical back: Normal range of motion and neck supple  Right lower leg: No edema  Left lower leg: Edema present  Comments: Chronic left anterior shin wound, wrapped, pt refusing to undo wrapping for evaluation  Erythema and edema of dorsal left foot  Blanching erythema   Full ROM of ankle, neurovascularly intact   Skin: General: Skin is warm and dry  Capillary Refill: Capillary refill takes less than 2 seconds  Findings: Erythema present  Comments: Pt refusing evaluation of wrapped left shin wound  Neurological:      General: No focal deficit present  Mental Status: He is alert and oriented to person, place, and time           ED Medications  Medications - No data to display    Diagnostic Studies  Results Reviewed     Procedure Component Value Units Date/Time    D-Dimer [[id number]]  (Abnormal) Collected: 02/15/22 1303    Lab Status: Final result Specimen: Blood from Arm, Right Updated: 02/15/22 1346     D-Dimer, Quant 1 00 ug/ml FEU     Basic metabolic panel [[id number]]  (Abnormal) Collected: 02/15/22 1303    Lab Status: Final result Specimen: Blood from Arm, Right Updated: 02/15/22 1341     Sodium 138 mmol/L      Potassium 4 3 mmol/L      Chloride 106 mmol/L      CO2 29 mmol/L      ANION GAP 3 mmol/L      BUN 18 mg/dL      Creatinine 1 23 mg/dL      Glucose 87 mg/dL      Calcium 9 1 mg/dL      eGFR 62 ml/min/1 73sq m     Narrative:      [place] guidelines for Chronic Kidney Disease (CKD):     Stage 1 with normal or high GFR (GFR > 90 mL/min/1 73 square meters)    Stage 2 Mild CKD (GFR = 60-89 mL/min/1 73 square meters)    Stage 3A Moderate CKD (GFR = 45-59 mL/min/1 73 square meters)    Stage 3B Moderate CKD (GFR = 30-44 mL/min/1 73 square meters)    Stage 4 Severe CKD (GFR = 15-29 mL/min/1 73 square meters)    Stage 5 End Stage CKD (GFR <15 mL/min/1 73 square meters)  Note: GFR calculation is accurate only with a steady state creatinine    CBC and differential [[id number]]  (Abnormal) Collected: 02/15/22 1303    Lab Status: Final result Specimen: Blood from Arm, Right Updated: 02/15/22 1318     WBC 7 84 Thousand/uL      RBC 4 98 Million/uL      Hemoglobin 14 2 g/dL      Hematocrit 45 8 %      MCV 92 fL      MCH 28 5 pg      MCHC 31 0 g/dL      RDW 13 2 %      MPV 10 3 fL Platelets 828 Thousands/uL      nRBC 0 /100 WBCs      Neutrophils Relative 68 %      Immat GRANS % 0 %      Lymphocytes Relative 15 %      Monocytes Relative 11 %      Eosinophils Relative 5 %      Basophils Relative 1 %      Neutrophils Absolute 5 40 Thousands/µL      Immature Grans Absolute 0 02 Thousand/uL      Lymphocytes Absolute 1 14 Thousands/µL      Monocytes Absolute 0 87 Thousand/µL      Eosinophils Absolute 0 35 Thousand/µL      Basophils Absolute 0 06 Thousands/µL                  VAS lower limb venous duplex study, unilateral/limited   Final Result by Geoff Thayer MD (02/15 1444)      XR tibia fibula 2 views LEFT   Final Result by Olivia Quezada MD (02/15 1654)      No acute osseous abnormality  Shallow ulceration seen at the mid to distal anterior shin  Workstation performed: [id number]         XR foot 3+ views LEFT   Final Result by Olivia Quezada MD (02/15 1656)      No acute osseous abnormality  Dorsal soft tissue swelling at the forefoot                 Workstation performed: [id number]               Procedures  Procedures      ED Course  ED Course as of 02/17/22 1240   Tue Feb 15, 2022   1334 WBC: 7 84   1357 D-Dimer, Quant(!): 1 00                                   Wells' Criteria for DVT      Most Recent Value   Wells' Criteria for DVT    Active cancer Treatment or palliation within 6 months 0 Filed at: 02/15/2022 1254   Bedridden recently >3 days or major surgery within 12 weeks 0 Filed at: 02/15/2022 1254   Calf swelling >3 cm compared to the other leg 0 Filed at: 02/15/2022 1254   Entire leg swollen 0 Filed at: 02/15/2022 1254   Collateral (nonvaricose) superficial veins present 0 Filed at: 02/15/2022 1254   Localized tenderness along the deep venous system 0 Filed at: 02/15/2022 1254   Pitting edema, confined to symptomatic leg 1 Filed at: 02/15/2022 1254   Paralysis, paresis, or recent plaster immobilization of the lower extremity 0 Filed at: 02/15/2022 1254   Previously documented DVT 0 Filed at: 02/15/2022 1254   Alternative diagnosis to DVT as likely or more likely 0 Filed at: 02/15/2022 1254   [place] DVT Critera Score 1 Filed at: 02/15/2022 1254          [facility]  Number of Diagnoses or Management Options  Cellulitis of right foot  Diagnosis management comments: Patient is a 58 y/o M presenting with left dorsal foot swelling and erythema  Ddx includes cellulitis vs DVT vs atypical gout given no clear joint space involvement  W/u includes DVT u/s, basic labs  Vascular u/s w/o signs of DVT  Most likely cellulitis with nearby chronic wound  Advised patient be admitted to hospital for antibiotics and close monitoring  Patient stating he wants to go home and "will come back later " Informed him he would be leaving against medical advise even if plan is to come back  Pt understanding, of clear mind to make decision  Signed AMA and informed to come back as soon as possible for admission  Disposition  Final diagnoses:   Cellulitis of right foot     Time reflects when diagnosis was documented in both MDM as applicable and the Disposition within this note     Time User Action Codes Description Comment    2/15/2022  2:49 PM Logan Jeter Add [[id number]] Cellulitis of right foot       ED Disposition     ED Disposition Condition Date/Time Comment    FAUSTINA Chapman Feb 15, 2022  2:51 PM Date: 2/15/2022  Patient: Arabella Florez  Admitted: 2/15/2022 11:31 AM  Attending Provider: Spike Che MD    Arabella Florez or his authorized caregiver has made the decision for the patient to leave the emergency department against the advice of h is attending physician  He or his authorized caregiver has been informed and understands the inherent risks, including death  He or his authorized caregiver has decided to accept the responsibility for this decision  Arabella Florez and all necessary p arties have been advised that he may return for further evaluation or treatment   His condition at time of discharge was stable    Angelika Cerda had current vital signs as follows:  BP (!) 205/95 (BP Location: Right arm)   Pulse 69   Temp 97 8 °F (36  6 °C) (Tympanic)   Resp 18   Ht 5' 9" (1 753 m)   Wt 129 kg (284 lb 6 4 oz)         Follow-up Information     Follow up With Specialties Details Why Contact Info Additional [address] Emergency Department Emergency Medicine   [address]  [address] [facility] Emergency Department, [address], [address] [facility]          Discharge Medication List as of 2/15/2022  2:51 PM      START taking these medications    Details   cephalexin (KEFLEX) 500 mg capsule Take 1 capsule (500 mg total) by mouth every 6 (six) hours for 7 days, Starting Tue 2/15/2022, Until Tue 2/22/2022, Normal      sulfamethoxazole-trimethoprim (BACTRIM DS) 800-160 mg per tablet Take 1 tablet by mouth 2 (two) times a day for 7 days smx-tmp DS (BACTRIM) 800-160 mg tabs (1tab q12 D10), Starting Tue 2/15/2022, Until Tue 2/22/2022, Normal         CONTINUE these medications which have NOT CHANGED    Details   albuterol (PROVENTIL HFA,VENTOLIN HFA) 90 mcg/act inhaler Inhale 2 puffs every 6 (six) hours as needed for wheezing, Starting Wed 10/13/2021, Normal      amLODIPine (NORVASC) 5 mg tablet TAKE 1 TABLET BY MOUTH  DAILY, Normal      aspirin (ECOTRIN LOW STRENGTH) 81 mg EC tablet Take 1 tablet (81 mg total) by mouth daily, Starting Wed 10/13/2021, Normal      atorvastatin (LIPITOR) 40 mg tablet Take 1 tablet (40 mg total) by mouth daily at bedtime, Starting Wed 10/13/2021, Normal      carvedilol (Coreg) 3 125 mg tablet Take 1 tablet (3 125 mg total) by mouth 2 (two) times a day with meals, Starting Wed 10/13/2021, Normal      [facility] ALLERGY RELIEF 10 MG tablet Starting u 9/13/2018, Historical Med      doxazosin (CARDURA) 4 mg tablet Take 1 tablet (4 mg total) by mouth daily, Starting Wed 10/13/2021, Normal fluticasone-vilanterol (BREO ELLIPTA) 200-25 MCG/INH inhaler Inhale 1 puff daily Rinse mouth after use , Starting Wed 10/13/2021, Until Tue 1/11/2022, Normal      furosemide (LASIX) 40 mg tablet Take 1 tablet (40 mg total) by mouth 2 (two) times a day, Starting Wed 10/13/2021, Until Tue 1/11/2022, Normal      losartan (COZAAR) 100 MG tablet Take 1 tablet (100 mg total) by mouth daily, Starting Wed 10/13/2021, Normal      oxyCODONE-acetaminophen (Percocet) 5-325 mg per tablet Take 1 tablet by mouth every 6 (six) hours as needed for moderate pain Max Daily Amount: 4 tablets, Starting Tue 12/21/2021, Normal      tiotropium (SPIRIVA RESPIMAT) 2 5 MCG/ACT AERS inhaler Inhale 2 puffs daily, Starting Wed 10/13/2021, Normal           No discharge procedures on file  PDMP Review       Value Time User    PDMP Reviewed  Yes 6/13/2021  6:28 AM Kvng Parham MD           ED Provider  Attending physically available and evaluated Danae Connelly I managed the patient along with the ED Attending      Electronically Signed by         Rich Huff MD  02/17/22 2626

## 2022-03-01 DIAGNOSIS — J44.9 CHRONIC OBSTRUCTIVE PULMONARY DISEASE, UNSPECIFIED COPD TYPE (HCC): ICD-10-CM

## 2022-03-01 RX ORDER — ALBUTEROL SULFATE 90 UG/1
2 AEROSOL, METERED RESPIRATORY (INHALATION) EVERY 6 HOURS PRN
Qty: 18 G | Refills: 5 | Status: SHIPPED | OUTPATIENT
Start: 2022-03-01

## 2022-03-01 NOTE — TELEPHONE ENCOUNTER
Requested Prescriptions     Pending Prescriptions Disp Refills    albuterol (PROVENTIL HFA,VENTOLIN HFA) 90 mcg/act inhaler 18 g 5     Sig: Inhale 2 puffs every 6 (six) hours as needed for wheezing

## 2022-03-09 ENCOUNTER — TELEPHONE (OUTPATIENT)
Dept: PULMONOLOGY | Facility: CLINIC | Age: 62
End: 2022-03-09

## 2022-03-09 NOTE — TELEPHONE ENCOUNTER
Pt called stating he needs a script for CPAP supplies (mask, hose, tank) sent to [facility] [facility]    Please advise

## 2022-03-24 ENCOUNTER — TELEPHONE (OUTPATIENT)
Dept: PULMONOLOGY | Facility: CLINIC | Age: 62
End: 2022-03-24

## 2022-03-24 DIAGNOSIS — G47.33 OSA (OBSTRUCTIVE SLEEP APNEA): Primary | ICD-10-CM

## 2022-03-24 NOTE — TELEPHONE ENCOUNTER
Jered Patrick from [facility] [address] calling regarding CMN that was faxed on 3/22  He is resending it to the [place] office to have Dr Jorge Gonzales sign and to fax back  Please advise

## 2022-04-19 ENCOUNTER — OFFICE VISIT (OUTPATIENT)
Dept: INTERNAL MEDICINE CLINIC | Facility: CLINIC | Age: 62
End: 2022-04-19

## 2022-04-19 DIAGNOSIS — I10 PRIMARY HYPERTENSION: Primary | ICD-10-CM

## 2022-04-19 PROCEDURE — 99212 OFFICE O/P EST SF 10 MIN: CPT | Performed by: INTERNAL MEDICINE

## 2022-04-19 NOTE — PROGRESS NOTES
[facility]  INTERNAL MEDICINE OFFICE VISIT     PATIENT INFORMATION     Sunil Morrison   59 y o  male   MRN: [id number]    ASSESSMENT/PLAN     1  HTN:  Blood pressure in the clinic was 183/83  His home blood pressure reading has been similar  His current regimen includes amlodipine 5 mg daily, losartan 100 mg daily and Coreg  Will increase amlodipine to 10 mg daily and had spironolactone 25 mg daily  Will also send him for labs for secondary hypertension  Patient was asked to get this lab done today  Will also check BMP in 7 days  Follow-up in 2 weeks  There are no diagnoses linked to this encounter  Schedule a follow-up appointment in 2 weeks  HEALTH MAINTENANCE     Immunization History   Administered Date(s) Administered    COVID-19 PFIZER VACCINE 0 3 ML IM 03/30/2021, 04/22/2021    INFLUENZA 10/09/2009, 11/01/2017    Influenza, seasonal, injectable, preservative free 11/01/2017    Pneumococcal Polysaccharide PPV23 04/18/2018    Tdap 03/11/2016     CHIEF COMPLAINT     No chief complaint on file  HISTORY OF PRESENT ILLNESS     This is 59 year male who is here to follow-up on his hypertension  Patient currently takes amlodipine 5 mg daily, losartan 100 mg daily and Coreg  He admits to being compliant with his medications  He checks his blood pressure at home and it has been mostly between 607-587 systolic  He does not follow low-salt diet  He currently denies having any symptoms including no chest pain, shortness of breath or headache  REVIEW OF SYSTEMS     Review of Systems   Constitutional: Negative for activity change, appetite change, chills and diaphoresis  Respiratory: Negative for apnea, cough, chest tightness and shortness of breath  Cardiovascular: Negative for chest pain, palpitations and leg swelling  Gastrointestinal: Negative for abdominal distention, abdominal pain, constipation and diarrhea       OBJECTIVE   There were no vitals filed for this visit   Physical Exam  Vitals reviewed  Constitutional:       General: He is not in acute distress  Appearance: He is well-developed  He is not diaphoretic  Cardiovascular:      Rate and Rhythm: Normal rate and regular rhythm  Heart sounds: Normal heart sounds  No murmur heard  No friction rub  Pulmonary:      Effort: Pulmonary effort is normal  No respiratory distress  Breath sounds: Normal breath sounds  No stridor  No wheezing  Abdominal:      General: Bowel sounds are normal  There is no distension  Palpations: Abdomen is soft  Tenderness: There is no abdominal tenderness  There is no guarding         CURRENT MEDICATIONS     Current Outpatient Medications:     albuterol (PROVENTIL HFA,VENTOLIN HFA) 90 mcg/act inhaler, Inhale 2 puffs every 6 (six) hours as needed for wheezing, Disp: 18 g, Rfl: 5    amLODIPine (NORVASC) 10 mg tablet, Take 1 tablet (10 mg total) by mouth daily, Disp: 90 tablet, Rfl: 0    aspirin (ECOTRIN LOW STRENGTH) 81 mg EC tablet, Take 1 tablet (81 mg total) by mouth daily, Disp: 90 tablet, Rfl: 6    atorvastatin (LIPITOR) 40 mg tablet, Take 1 tablet (40 mg total) by mouth daily at bedtime, Disp: 90 tablet, Rfl: 6    carvedilol (Coreg) 3 125 mg tablet, Take 1 tablet (3 125 mg total) by mouth 2 (two) times a day with meals, Disp: 180 tablet, Rfl: 3    [employer] ALLERGY RELIEF 10 MG tablet, , Disp: , Rfl:     doxazosin (CARDURA) 4 mg tablet, Take 1 tablet (4 mg total) by mouth daily, Disp: 90 tablet, Rfl: 6    fluticasone-vilanterol (BREO ELLIPTA) 200-25 MCG/INH inhaler, Inhale 1 puff daily Rinse mouth after use , Disp: 180 blister, Rfl: 6    furosemide (LASIX) 40 mg tablet, Take 1 tablet (40 mg total) by mouth 2 (two) times a day, Disp: 180 tablet, Rfl: 5    losartan (COZAAR) 100 MG tablet, Take 1 tablet (100 mg total) by mouth daily, Disp: 90 tablet, Rfl: 6    oxyCODONE-acetaminophen (Percocet) 5-325 mg per tablet, Take 1 tablet by mouth every 6 (six) hours as needed for moderate pain Max Daily Amount: 4 tablets, Disp: 20 tablet, Rfl: 0    spironolactone (ALDACTONE) 25 mg tablet, Take 1 tablet (25 mg total) by mouth daily, Disp: 60 tablet, Rfl: 0    tiotropium (SPIRIVA RESPIMAT) 2 5 MCG/ACT AERS inhaler, Inhale 2 puffs daily, Disp: 12 g, Rfl: 6    PAST MEDICAL & SURGICAL HISTORY     Past Medical History:   Diagnosis Date    CAD (coronary artery disease)     COPD (chronic obstructive pulmonary disease) (HCC)     Gout     Hyperlipidemia     Hypertension     Orthopnea     last assessed: 16    Pericardial effusion     Sleep apnea      Past Surgical History:   Procedure Laterality Date    CARDIAC CATHETERIZATION       SOCIAL & FAMILY HISTORY     Social History     Socioeconomic History    Marital status: Single     Spouse name: Not on file    Number of children: 3    Years of education: Not on file    Highest education level: Not on file   Occupational History    Occupation: Unemployed, not looking for work   Tobacco Use    Smoking status: Current Every Day Smoker     Packs/day: 0 20     Years: 46 00     Pack years: 9 20     Types: Cigarettes     Last attempt to quit: 2015     Years since quittin 8    Smokeless tobacco: Never Used    Tobacco comment: 1-2 cigarette a day   Vaping Use    Vaping Use: Never used   Substance and Sexual Activity    Alcohol use: No     Comment: quit 3 years ago   Drug use: No    Sexual activity: Never     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     Other Topics Concern    Not on file   Social History Narrative     as per [employer]     Social Determinants of Health     Financial Resource Strain: Low Risk     Difficulty of Paying Living Expenses: Not hard at all   Food Insecurity: No Food Insecurity    Worried About Running Out of Food in the Last Year: Never true    To of Food in the Last Year: Never true   Transportation Needs: No Transportation Needs    Lack of Transportation (Medical): No    Lack of Transportation (Non-Medical): No   Physical Activity: Sufficiently Active    Days of Exercise per Week: 7 days    Minutes of Exercise per Session: 30 min   Stress: No Stress Concern Present    Feeling of Stress : Not at all   Social Connections: Moderately Isolated    Frequency of Communication with Friends and Family: Twice a week    Frequency of Social Gatherings with Friends and Family: Once a week    Attends Gnosticist Services: Never    Active Member of Clubs or Organizations: No    Attends Club or Organization Meetings: Never    Marital Status: Living with partner   Intimate Partner Violence: Not At Risk    Fear of Current or Ex-Partner: No    Emotionally Abused: No    Physically Abused: No    Sexually Abused: No   Housing Stability: [address] Unable to Pay for Housing in the Last Year: No    Number of [place] in the Last Year: 1    Unstable Housing in the Last Year: No     Social History     Substance and Sexual Activity   Alcohol Use No    Comment: quit 3 years ago  Social History     Substance and Sexual Activity   Drug Use No     Social History     Tobacco Use   Smoking Status Current Every Day Smoker    Packs/day: 0 20    Years: 46 00    Pack years: 9 20    Types: Cigarettes    Last attempt to quit: 2015    Years since quittin 8   Smokeless Tobacco Never Used   Tobacco Comment    1-2 cigarette a day     Family History   Problem Relation Age of Onset    Diabetes Father     Hypertension Mother      ==  Farida Greer DO  PGY-3  [address] [facility]  [address] [facility] , [address] [address] [facility], [address] [facility]  Office: [phone]  Fax: [phone]

## 2022-04-26 ENCOUNTER — TELEPHONE (OUTPATIENT)
Dept: INTERNAL MEDICINE CLINIC | Facility: CLINIC | Age: 62
End: 2022-04-26

## 2022-04-26 NOTE — TELEPHONE ENCOUNTER
I called and spoke with patient  He is going for his blood work 4/27AM for his follow up appointment on 5/2/22 with Dr Domingo Nab

## 2022-04-28 ENCOUNTER — APPOINTMENT (OUTPATIENT)
Dept: LAB | Facility: CLINIC | Age: 62
End: 2022-04-28
Payer: COMMERCIAL

## 2022-04-28 DIAGNOSIS — I10 PRIMARY HYPERTENSION: ICD-10-CM

## 2022-04-28 LAB
ANION GAP SERPL CALCULATED.3IONS-SCNC: 5 MMOL/L (ref 4–13)
BUN SERPL-MCNC: 16 MG/DL (ref 5–25)
CALCIUM SERPL-MCNC: 9.7 MG/DL (ref 8.3–10.1)
CHLORIDE SERPL-SCNC: 106 MMOL/L (ref 100–108)
CO2 SERPL-SCNC: 29 MMOL/L (ref 21–32)
CORTIS AM PEAK SERPL-MCNC: 18.4 UG/DL (ref 4.2–22.4)
CREAT SERPL-MCNC: 1.41 MG/DL (ref 0.6–1.3)
GFR SERPL CREATININE-BSD FRML MDRD: 52 ML/MIN/1.73SQ M
GLUCOSE P FAST SERPL-MCNC: 104 MG/DL (ref 65–99)
POTASSIUM SERPL-SCNC: 4.3 MMOL/L (ref 3.5–5.3)
SODIUM SERPL-SCNC: 140 MMOL/L (ref 136–145)
TSH SERPL DL<=0.05 MIU/L-ACNC: 3.66 UIU/ML (ref 0.45–4.5)

## 2022-04-28 PROCEDURE — 82088 ASSAY OF ALDOSTERONE: CPT

## 2022-04-28 PROCEDURE — 84443 ASSAY THYROID STIM HORMONE: CPT

## 2022-04-28 PROCEDURE — 82533 TOTAL CORTISOL: CPT

## 2022-04-28 PROCEDURE — 84244 ASSAY OF RENIN: CPT

## 2022-04-28 PROCEDURE — 36415 COLL VENOUS BLD VENIPUNCTURE: CPT

## 2022-04-28 PROCEDURE — 80048 BASIC METABOLIC PNL TOTAL CA: CPT

## 2022-05-02 ENCOUNTER — OFFICE VISIT (OUTPATIENT)
Dept: INTERNAL MEDICINE CLINIC | Facility: CLINIC | Age: 62
End: 2022-05-02

## 2022-05-02 VITALS
TEMPERATURE: 98 F | SYSTOLIC BLOOD PRESSURE: 138 MMHG | RESPIRATION RATE: 16 BRPM | HEART RATE: 73 BPM | WEIGHT: 278 LBS | HEIGHT: 68 IN | DIASTOLIC BLOOD PRESSURE: 80 MMHG | OXYGEN SATURATION: 90 % | BODY MASS INDEX: 42.13 KG/M2

## 2022-05-02 DIAGNOSIS — I10 PRIMARY HYPERTENSION: Primary | ICD-10-CM

## 2022-05-02 PROCEDURE — 3008F BODY MASS INDEX DOCD: CPT | Performed by: INTERNAL MEDICINE

## 2022-05-02 PROCEDURE — 3725F SCREEN DEPRESSION PERFORMED: CPT | Performed by: INTERNAL MEDICINE

## 2022-05-02 PROCEDURE — 99213 OFFICE O/P EST LOW 20 MIN: CPT | Performed by: INTERNAL MEDICINE

## 2022-05-02 NOTE — PROGRESS NOTES
ASSESSMENT/PLAN:    Case and plan discussed with Dr Fernanda Caro     Diagnoses and all orders for this visit:    Primary hypertension  · BP improved today to 138/80  · Baseline Cr 1 1-1 2  Increased to 1 41 in las BMP  · Will continue current regimen with amlodipine 10mg, losartan 100,g, Coreg 3 125mg BID, and spironolactone 25mg  · Will decrease Lasix 40mg BID to once a day giving worsening Cr  Encourage appropriate PO hydration  · Will follow up in 3 months with repeat BMP  · Follow up pending labs for secondary hypertension   -     Basic metabolic panel; Future    Patient was referred for colonoscopy but hasn't scheduled yet  Will follow up in 3 months for AWV and hypertension follow up  Immunization History   Administered Date(s) Administered    COVID-19 PFIZER VACCINE 0 3 ML IM 03/30/2021, 04/22/2021    INFLUENZA 10/09/2009, 11/01/2017    Influenza, seasonal, injectable, preservative free 11/01/2017    Pneumococcal Polysaccharide PPV23 04/18/2018    Tdap 03/11/2016         HISTORY OF PRESENT ILLNESS:    Mr Rubén Hartman is a 75-year-old male with past medical history of DAVID, COPD, HTN, CAD and hyperlipidemia presenting for a 2 week HTN follow up  Patient was last seen on 4/19/22  At that visit, BP was elevated at 183/83  Patient medications were adjusted as follows: amlodipine increased to 10mg daily, started spironolactone 25mg daily and continue losartan 100mg and Coreg 3 125mg  Patient was also sent for blood work for secondary hypertension  Cortisol AM was normal, aldosterone/renin ratio is pending  Today, BP improved to 138/80  Patient reports monitoring BP at home, reports systolic BP 610E  He denies chest pain, SOB, orthopnea and PND and reports leg edema is improved since last visit  Patient denies episodes of hypotension  No there complaints today  Review of Systems   Constitutional: Negative for chills and fever  HENT: Negative for ear pain and sore throat      Eyes: Negative for pain and visual disturbance  Respiratory: Negative for cough and shortness of breath  Cardiovascular: Negative for chest pain and palpitations  Gastrointestinal: Negative for abdominal pain and vomiting  Genitourinary: Negative for dysuria and hematuria  Musculoskeletal: Negative for arthralgias and back pain  Skin: Negative for color change and rash  Neurological: Negative for seizures and syncope  Psychiatric/Behavioral: Negative  All other systems reviewed and are negative  OBJECTIVE:  Vitals:    22 1450   BP: 138/80   BP Location: Right arm   Patient Position: Sitting   Cuff Size: Large   Pulse: 73   Resp: 16   Temp: 98 °F (36 7 °C)   TempSrc: Temporal   SpO2: 90%   Weight: 126 kg (278 lb)   Height: 5' 8" (1 727 m)       Physical Exam  Vitals and nursing note reviewed  Constitutional:       Appearance: He is well-developed  HENT:      Head: Normocephalic and atraumatic  Eyes:      Conjunctiva/sclera: Conjunctivae normal    Cardiovascular:      Rate and Rhythm: Normal rate and regular rhythm  Heart sounds: No murmur heard  Pulmonary:      Effort: Pulmonary effort is normal  No respiratory distress  Breath sounds: Normal breath sounds  Abdominal:      Palpations: Abdomen is soft  Tenderness: There is no abdominal tenderness  Musculoskeletal:      Cervical back: Neck supple  Right lower le+ Edema present  Left lower le+ Edema present  Skin:     General: Skin is warm and dry  Neurological:      Mental Status: He is alert                Current Outpatient Medications:     albuterol (PROVENTIL HFA,VENTOLIN HFA) 90 mcg/act inhaler, Inhale 2 puffs every 6 (six) hours as needed for wheezing, Disp: 18 g, Rfl: 5    amLODIPine (NORVASC) 10 mg tablet, Take 1 tablet (10 mg total) by mouth daily, Disp: 90 tablet, Rfl: 0    aspirin (ECOTRIN LOW STRENGTH) 81 mg EC tablet, Take 1 tablet (81 mg total) by mouth daily, Disp: 90 tablet, Rfl: 6   atorvastatin (LIPITOR) 40 mg tablet, Take 1 tablet (40 mg total) by mouth daily at bedtime, Disp: 90 tablet, Rfl: 6    carvedilol (Coreg) 3 125 mg tablet, Take 1 tablet (3 125 mg total) by mouth 2 (two) times a day with meals, Disp: 180 tablet, Rfl: 3    [employer] ALLERGY RELIEF 10 MG tablet, , Disp: , Rfl:     doxazosin (CARDURA) 4 mg tablet, Take 1 tablet (4 mg total) by mouth daily, Disp: 90 tablet, Rfl: 6    fluticasone-vilanterol (BREO ELLIPTA) 200-25 MCG/INH inhaler, Inhale 1 puff daily Rinse mouth after use , Disp: 180 blister, Rfl: 6    furosemide (LASIX) 40 mg tablet, Take 1 tablet (40 mg total) by mouth 2 (two) times a day, Disp: 180 tablet, Rfl: 5    losartan (COZAAR) 100 MG tablet, Take 1 tablet (100 mg total) by mouth daily, Disp: 90 tablet, Rfl: 6    oxyCODONE-acetaminophen (Percocet) 5-325 mg per tablet, Take 1 tablet by mouth every 6 (six) hours as needed for moderate pain Max Daily Amount: 4 tablets, Disp: 20 tablet, Rfl: 0    spironolactone (ALDACTONE) 25 mg tablet, Take 1 tablet (25 mg total) by mouth daily, Disp: 60 tablet, Rfl: 0    tiotropium (SPIRIVA RESPIMAT) 2 5 MCG/ACT AERS inhaler, Inhale 2 puffs daily, Disp: 12 g, Rfl: 6    Past Medical History:   Diagnosis Date    CAD (coronary artery disease)     COPD (chronic obstructive pulmonary disease) (HCC)     Gout     Hyperlipidemia     Hypertension     Orthopnea     last assessed: 8/17/16    Pericardial effusion     Sleep apnea      Past Surgical History:   Procedure Laterality Date    CARDIAC CATHETERIZATION       Family History   Problem Relation Age of Onset    Diabetes Father     Hypertension Mother      Social History     Socioeconomic History    Marital status: Single     Spouse name: Not on file    Number of children: 3    Years of education: Not on file    Highest education level: Not on file   Occupational History    Occupation: Unemployed, not looking for work   Tobacco Use    Smoking status: Current Every Day Smoker     Packs/day: 0 20     Years: 46 00     Pack years: 9 20     Types: Cigarettes     Last attempt to quit: 2015     Years since quittin 8    Smokeless tobacco: Never Used    Tobacco comment: 1-2 cigarette a day   Vaping Use    Vaping Use: Never used   Substance and Sexual Activity    Alcohol use: No     Comment: quit 3 years ago   Drug use: No    Sexual activity: Never     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[employer]  Other Topics Concern    Not on file   Social History Narrative     as per [employer]     Social Determinants of Health     Financial Resource Strain: Low Risk     Difficulty of Paying Living Expenses: Not hard at all   Food Insecurity: No Food Insecurity    Worried About Running Out of Food in the Last Year: Never true    [address] in the Last Year: Never true   Transportation Needs: No Transportation Needs    Lack of Transportation (Medical): No    Lack of Transportation (Non-Medical): No   Physical Activity: Sufficiently Active    Days of Exercise per Week: 7 days    Minutes of Exercise per Session: 30 min   Stress: No Stress Concern Present    Feeling of Stress : Not at all   Social Connections:  Moderately Isolated    Frequency of Communication with Friends and Family: Twice a week    Frequency of Social Gatherings with Friends and Family: Once a week    Attends Sikh Services: Never    Active Member of Clubs or Organizations: No    Attends Club or Organization Meetings: Never    Marital Status: Living with partner   Intimate Partner Violence: Not At Risk    Fear of Current or Ex-Partner: No    Emotionally Abused: No    Physically Abused: No    Sexually Abused: No   Housing Stability: [address] Unable to Pay for Housing in the Last Year: No    Number of [place] in the Last Year: 1    Unstable Housing in the Last Year: No     Social History     Tobacco Use   Smoking Status Current Every Day Smoker    Packs/day: 0 20    Years: 46 00    Pack years: 9 20    Types: Cigarettes    Last attempt to quit: 2015    Years since quittin 8   Smokeless Tobacco Never Used   Tobacco Comment    1-2 cigarette a day     Social History     Substance and Sexual Activity   Alcohol Use No    Comment: quit 3 years ago  Social History     Substance and Sexual Activity   Drug Use No         Jose C Bergman MD  Internal Medicine Residency, PGY-1  [facility]   [address] [facility]  [address] [facility],[address]  [place], [address] [facility]

## 2022-05-11 LAB
ALDOST SERPL-MCNC: 5.4 NG/DL (ref 0–30)
ALDOST/RENIN PLAS-RTO: 11.9 {RATIO} (ref 0–30)
RENIN PLAS-CCNC: 0.45 NG/ML/HR (ref 0.17–5.38)

## 2022-05-25 ENCOUNTER — OFFICE VISIT (OUTPATIENT)
Dept: WOUND CARE | Facility: HOSPITAL | Age: 62
End: 2022-05-25
Payer: COMMERCIAL

## 2022-05-25 VITALS
DIASTOLIC BLOOD PRESSURE: 85 MMHG | SYSTOLIC BLOOD PRESSURE: 176 MMHG | HEART RATE: 64 BPM | RESPIRATION RATE: 18 BRPM | TEMPERATURE: 97.3 F

## 2022-05-25 DIAGNOSIS — Z72.0 TOBACCO USE: Chronic | ICD-10-CM

## 2022-05-25 DIAGNOSIS — I87.332 CHRONIC VENOUS HYPERTENSION (IDIOPATHIC) WITH ULCER AND INFLAMMATION OF LEFT LOWER EXTREMITY (HCC): Primary | ICD-10-CM

## 2022-05-25 DIAGNOSIS — L97.929 CHRONIC VENOUS HYPERTENSION (IDIOPATHIC) WITH ULCER AND INFLAMMATION OF LEFT LOWER EXTREMITY (HCC): Primary | ICD-10-CM

## 2022-05-25 DIAGNOSIS — R60.0 EDEMA OF BOTH LOWER EXTREMITIES: ICD-10-CM

## 2022-05-25 PROCEDURE — 11042 DBRDMT SUBQ TIS 1ST 20SQCM/<: CPT | Performed by: ORTHOPAEDIC SURGERY

## 2022-05-25 PROCEDURE — 99213 OFFICE O/P EST LOW 20 MIN: CPT | Performed by: ORTHOPAEDIC SURGERY

## 2022-05-25 PROCEDURE — 11045 DBRDMT SUBQ TISS EACH ADDL: CPT | Performed by: ORTHOPAEDIC SURGERY

## 2022-05-25 PROCEDURE — 99204 OFFICE O/P NEW MOD 45 MIN: CPT | Performed by: ORTHOPAEDIC SURGERY

## 2022-05-25 RX ORDER — LIDOCAINE HYDROCHLORIDE 40 MG/ML
5 SOLUTION TOPICAL ONCE
Status: COMPLETED | OUTPATIENT
Start: 2022-05-25 | End: 2022-05-25

## 2022-05-25 RX ADMIN — LIDOCAINE HYDROCHLORIDE 5 ML: 40 SOLUTION TOPICAL at 10:07

## 2022-05-25 NOTE — PROGRESS NOTES
Patient ID: Gary Reagan is a 59 y o  male Date of Birth 1960       Chief Complaint   Patient presents with    New Patient Visit     Chronic open wounds to the left lower leg  Allergies:  Patient has no known allergies  Diagnosis:      Diagnosis ICD-10-CM Associated Orders   1  Chronic venous hypertension (idiopathic) with ulcer and inflammation of left lower extremity (HCC)  I87 332 lidocaine (XYLOCAINE) 4 % topical solution 5 mL    L97 929 Wound cleansing and dressings     VAS lower limb arterial duplex, complete bilateral     VAS reflux lower limb venous duplex study with reflux assessment, complete bilateral   2  Tobacco use  Z72 0    3  Edema of both lower extremities  R60 0            Assessment and Plan :    Initial Evaluation LLE Venous Ulcer  No signs of infection today   Debrided as below   Wound management with Prophase soak as well as [employer], see wound orders below    Compression with Tubigrip    Do not wet wound, do not submerge in water   Counseled on importance of frequent elevation of leg and increase exercise/walking   Counseled on adequate protein intake, 3-4 servings per day   Counseled on smoking cessation   F/u arterial and vascular studies   Followup in 1 week or call sooner with questions or concerns    Subjective:   Pt is a 59 y o  Male with pmhx DAVID (on CPAP), Tobacco Abuse, COPD, HTN, CHF, CAD, HLD, Gout, Leukocytosis, Edema of both lower extremities who presents for initial eval of non-healing LLE chronic venous ulcer which has been present since early September 2021  Pt believes wound initially developed after poison ivy exposure in which he was scratching his leg and caused several open wounds to his left leg  He was treating the wounds with hydrogen peroxide until they got infected  On 9/14/21 pt was evaluated and treated with PO cephalexin at [facility] ER 10/2/21 for LLE cellulitis   Pt returned to ED on 10/2/21 with worsening cellulitis and refused hospital admission but was given one dose of IV Keflex and discharged on PO clindamycin for another 7 days  No acute osseous abnormality were seen on LLE Xray  LLE wound did not improve and pt was started on Bactrim by PCP on 12/16/21  Pt did not f/u with wound care as directedand had been applying neosporin with a dry gauze dressing daily  Pt was seen by podiatry on 12/20/21 and advised to wear compression stockings, f/u with wound care and given referral for arterial study  Pt did not comply with recommendations until today   Pt states he has significant drainage and has been applying an ABD to wound bed and wrapping with angie  No diabetes  Pt denies any sob, fatigue, N/V, CP, fever or chills        The following portions of the patient's history were reviewed and updated as appropriate:   Patient Active Problem List   Diagnosis    Acute respiratory failure with hypoxia and hypercapnia (HCC)    Hypertension    Tobacco use    DAVID (obstructive sleep apnea)    Elevated troponin    S/P cardiac cath    CAD (coronary artery disease)    COPD (chronic obstructive pulmonary disease) (HCC)    Community acquired pneumonia    Gout    Hyperlipidemia    Leukocytosis    Chronic respiratory failure with hypoxia (HCC)    Edema of both lower extremities    Periodic limb movements of sleep     Past Medical History:   Diagnosis Date    CAD (coronary artery disease)     COPD (chronic obstructive pulmonary disease) ([facility] [address] )     Gout     Hyperlipidemia     Hypertension     Orthopnea     last assessed: 8/17/16    Pericardial effusion     Sleep apnea      Past Surgical History:   Procedure Laterality Date    CARDIAC CATHETERIZATION       Family History   Problem Relation Age of Onset    Diabetes Father     Hypertension Mother       Social History     Socioeconomic History    Marital status: Single     Spouse name: None    Number of children: 3    Years of education: None    Highest education level: None   Occupational History    Occupation: Unemployed, not looking for work   Tobacco Use    Smoking status: Current Every Day Smoker     Packs/day: 0 20     Years: 46 00     Pack years: 9 20     Types: Cigarettes     Last attempt to quit: 2015     Years since quittin 9    Smokeless tobacco: Never Used    Tobacco comment: 1-2 cigarette a day   Vaping Use    Vaping Use: Never used   Substance and Sexual Activity    Alcohol use: No     Comment: quit 3 years ago   Drug use: No    Sexual activity: Never     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  Other Topics Concern    None   Social History Narrative     as per [employer]     Social Determinants of Health     Financial Resource Strain: Low Risk     Difficulty of Paying Living Expenses: Not hard at all   Food Insecurity: No Food Insecurity    Worried About Running Out of Food in the Last Year: Never true    To of Food in the Last Year: Never true   Transportation Needs: No Transportation Needs    Lack of Transportation (Medical): No    Lack of Transportation (Non-Medical): No   Physical Activity: Sufficiently Active    Days of Exercise per Week: 7 days    Minutes of Exercise per Session: 30 min   Stress: No Stress Concern Present    Feeling of Stress : Not at all   Social Connections:  Moderately Isolated    Frequency of Communication with Friends and Family: Twice a week    Frequency of Social Gatherings with Friends and Family: Once a week    Attends Temple Services: Never    Active Member of Clubs or Organizations: No    Attends Club or Organization Meetings: Never    Marital Status: Living with partner   Intimate Partner Violence: Not At Risk    Fear of Current or Ex-Partner: No    Emotionally Abused: No    Physically Abused: No    Sexually Abused: No   Housing Stability: [address] Unable to Pay for Housing in the Last Year: No    Number of [facility] in the Last Year: 1    Unstable Housing in the Last Year: No        Current Outpatient Medications:     amLODIPine (NORVASC) 10 mg tablet, Take 1 tablet (10 mg total) by mouth daily, Disp: 90 tablet, Rfl: 0    aspirin (ECOTRIN LOW STRENGTH) 81 mg EC tablet, Take 1 tablet (81 mg total) by mouth daily, Disp: 90 tablet, Rfl: 6    atorvastatin (LIPITOR) 40 mg tablet, Take 1 tablet (40 mg total) by mouth daily at bedtime, Disp: 90 tablet, Rfl: 6    carvedilol (Coreg) 3 125 mg tablet, Take 1 tablet (3 125 mg total) by mouth 2 (two) times a day with meals, Disp: 180 tablet, Rfl: 3    CVS ALLERGY RELIEF 10 MG tablet, , Disp: , Rfl:     doxazosin (CARDURA) 4 mg tablet, Take 1 tablet (4 mg total) by mouth daily, Disp: 90 tablet, Rfl: 6    fluticasone-vilanterol (BREO ELLIPTA) 200-25 MCG/INH inhaler, Inhale 1 puff daily Rinse mouth after use , Disp: 180 blister, Rfl: 6    losartan (COZAAR) 100 MG tablet, Take 1 tablet (100 mg total) by mouth daily, Disp: 90 tablet, Rfl: 6    spironolactone (ALDACTONE) 25 mg tablet, Take 1 tablet (25 mg total) by mouth daily, Disp: 60 tablet, Rfl: 0    tiotropium (SPIRIVA RESPIMAT) 2 5 MCG/ACT AERS inhaler, Inhale 2 puffs daily, Disp: 12 g, Rfl: 6    albuterol (PROVENTIL HFA,VENTOLIN HFA) 90 mcg/act inhaler, Inhale 2 puffs every 6 (six) hours as needed for wheezing, Disp: 18 g, Rfl: 5    furosemide (LASIX) 40 mg tablet, Take 1 tablet (40 mg total) by mouth 2 (two) times a day, Disp: 180 tablet, Rfl: 5    oxyCODONE-acetaminophen (Percocet) 5-325 mg per tablet, Take 1 tablet by mouth every 6 (six) hours as needed for moderate pain Max Daily Amount: 4 tablets (Patient not taking: Reported on 5/25/2022), Disp: 20 tablet, Rfl: 0  No current facility-administered medications for this visit  Review of Systems   Constitutional: Negative for appetite change, chills, fatigue, fever and unexpected weight change  HENT: Negative for congestion, hearing loss, postnasal drip and sinus pressure  Eyes: Negative for discharge and visual disturbance  Respiratory: Negative for cough and shortness of breath  Cardiovascular: Positive for leg swelling  Negative for chest pain and palpitations  Gastrointestinal: Negative for abdominal pain, blood in stool, constipation, diarrhea and nausea  Endocrine: Negative  Genitourinary: Negative for difficulty urinating, dysuria and urgency  Musculoskeletal: Negative for back pain  Skin: Positive for wound (LLE)  Negative for rash  Allergic/Immunologic: Negative  Neurological: Negative for dizziness, tremors, seizures, weakness, numbness and headaches  Hematological: Does not bruise/bleed easily  Psychiatric/Behavioral: Negative  Negative for dysphoric mood  The patient is not nervous/anxious  Objective:  BP (!) 176/85   Pulse 64   Temp (!) 97 3 °F (36 3 °C)   Resp 18   Pain Score:   6     Physical Exam  Vitals and nursing note reviewed  Constitutional:       General: He is not in acute distress  Appearance: Normal appearance  He is well-developed  He is obese  HENT:      Head: Normocephalic and atraumatic  Right Ear: External ear normal       Left Ear: External ear normal       Mouth/Throat:      Comments: masked  Eyes:      General: Lids are normal          Right eye: No discharge  Left eye: No discharge  Extraocular Movements: Extraocular movements intact  Conjunctiva/sclera: Conjunctivae normal       Pupils: Pupils are equal, round, and reactive to light  Cardiovascular:      Rate and Rhythm: Normal rate  Pulses: Normal pulses  Heart sounds: Normal heart sounds  No murmur heard  No friction rub  No gallop  Pulmonary:      Effort: Pulmonary effort is normal       Breath sounds: Normal breath sounds and air entry  Abdominal:      General: Abdomen is flat  Palpations: Abdomen is soft  Tenderness: There is no abdominal tenderness  There is no guarding or rebound  Musculoskeletal:         General: No deformity  Cervical back: Normal range of motion  Left lower leg: Edema present  Skin:     General: Skin is warm and dry  Capillary Refill: Capillary refill takes less than 2 seconds  Findings: Wound (LLE) present  Comments: + beefy red wound bed with adherent slough and biofilm on wound bed  Hyperpigmented periwound  See wound assessment   Neurological:      General: No focal deficit present  Mental Status: He is alert and oriented to person, place, and time  Gait: Gait is intact  Psychiatric:         Mood and Affect: Mood and affect normal          Speech: Speech normal          Behavior: Behavior normal  Behavior is cooperative  Thought Content: Thought content normal          Judgment: Judgment normal                    Wound 05/25/22 Venous Ulcer Leg Left; Lower (Active)   Wound Image Images linked 05/25/22 1028   Wound Description Pink;Yellow;Slough;Granulation tissue; Epithelialization 05/25/22 1000   Mari-wound Assessment Edema; Erythema 05/25/22 1000   Wound Length (cm) 15 9 cm 05/25/22 1000   Wound Width (cm) 14 5 cm 05/25/22 1000   Wound Depth (cm) 0 2 cm 05/25/22 1000   Wound Surface Area (cm^2) 230 55 cm^2 05/25/22 1000   Wound Volume (cm^3) 46 11 cm^3 05/25/22 1000   Calculated Wound Volume (cm^3) 46 11 cm^3 05/25/22 1000   Drainage Amount Large 05/25/22 1000   Drainage Description Serosanguineous 05/25/22 1000   Non-staged Wound Description Full thickness 05/25/22 1000   Patient Tolerance Tolerated well 05/25/22 1000   Dressing Status Removed 05/25/22 1000       Debridement   Wound 05/25/22 Venous Ulcer Leg Left; Lower    Universal Protocol:  Consent: Verbal consent obtained  Written consent obtained    Risks and benefits: risks, benefits and alternatives were discussed  Consent given by: patient  Time out: Immediately prior to procedure a "time out" was called to verify the correct patient, procedure, equipment, support staff and site/side marked as required  Patient understanding: patient states understanding of the procedure being performed  Patient identity confirmed: verbally with patient      Performed by: PA  Debridement type: surgical  Level of debridement: subcutaneous tissue  Pain control: lidocaine 4%  Post-debridement measurements  Length (cm): 15 9  Width (cm): 14 5  Depth (cm): 0 3  Percent debrided: 40%  Surface Area (cm^2): 230 55  Area debrided (cm^2): 92 22  Volume (cm^3): 69 17  Tissue and other material debrided: subcutaneous tissue  Devitalized tissue debrided: biofilm, exudate, fibrin and slough  Instrument(s) utilized: curette  Bleeding: small  Hemostasis obtained with: pressure  Procedural pain (0-10): 6  Post-procedural pain: 4   Response to treatment: procedure was tolerated well                     Wound Instructions:  Orders Placed This Encounter   Procedures    Wound cleansing and dressings     Wash your hands with soap and water  Remove old dressing, discard into plastic bag and place in trash  Cleanse the wound with prophase prior to applying a clean dressing  Do not use tissue or cotton balls  Do not scrub the wound  Pat dry using gauze  Shower YES   Left Leg Wound:  Apply opticell ag to the wound  Cover with ABD or baby diapers  Secure with kerlix and tape  Change dressing 3x/week  Apply spandagrip from the base of the toes to below the knee    Loose fitting clothes  Consume 3-4 servings of protein daily  Follow up in 1 week     Standing Status:   Future     Standing Expiration Date:   5/25/2023       Total time spent today:  30 minutes    This includes reviewing the patient's chart, pertinent physician records Dr Bergman Internal Medicine, 5/2/22, Dr Elizabeth Carcamo, Internal Medicine 4/19/22, Dr Dania Soriano, Emergency Medicine, 9/14/2, Dr Tiffanie Nobles, Internal Medicine, 8/17/21, Dr Amarilys Hyde, Emergency Medicine 9/14/21, Dr Brii Ferrera, Emergency Medicine, 10/2/21, Dr Krista Chauhan, Internal Medicine, 12/16/21, Dr Mikaela Clancy, [facility] [address], 12/20/21, and Xray 10, 2/21      Fonda Goodpasture, PA-C, [facility]    Portions of the record may have been created with voice recognition software  Occasional wrong word or "sound alike" substitutions may have occurred due to the inherent limitations of voice recognition software  Read the chart carefully and recognize, using context, where substitutions have occurred

## 2022-05-25 NOTE — PATIENT INSTRUCTIONS
Orders Placed This Encounter   Procedures    Wound cleansing and dressings     Wash your hands with soap and water  Remove old dressing, discard into plastic bag and place in trash  Cleanse the wound with prophase prior to applying a clean dressing  Do not use tissue or cotton balls  Do not scrub the wound  Pat dry using gauze  Shower YES   Left Leg Wound:  Apply opticell ag to the wound    Cover with ABD or baby diapers  Secure with kerlix and tape  Change dressing 3x/week  Apply spandagrip from the base of the toes to below the knee    Loose fitting clothes  Consume 3-4 servings of protein daily  Follow up in 1 week     Standing Status:   Future     Standing Expiration Date:   5/25/2023

## 2022-06-10 ENCOUNTER — HOSPITAL ENCOUNTER (OUTPATIENT)
Dept: NON INVASIVE DIAGNOSTICS | Facility: CLINIC | Age: 62
Discharge: HOME | End: 2022-06-10
Payer: COMMERCIAL

## 2022-06-10 DIAGNOSIS — I87.332 CHRONIC VENOUS HYPERTENSION (IDIOPATHIC) WITH ULCER AND INFLAMMATION OF LEFT LOWER EXTREMITY (HCC): ICD-10-CM

## 2022-06-10 DIAGNOSIS — L97.929 CHRONIC VENOUS HYPERTENSION (IDIOPATHIC) WITH ULCER AND INFLAMMATION OF LEFT LOWER EXTREMITY (HCC): ICD-10-CM

## 2022-06-10 PROCEDURE — 93925 LOWER EXTREMITY STUDY: CPT

## 2022-06-10 PROCEDURE — 93922 UPR/L XTREMITY ART 2 LEVELS: CPT | Performed by: SURGERY

## 2022-06-10 PROCEDURE — 93923 UPR/LXTR ART STDY 3+ LVLS: CPT

## 2022-06-10 PROCEDURE — 93970 EXTREMITY STUDY: CPT

## 2022-06-10 PROCEDURE — 93970 EXTREMITY STUDY: CPT | Performed by: SURGERY

## 2022-06-10 PROCEDURE — 93925 LOWER EXTREMITY STUDY: CPT | Performed by: SURGERY

## 2022-06-16 ENCOUNTER — OFFICE VISIT (OUTPATIENT)
Dept: WOUND CARE | Facility: HOSPITAL | Age: 62
End: 2022-06-16
Payer: COMMERCIAL

## 2022-06-16 VITALS
DIASTOLIC BLOOD PRESSURE: 74 MMHG | TEMPERATURE: 97.3 F | RESPIRATION RATE: 18 BRPM | SYSTOLIC BLOOD PRESSURE: 169 MMHG | HEART RATE: 74 BPM

## 2022-06-16 DIAGNOSIS — R60.0 EDEMA OF BOTH LOWER EXTREMITIES: ICD-10-CM

## 2022-06-16 DIAGNOSIS — Z72.0 TOBACCO USE: ICD-10-CM

## 2022-06-16 DIAGNOSIS — L97.929 CHRONIC VENOUS HYPERTENSION (IDIOPATHIC) WITH ULCER AND INFLAMMATION OF LEFT LOWER EXTREMITY (HCC): ICD-10-CM

## 2022-06-16 DIAGNOSIS — I87.332 CHRONIC VENOUS HYPERTENSION (IDIOPATHIC) WITH ULCER AND INFLAMMATION OF LEFT LOWER EXTREMITY (HCC): ICD-10-CM

## 2022-06-16 PROCEDURE — 11045 DBRDMT SUBQ TISS EACH ADDL: CPT | Performed by: ORTHOPAEDIC SURGERY

## 2022-06-16 PROCEDURE — 11042 DBRDMT SUBQ TIS 1ST 20SQCM/<: CPT | Performed by: ORTHOPAEDIC SURGERY

## 2022-06-16 PROCEDURE — 99214 OFFICE O/P EST MOD 30 MIN: CPT | Performed by: ORTHOPAEDIC SURGERY

## 2022-06-16 RX ORDER — SODIUM HYPOCHLORITE 2.5 MG/ML
1 SOLUTION TOPICAL ONCE
Qty: 473 ML | Refills: 0 | Status: SHIPPED | OUTPATIENT
Start: 2022-06-16 | End: 2022-06-16

## 2022-06-16 RX ORDER — LEVOFLOXACIN 500 MG/1
500 TABLET, FILM COATED ORAL EVERY 24 HOURS
Qty: 10 TABLET | Refills: 0 | Status: SHIPPED | OUTPATIENT
Start: 2022-06-16 | End: 2022-06-26

## 2022-06-16 RX ORDER — LIDOCAINE 40 MG/G
CREAM TOPICAL ONCE
Status: COMPLETED | OUTPATIENT
Start: 2022-06-16 | End: 2022-06-16

## 2022-06-16 RX ORDER — LEVOFLOXACIN 500 MG/1
500 TABLET, FILM COATED ORAL EVERY 24 HOURS
Qty: 10 TABLET | Refills: 0 | Status: SHIPPED | OUTPATIENT
Start: 2022-06-16 | End: 2022-06-16

## 2022-06-16 RX ADMIN — LIDOCAINE 1 APPLICATION: 40 CREAM TOPICAL at 13:34

## 2022-06-16 NOTE — PROGRESS NOTES
Patient ID: Devonte Aguayo is a 59 y o  male Date of Birth 1960       Chief Complaint   Patient presents with    Follow Up Wound Care Visit     LLE wound       Allergies:  Patient has no known allergies  Diagnosis:   Diagnosis ICD-10-CM Associated Orders   1  Chronic venous hypertension (idiopathic) with ulcer and inflammation of left lower extremity (HCC)  I87 332 lidocaine (LMX) 4 % cream    L97 929 Wound cleansing and dressings     Wound compression and edema control     Wound miscellaneous orders     levofloxacin (LEVAQUIN) 500 mg tablet     sodium hypochlorite (DAKIN'S HALF-STRENGTH) external solution   2  Tobacco use  Z72 0 Wound cleansing and dressings     Wound compression and edema control     Wound miscellaneous orders   3  Edema of both lower extremities  R60 0 Wound cleansing and dressings     Wound compression and edema control     Wound miscellaneous orders        Assessment and Plan :  · LLE Venous Ulcer malodorous yellow-green drainage with erythema and  signs of infection today  Prescribed Levofloxacin for 10 days  · Debrided as below  · Wound management with Prophase soak until Dakins arrives as well as Aquacel Ag Advantage, see wound orders below   · Continue compression with Tubigrip   · Do not wet wound, do not submerge in water  · Counseled on importance of frequent elevation of leg and increase exercise/walking  · Counseled on adequate protein intake, 3-4 servings per day  · Counseled on smoking cessation  · F/u with vascular surgery  · Followup in 1 week or call sooner with questions or concerns      Subjective:   5/25/22: Pt is a 59 y o  Male with pmhx DAVID (on CPAP), Tobacco Abuse, COPD, HTN, CHF, CAD, HLD, Gout, Leukocytosis, Edema of both lower extremities who presents for initial eval of non-healing LLE chronic venous ulcer which has been present since early September 2021    Pt believes wound initially developed after poison ivy exposure in which he was scratching his leg and caused several open wounds to his left leg  He was treating the wounds with hydrogen peroxide until they got infected  On 9/14/21 pt was evaluated and treated with PO cephalexin at [facility] ER 10/2/21 for LLE cellulitis  Pt returned to ED on 10/2/21 with worsening cellulitis and refused hospital admission but was given one dose of IV Keflex and discharged on PO clindamycin for another 7 days  No acute osseous abnormality were seen on LLE Xray  LLE wound did not improve and pt was started on Bactrim by PCP on 12/16/21  Pt did not f/u with wound care as directed and had been applying neosporin with a dry gauze dressing daily  Pt was seen by podiatry on 12/20/21 and advised to wear compression stockings, f/u with wound care and given referral for arterial study  Pt did not comply with recommendations until today   Pt states he has significant drainage and has been applying an ABD to wound bed and wrapping with angie  No diabetes  Pt denies any sob, fatigue, N/V, CP, fever or chills  6/16/22: Patient presents for followup evaluation of LLE venous ulcer   Pt c/o foul smelling odor and increased pain, yellow green drainage   Has been using Opticell Ag on the wound and Tubigrip for compression  F/u arterial and vascular studies reveal diffuse disease and incompetent veins  Patient is using her compression pumps twice daily  Pt denies any fever or chills          The following portions of the patient's history were reviewed and updated as appropriate:   Patient Active Problem List   Diagnosis    Acute respiratory failure with hypoxia and hypercapnia (HCC)    Hypertension    Tobacco use    DAVID (obstructive sleep apnea)    Elevated troponin    S/P cardiac cath    CAD (coronary artery disease)    COPD [facility]chronic obstructive pulmonary disease) ([facility] [address] )    Community acquired pneumonia    Gout    Hyperlipidemia    Leukocytosis    Chronic respiratory failure with hypoxia (HCC)    Edema of both lower extremities    Periodic limb movements of sleep     Past Medical History:   Diagnosis Date    CAD (coronary artery disease)     COPD [facility]chronic obstructive pulmonary disease) ([facility] [address] [facility]     Gout     Hyperlipidemia     Hypertension     Orthopnea     last assessed: 16    Pericardial effusion     Sleep apnea      Past Surgical History:   Procedure Laterality Date    CARDIAC CATHETERIZATION       Family History   Problem Relation Age of Onset    Diabetes Father     Hypertension Mother      Social History     Socioeconomic History    Marital status: Single     Spouse name: None    Number of children: 3    Years of education: None    Highest education level: None   Occupational History    Occupation: Unemployed, not looking for work   Tobacco Use    Smoking status: Current Every Day Smoker     Packs/day: 0 20     Years: 46 00     Pack years: 9 20     Types: Cigarettes     Last attempt to quit: 2015     Years since quittin 9    Smokeless tobacco: Never Used    Tobacco comment: 1-2 cigarette a day   Vaping Use    Vaping Use: Never used   Substance and Sexual Activity    Alcohol use: No     Comment: quit 3 years ago   Drug use: No    Sexual activity: Never     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[employer]  Other Topics Concern    None   [facility]     [facility]     Social Determinants of Health     Financial Resource Strain: Low Risk     Difficulty of Paying Living Expenses: Not hard at all   Food Insecurity: No Food Insecurity    Worried About Running Out of Food in the Last Year: Never true    To of Food in the Last Year: Never true   Transportation Needs: No Transportation Needs    Lack of Transportation (Medical): No    Lack of Transportation (Non-Medical):  No   Physical Activity: Sufficiently Active    Days of Exercise per Week: 7 days    Minutes of Exercise per Session: 30 min   Stress: No Stress Concern Present    Feeling of Stress : Not at all   Social Connections:  Moderately Isolated    Frequency of Communication with Friends and Family: Twice a week    Frequency of Social Gatherings with Friends and Family: Once a week    Attends Quaker Services: Never    Active Member of Clubs or Organizations: No    Attends Club or Organization Meetings: Never    Marital Status: Living with partner   Intimate Partner Violence: Not At Risk    Fear of Current or Ex-Partner: No    Emotionally Abused: No    Physically Abused: No    Sexually Abused: No   Housing Stability: [address] Unable to Pay for Housing in the Last Year: No    Number of [place] in the Last Year: 1    Unstable Housing in the Last Year: No       Current Outpatient Medications:     levofloxacin (LEVAQUIN) 500 mg tablet, Take 1 tablet (500 mg total) by mouth every 24 hours for 10 days, Disp: 10 tablet, Rfl: 0    sodium hypochlorite (DAKIN'S HALF-STRENGTH) external solution, Apply 1 application topically once for 1 dose, Disp: 473 mL, Rfl: 0    albuterol (PROVENTIL HFA,VENTOLIN HFA) 90 mcg/act inhaler, Inhale 2 puffs every 6 (six) hours as needed for wheezing, Disp: 18 g, Rfl: 5    amLODIPine (NORVASC) 10 mg tablet, Take 1 tablet (10 mg total) by mouth daily, Disp: 90 tablet, Rfl: 0    aspirin (ECOTRIN LOW STRENGTH) 81 mg EC tablet, Take 1 tablet (81 mg total) by mouth daily, Disp: 90 tablet, Rfl: 6    atorvastatin (LIPITOR) 40 mg tablet, Take 1 tablet (40 mg total) by mouth daily at bedtime, Disp: 90 tablet, Rfl: 6    carvedilol (Coreg) 3 125 mg tablet, Take 1 tablet (3 125 mg total) by mouth 2 (two) times a day with meals, Disp: 180 tablet, Rfl: 3    [employer] ALLERGY RELIEF 10 MG tablet, , Disp: , Rfl:     doxazosin (CARDURA) 4 mg tablet, Take 1 tablet (4 mg total) by mouth daily, Disp: 90 tablet, Rfl: 6    fluticasone-vilanterol (BREO ELLIPTA) 200-25 MCG/INH inhaler, Inhale 1 puff daily Rinse mouth after use , Disp: 180 blister, Rfl: 6   furosemide (LASIX) 40 mg tablet, Take 1 tablet (40 mg total) by mouth 2 (two) times a day, Disp: 180 tablet, Rfl: 5    losartan (COZAAR) 100 MG tablet, Take 1 tablet (100 mg total) by mouth daily, Disp: 90 tablet, Rfl: 6    oxyCODONE-acetaminophen (Percocet) 5-325 mg per tablet, Take 1 tablet by mouth every 6 (six) hours as needed for moderate pain Max Daily Amount: 4 tablets (Patient not taking: Reported on 5/25/2022), Disp: 20 tablet, Rfl: 0    spironolactone (ALDACTONE) 25 mg tablet, TAKE 1 TABLET BY MOUTH  DAILY, Disp: 90 tablet, Rfl: 3    tiotropium (SPIRIVA RESPIMAT) 2 5 MCG/ACT AERS inhaler, Inhale 2 puffs daily, Disp: 12 g, Rfl: 6  No current facility-administered medications for this visit  Review of Systems   Constitutional: Negative for appetite change, chills, fatigue, fever and unexpected weight change  HENT: Negative for congestion, hearing loss and postnasal drip  Respiratory: Negative for cough and shortness of breath  Cardiovascular: Positive for leg swelling  Skin: Positive for wound (LLE)  Negative for rash  Neurological: Negative for numbness  Hematological: Does not bruise/bleed easily  Psychiatric/Behavioral: Negative  Objective:  /74   Pulse 74   Temp (!) 97 3 °F (36 3 °C)   Resp 18   Pain Score:   4     Physical Exam  Vitals reviewed  Constitutional:       General: He is not in acute distress  Appearance: Normal appearance  He is well-developed  He is obese  HENT:      Head: Normocephalic and atraumatic  Cardiovascular:      Rate and Rhythm: Normal rate  Pulmonary:      Effort: Pulmonary effort is normal    Musculoskeletal:         General: No deformity  Left lower leg: Edema present  Skin:     General: Skin is warm and dry  Findings: Wound (LLE) present  Comments: Increased edema, warm to touch, yellow adherent slough and black necrotic tissue on wound bed with periwound erythema, moderate yellow-green drainage   See wound assessment   Neurological:      General: No focal deficit present  Mental Status: He is alert and oriented to person, place, and time  Gait: Gait normal    Psychiatric:         Mood and Affect: Mood and affect normal          Behavior: Behavior normal  Behavior is cooperative  Wound 05/25/22 Venous Ulcer Leg Left; Lower (Active)   Wound Description Pink;Yellow;Slough;Granulation tissue; Epithelialization;Black;Eschar 06/16/22 1329   Mari-wound Assessment Edema; Erythema 06/16/22 1329   Wound Length (cm) 15 5 cm 06/16/22 1329   Wound Width (cm) 13 5 cm 06/16/22 1329   Wound Depth (cm) 0 2 cm 06/16/22 1329   Wound Surface Area (cm^2) 209 25 cm^2 06/16/22 1329   Wound Volume (cm^3) 41 85 cm^3 06/16/22 1329   Calculated Wound Volume (cm^3) 41 85 cm^3 06/16/22 1329   Change in Wound Size % 9 24 06/16/22 1329   Drainage Amount Large 06/16/22 1329   Drainage Description Serosanguineous; Yellow;Green 06/16/22 1329   Non-staged Wound Description Full thickness 06/16/22 1329   Patient Tolerance Tolerated well 05/25/22 1000   Dressing Status New drainage; Intact 06/16/22 1329      Debridement   Wound 05/25/22 Venous Ulcer Leg Left; Lower    Universal Protocol:  Consent: Verbal consent obtained  Written consent obtained  Risks and benefits: risks, benefits and alternatives were discussed  Consent given by: patient  Time out: Immediately prior to procedure a "time out" was called to verify the correct patient, procedure, equipment, support staff and site/side marked as required    Patient understanding: patient states understanding of the procedure being performed  Patient identity confirmed: verbally with patient      Performed by: PA  Debridement type: surgical  Level of debridement: subcutaneous tissue  Pain control: lidocaine 4%  Post-debridement measurements  Length (cm): 15 5  Width (cm): 13 5  Depth (cm): 0 3  Percent debrided: 50%  Surface Area (cm^2): 209 25  Area debrided (cm^2): 104 63  Volume (cm^3): 62 78  Tissue and other material debrided: subcutaneous tissue  Devitalized tissue debrided: biofilm, exudate, fibrin, necrotic debris and slough  Instrument(s) utilized: curette  Bleeding: small  Hemostasis obtained with: pressure  Procedural pain (0-10): 0  Post-procedural pain: 0   Response to treatment: procedure was tolerated well                       Wound Instructions:  Orders Placed This Encounter   Procedures    Wound cleansing and dressings     Left leg wound:  Wash your hands with soap and water  Remove old dressing, discard into plastic bag and place in trash  Cleanse the wound with prophase prior to applying a clean dressing  Do not use tissue or cotton balls  Do not scrub the wound  Pat dry using gauze  Shower YES-  With protection  Do not get dressing wet  Cleanse wound with Dakin's solution  (Prophase used today in wound care)   Apply Aquacel AG advantage to the wound  (Britney Castles used in wound care)  Cover with ABD or baby diapers  Secure with kerlix and tape  Change dressing Daily  Apply spandagrip from the base of the toes to below the knee     Loose fitting clothes  Consume 3-4 servings of protein daily  Follow up in 1 week     Standing Status:   Future     Standing Expiration Date:   6/16/2023    Wound compression and edema control     Elastic Tubular Stocking - Spandagrip size F     Tubular elastic bandage: Apply from base of toes to behind the knee  Apply in AM, may remove for sleep  Avoid prolonged standing in one place  Elevate leg(s) above the level of the heart when sitting or as much as possible  Standing Status:   Future     Standing Expiration Date:   6/16/2023    Wound miscellaneous orders     Antibiotics ordered today  Consult Vascular MD     Standing Status:   Future     Standing Expiration Date:   6/16/2023       Danae Gonzalez PA-C, [facility]      Portions of the record may have been created with voice recognition software   Occasional wrong word or "sound alike" substitutions may have occurred due to the inherent limitations of voice recognition software  Read the chart carefully and recognize, using context, where substitutions have occurred

## 2022-06-16 NOTE — PATIENT INSTRUCTIONS
Orders Placed This Encounter   Procedures    Wound cleansing and dressings     Left leg wound:  Wash your hands with soap and water  Remove old dressing, discard into plastic bag and place in trash  Cleanse the wound with prophase prior to applying a clean dressing  Do not use tissue or cotton balls  Do not scrub the wound  Pat dry using gauze  Shower YES-  With protection  Do not get dressing wet  Cleanse wound with Dakin's solution  (Prophase used today in wound care)   Apply Aquacel AG advantage to the wound  ([place] used in wound care)  Cover with ABD or baby diapers  Secure with kerlix and tape  Change dressing Daily  Apply spandagrip from the base of the toes to below the knee     Loose fitting clothes  Consume 3-4 servings of protein daily  Follow up in 1 week     Standing Status:   Future     Standing Expiration Date:   6/16/2023    Wound compression and edema control     Elastic Tubular Stocking - Spandagrip size F     Tubular elastic bandage: Apply from base of toes to behind the knee  Apply in AM, may remove for sleep  Avoid prolonged standing in one place  Elevate leg(s) above the level of the heart when sitting or as much as possible  Standing Status:   Future     Standing Expiration Date:   6/16/2023    Wound miscellaneous orders     Antibiotics ordered today   Consult Vascular MD     Standing Status:   Future     Standing Expiration Date:   6/16/2023

## 2022-06-23 ENCOUNTER — OFFICE VISIT (OUTPATIENT)
Dept: WOUND CARE | Facility: HOSPITAL | Age: 62
End: 2022-06-23
Payer: COMMERCIAL

## 2022-06-23 VITALS
HEART RATE: 56 BPM | DIASTOLIC BLOOD PRESSURE: 93 MMHG | SYSTOLIC BLOOD PRESSURE: 150 MMHG | RESPIRATION RATE: 18 BRPM | TEMPERATURE: 97.4 F

## 2022-06-23 DIAGNOSIS — M79.605 PAIN AND SWELLING OF LEFT LOWER EXTREMITY: ICD-10-CM

## 2022-06-23 DIAGNOSIS — Z72.0 TOBACCO USE: ICD-10-CM

## 2022-06-23 DIAGNOSIS — R60.0 EDEMA OF BOTH LOWER EXTREMITIES: ICD-10-CM

## 2022-06-23 DIAGNOSIS — L97.929 CHRONIC VENOUS HYPERTENSION (IDIOPATHIC) WITH ULCER AND INFLAMMATION OF LEFT LOWER EXTREMITY (HCC): Primary | ICD-10-CM

## 2022-06-23 DIAGNOSIS — I87.332 CHRONIC VENOUS HYPERTENSION (IDIOPATHIC) WITH ULCER AND INFLAMMATION OF LEFT LOWER EXTREMITY (HCC): Primary | ICD-10-CM

## 2022-06-23 DIAGNOSIS — M79.89 PAIN AND SWELLING OF LEFT LOWER EXTREMITY: ICD-10-CM

## 2022-06-23 PROCEDURE — 11045 DBRDMT SUBQ TISS EACH ADDL: CPT | Performed by: ORTHOPAEDIC SURGERY

## 2022-06-23 PROCEDURE — 11042 DBRDMT SUBQ TIS 1ST 20SQCM/<: CPT | Performed by: ORTHOPAEDIC SURGERY

## 2022-06-23 PROCEDURE — 99214 OFFICE O/P EST MOD 30 MIN: CPT | Performed by: ORTHOPAEDIC SURGERY

## 2022-06-23 RX ORDER — LIDOCAINE 40 MG/G
CREAM TOPICAL ONCE
Status: COMPLETED | OUTPATIENT
Start: 2022-06-23 | End: 2022-06-23

## 2022-06-23 RX ORDER — TRAMADOL HYDROCHLORIDE 50 MG/1
50 TABLET ORAL EVERY 6 HOURS PRN
Qty: 30 TABLET | Refills: 0 | Status: SHIPPED | OUTPATIENT
Start: 2022-06-23

## 2022-06-23 RX ADMIN — LIDOCAINE: 40 CREAM TOPICAL at 14:00

## 2022-06-23 NOTE — PROGRESS NOTES
Patient ID: Angel Roach is a 59 y o  male Date of Birth 1960       Chief Complaint   Patient presents with    Follow Up Wound Care Visit     LLE wound, ROVER NOT WORKING UNABLE TO OBTAIN WOUND PHOTO       Allergies:  Patient has no known allergies  Diagnosis:   Diagnosis ICD-10-CM Associated Orders   1  Chronic venous hypertension (idiopathic) with ulcer and inflammation of left lower extremity (HCC)  I87 [id number] lidocaine (LMX) 4 % cream    L97 929 Ambulatory Referral to Vascular Surgery     Wound cleansing and dressings     Wound compression and edema control   2  Tobacco use  Z72 0 Ambulatory Referral to Vascular Surgery   3  Edema of both lower extremities  R60 0 Ambulatory Referral to Vascular Surgery   4  Pain in lateral left lower extremity  M79 605    5  Pain and swelling of left lower extremity  M79 605 traMADol (ULTRAM) 50 mg tablet    M79 89         Assessment and Plan :  · LLE Venous Ulcer malodorous yellow-green drainage with erythema and  signs of infection today  Prescribed Levofloxacin for 10 days  · Debrided as below  · Wound management with Prophase soak until Dakins arrives as well as Aquacel Ag Advantage, see wound orders below   · Continue Levofloxacin to completion  · Continue compression with Tubigrip   · Tramadol ordered for pain  · Do not wet wound, do not submerge in water  · Counseled on importance of frequent elevation of leg and increase exercise/walking  · Counseled on adequate protein intake, 3-4 servings per day  · Counseled on smoking cessation  · F/u with vascular surgery  · Followup in 1 week or call sooner with questions or concerns      Subjective:   5/25/22: Pt is a 59 y o  Male with pmhx DAVID (on CPAP), Tobacco Abuse, COPD, HTN, CHF, CAD, HLD, Gout, Leukocytosis, Edema of both lower extremities who presents for initial eval of non-healing LLE chronic venous ulcer which has been present since early September 2021    Pt believes wound initially developed after poison ivy exposure in which he was scratching his leg and caused several open wounds to his left leg  He was treating the wounds with hydrogen peroxide until they got infected  On 9/14/21 pt was evaluated and treated with PO cephalexin at [facility] 10/2/21 for LLE cellulitis  Pt returned to ED on 10/2/21 with worsening cellulitis and refused hospital admission but was given one dose of IV Keflex and discharged on PO clindamycin for another 7 days  No acute osseous abnormality were seen on LLE Xray  LLE wound did not improve and pt was started on Bactrim by PCP on 12/16/21  Pt did not f/u with wound care as directed and had been applying neosporin with a dry gauze dressing daily  Pt was seen by podiatry on 12/20/21 and advised to wear compression stockings, f/u with wound care and given referral for arterial study  Pt did not comply with recommendations until today   Pt states he has significant drainage and has been applying an ABD to wound bed and wrapping with angie  No diabetes  Pt denies any sob, fatigue, N/V, CP, fever or chills  6/16/22: Patient presents for followup evaluation of LLE venous ulcer   Pt c/o foul smelling odor and increased pain, yellow green drainage   Has been using Opticell Ag on the wound and Tubigrip for compression  F/u arterial and vascular studies reveal diffuse disease and incompetent veins  Pt denies any fever or chills  6/23/22: Pt presents for followup evaluation of LLE venous ulcer  Pt states he was in excruciating pain for 2 days after last in office debridement  Pt states drainage is non-odorous but continues to have serous drainage  Pt has been doing Dakins soak and applying Aquacel Ag advantage  Pt on Levofloxacin  Pt denies any sob, fatigue, N/V, CP, fever or chills        The following portions of the patient's history were reviewed and updated as appropriate:   Patient Active Problem List   Diagnosis    Acute respiratory failure with hypoxia and hypercapnia (HCC)    Hypertension    Tobacco use    DAVID (obstructive sleep apnea)    Elevated troponin    S/P cardiac cath    CAD (coronary artery disease)    COPD (chronic obstructive pulmonary disease) (HCC)    Community acquired pneumonia    Gout    Hyperlipidemia    Leukocytosis    Chronic respiratory failure with hypoxia (HCC)    Edema of both lower extremities    Periodic limb movements of sleep     Past Medical History:   Diagnosis Date    CAD (coronary artery disease)     COPD [facility]chronic obstructive pulmonary disease) ([facility] [address] )     Gout     Hyperlipidemia     Hypertension     Orthopnea     last assessed: 16    Pericardial effusion     Sleep apnea      Past Surgical History:   Procedure Laterality Date    CARDIAC CATHETERIZATION       Family History   Problem Relation Age of Onset    Diabetes Father     Hypertension Mother      Social History     Socioeconomic History    Marital status: Single     Spouse name: Not on file    Number of children: 3    Years of education: Not on file    Highest education level: Not on file   Occupational History    Occupation: Unemployed, not looking for work   Tobacco Use    Smoking status: Current Every Day Smoker     Packs/day: 0 20     Years: 46 00     Pack years: 9 20     Types: Cigarettes     Last attempt to quit: 2015     Years since quittin 9    Smokeless tobacco: Never Used    Tobacco comment: 1-2 cigarette a day   Vaping Use    Vaping Use: Never used   Substance and Sexual Activity    Alcohol use: No     Comment: quit 3 years ago   Drug use: No    Sexual activity: Never     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     Other Topics Concern    Not on file   [facility] N[facility]     [facility]     Social Determinants of Health     Financial Resource Strain: Low Risk     Difficulty of Paying Living Expenses: Not hard at all   Food Insecurity: No Food Insecurity    Worried About Running Out of Food in the Last Year: Never true    Ran Out of Food in the Last Year: Never true   Transportation Needs: No Transportation Needs    Lack of Transportation (Medical): No    Lack of Transportation (Non-Medical): No   Physical Activity: Sufficiently Active    Days of Exercise per Week: 7 days    Minutes of Exercise per Session: 30 min   Stress: No Stress Concern Present    Feeling of Stress : Not at all   Social Connections:  Moderately Isolated    Frequency of Communication with Friends and Family: Twice a week    Frequency of Social Gatherings with Friends and Family: Once a week    Attends Jehovah's witness Services: Never    Active Member of Clubs or Organizations: No    Attends Club or Organization Meetings: Never    Marital Status: Living with partner   Intimate Partner Violence: Not At Risk    Fear of Current or Ex-Partner: No    Emotionally Abused: No    Physically Abused: No    Sexually Abused: No   Housing Stability: [address] [place] [address] Unable to Pay for Housing in the Last Year: No    Number of [place] in the Last Year: 1    Unstable Housing in the Last Year: No       Current Outpatient Medications:     traMADol (ULTRAM) 50 mg tablet, Take 1 tablet (50 mg total) by mouth every 6 (six) hours as needed for moderate pain or severe pain, Disp: 30 tablet, Rfl: 0    albuterol (PROVENTIL HFA,VENTOLIN HFA) 90 mcg/act inhaler, Inhale 2 puffs every 6 (six) hours as needed for wheezing, Disp: 18 g, Rfl: 5    amLODIPine (NORVASC) 10 mg tablet, Take 1 tablet (10 mg total) by mouth daily, Disp: 90 tablet, Rfl: 0    aspirin (ECOTRIN LOW STRENGTH) 81 mg EC tablet, Take 1 tablet (81 mg total) by mouth daily, Disp: 90 tablet, Rfl: 6    atorvastatin (LIPITOR) 40 mg tablet, Take 1 tablet (40 mg total) by mouth daily at bedtime, Disp: 90 tablet, Rfl: 6    carvedilol (Coreg) 3 125 mg tablet, Take 1 tablet (3 125 mg total) by mouth 2 (two) times a day with meals, Disp: 180 tablet, Rfl: 3    [employer] ALLERGY RELIEF 10 MG tablet, , Disp: , Rfl:     doxazosin (CARDURA) 4 mg tablet, Take 1 tablet (4 mg total) by mouth daily, Disp: 90 tablet, Rfl: 6    fluticasone-vilanterol (BREO ELLIPTA) 200-25 MCG/INH inhaler, Inhale 1 puff daily Rinse mouth after use , Disp: 180 blister, Rfl: 6    furosemide (LASIX) 40 mg tablet, Take 1 tablet (40 mg total) by mouth 2 (two) times a day, Disp: 180 tablet, Rfl: 5    levofloxacin (LEVAQUIN) 500 mg tablet, Take 1 tablet (500 mg total) by mouth every 24 hours for 10 days, Disp: 10 tablet, Rfl: 0    losartan (COZAAR) 100 MG tablet, Take 1 tablet (100 mg total) by mouth daily, Disp: 90 tablet, Rfl: 6    oxyCODONE-acetaminophen (Percocet) 5-325 mg per tablet, Take 1 tablet by mouth every 6 (six) hours as needed for moderate pain Max Daily Amount: 4 tablets (Patient not taking: Reported on 5/25/2022), Disp: 20 tablet, Rfl: 0    spironolactone (ALDACTONE) 25 mg tablet, TAKE 1 TABLET BY MOUTH  DAILY, Disp: 90 tablet, Rfl: 3    tiotropium (SPIRIVA RESPIMAT) 2 5 MCG/ACT AERS inhaler, Inhale 2 puffs daily, Disp: 12 g, Rfl: 6  No current facility-administered medications for this visit  Review of Systems   Constitutional: Negative for appetite change, chills, fatigue, fever and unexpected weight change  HENT: Negative for congestion, hearing loss and postnasal drip  Respiratory: Negative for cough and shortness of breath  Cardiovascular: Positive for leg swelling  Skin: Positive for wound (LLE)  Negative for rash  Neurological: Negative for numbness  Hematological: Does not bruise/bleed easily  Psychiatric/Behavioral: Negative  Objective:  /93   Pulse 56   Temp (!) 97 4 °F (36 3 °C)   Resp 18         Physical Exam  Vitals reviewed  Constitutional:       General: He is not in acute distress  Appearance: Normal appearance  He is well-developed  He is obese  HENT:      Head: Normocephalic and atraumatic     Cardiovascular:      Rate and Rhythm: Normal rate  Pulmonary:      Effort: Pulmonary effort is normal    Musculoskeletal:         General: No deformity  Left lower leg: Edema present  Skin:     General: Skin is warm and dry  Findings: Wound (LLE) present  Comments: Edematous, warm to touch, mild erythema, yellow adherent slough and moderate yellow drainage  See wound assessment   Neurological:      General: No focal deficit present  Mental Status: He is alert and oriented to person, place, and time  Gait: Gait normal    Psychiatric:         Mood and Affect: Mood and affect normal          Behavior: Behavior normal  Behavior is cooperative  Wound 05/25/22 Venous Ulcer Leg Left; Lower (Active)   Wound Image    06/16/22 1357   Wound Description Pink;Yellow;Slough; Epithelialization 06/23/22 1358   Mari-wound Assessment Edema; Erythema;Dry 06/23/22 1358   Wound Length (cm) 16 5 cm 06/23/22 1358   Wound Width (cm) 13 cm 06/23/22 1358   Wound Depth (cm) 0 1 cm 06/23/22 1358   Wound Surface Area (cm^2) 214 5 cm^2 06/23/22 1358   Wound Volume (cm^3) 21 45 cm^3 06/23/22 1358   Calculated Wound Volume (cm^3) 21 45 cm^3 06/23/22 1358   Change in Wound Size % 53 48 06/23/22 1358   Drainage Amount Large 06/23/22 1358   Drainage Description Serosanguineous; Yellow 06/23/22 1358   Non-staged Wound Description Full thickness 06/23/22 1358   Patient Tolerance Tolerated well 05/25/22 1000   Dressing Status Intact 06/23/22 1358                          Debridement   Wound 05/25/22 Venous Ulcer Leg Left; Lower    Universal Protocol:  Consent: Verbal consent obtained  Written consent obtained  Risks and benefits: risks, benefits and alternatives were discussed  Consent given by: patient  Time out: Immediately prior to procedure a "time out" was called to verify the correct patient, procedure, equipment, support staff and site/side marked as required    Patient understanding: patient states understanding of the procedure being performed  Patient identity confirmed: verbally with patient      Performed by: PA  Debridement type: surgical  Level of debridement: subcutaneous tissue  Pain control: lidocaine 4%  Post-debridement measurements  Length (cm): 16 5  Width (cm): 13  Depth (cm): 0 2  Percent debrided: 50%  Surface Area (cm^2): 214 5  Area debrided (cm^2): 107 25  Volume (cm^3): 42 9  Tissue and other material debrided: subcutaneous tissue  Devitalized tissue debrided: biofilm, exudate, fibrin and slough  Instrument(s) utilized: curette  Bleeding: small  Hemostasis obtained with: pressure  Procedural pain (0-10): 0  Post-procedural pain: 0   Response to treatment: procedure was tolerated well                   Wound Instructions:  Orders Placed This Encounter   Procedures    Wound cleansing and dressings     Left leg wound:  Wound care daily  Do not get wet with shower water, use cast cover or sponge bathe  Cleanse wound with 1/4 stength Dakin's solution, allow to sit on gauze for 20 minutes (prophase given to use for the next 2 days)  Apply Aquacel AG advantage to the wound(opticell ag applied at wound center)  Cover with ABD or baby diapers  Secure with kerlix and tape    This was applied today            Standing Status:   Future     Standing Expiration Date:   6/23/2023    Wound compression and edema control     Elastic Tubular Stocking - Spandagrip size F      Tubular elastic bandage: Apply from base of toes to behind the knee  Apply in AM, may remove for sleep      Avoid prolonged standing in one place      Elevate leg(s) above the level of the heart when sitting or as much as possible  This was applied today       Standing Status:   Future     Standing Expiration Date:   6/23/2023    Ambulatory Referral to Vascular Surgery     Standing Status:   Future     Standing Expiration Date:   6/23/2023     Referral Priority:   Routine     Referral Type:   Consult - AMB     Referral Reason:   Specialty Services Required Requested Specialty:   Vascular Surgery     Number of Visits Requested:   1     Expiration Date:   6/23/2023       Ai Raymundo PA-C, [facility]      Portions of the record may have been created with voice recognition software  Occasional wrong word or "sound alike" substitutions may have occurred due to the inherent limitations of voice recognition software  Read the chart carefully and recognize, using context, where substitutions have occurred

## 2022-06-23 NOTE — PATIENT INSTRUCTIONS
Orders Placed This Encounter   Procedures    Wound cleansing and dressings     Left leg wound:  Wound care daily  Do not get wet with shower water, use cast cover or sponge bathe  Cleanse wound with 1/4 stength Dakin's solution, allow to sit on gauze for 20 minutes (prophase given to use for the next 2 days)  Apply Aquacel AG advantage to the wound(opticell ag applied at wound center)  Cover with ABD or baby diapers  Secure with kerlix and tape    This was applied today  Standing Status:   Future     Standing Expiration Date:   6/23/2023    Wound compression and edema control     Elastic Tubular Stocking - Spandagrip size F      Tubular elastic bandage: Apply from base of toes to behind the knee  Apply in AM, may remove for sleep  Avoid prolonged standing in one place  Elevate leg(s) above the level of the heart when sitting or as much as possible  This was applied today       Standing Status:   Future     Standing Expiration Date:   6/23/2023    Ambulatory Referral to Vascular Surgery     Standing Status:   Future     Standing Expiration Date:   6/23/2023     Referral Priority:   Routine     Referral Type:   Consult - AMB     Referral Reason:   Specialty Services Required     Requested Specialty:   Vascular Surgery     Number of Visits Requested:   1     Expiration Date:   6/23/2023

## 2022-06-26 DIAGNOSIS — I10 PRIMARY HYPERTENSION: ICD-10-CM

## 2022-06-27 RX ORDER — AMLODIPINE BESYLATE 10 MG/1
TABLET ORAL
Qty: 90 TABLET | Refills: 3 | Status: SHIPPED | OUTPATIENT
Start: 2022-06-27

## 2022-08-04 NOTE — PATIENT INSTRUCTIONS
Orders Placed This Encounter   Procedures    Wound cleansing and dressings     Left leg wound:  Wound care daily  Do not get wet with shower water, use cast cover or sponge bathe  Cleanse wound with 1/4 stength Dakin's solution---finish then may begin washing with soap and water--recommend using Dove- rinse well and pat dry  Apply Aquacel AG advantage to the wound(opticell ag applied at wound center)  Cover with ABD or baby diapers  Secure with kerlix and tape    Maxorb AG applied today at the [facility] followed by ABD Pad and kerlix    Will be placing an order for Santyl---if this is delivered to your house bring with you to your next wound care appointment     This was applied today  Standing Status:   Future     Standing Expiration Date:   8/4/2023    Wound compression and edema control     Elastic Tubular Stocking - Spandagrip size F      Tubular elastic bandage: Apply from base of toes to behind the knee  Apply in AM, may remove for sleep  Avoid prolonged standing in one place  Elevate leg(s) above the level of the heart when sitting or as much as possible       Standing Status:   Future     Standing Expiration Date:   8/4/2023

## 2022-08-04 NOTE — PROGRESS NOTES
Patient ID: Joseph Sanabria is a 59 y o  male Date of Birth 1960       Chief Complaint   Patient presents with    Follow Up Wound Care Visit     Left LE wound       Allergies:  Patient has no known allergies  Diagnosis:   Diagnosis ICD-10-CM Associated Orders   1  Chronic venous hypertension (idiopathic) with ulcer and inflammation of left lower extremity (HCC)  I87 332 lidocaine (LMX) 4 % cream    L97 929 Wound cleansing and dressings     Wound compression and edema control   2  Tobacco use  Z72 0    3  Edema of both lower extremities  R60 0    4  Pain and swelling of left lower extremity  M79 605     M79 89         Assessment and Plan :  · LLE Venous Ulcer improved with no signs of infection today     · Mechanically Debrided with Clorhexadine  · Continue wound management with Prophase soak until Dakins arrives as well as Aquacel Ag Advantage, see wound orders below   · Ordered Santyl and will go over application at next office visit if pt qualifies for it via insurance  · Continue compression with Tubigrip   · Do not wet wound, do not submerge in water  · Counseled on importance of frequent elevation of leg and increase exercise/walking  · Counseled on adequate protein intake, 3-4 servings per day  · Counseled on smoking cessation  · F/u with vascular surgery  · Followup in 1 week or call sooner with questions or concerns      Subjective:   5/25/22: Pt is a 59 y o  Male with pmhx DAVID (on CPAP), Tobacco Abuse, COPD, HTN, CHF, CAD, HLD, Gout, Leukocytosis, Edema of both lower extremities who presents for initial eval of non-healing LLE chronic venous ulcer which has been present since early September 2021  Pt believes wound initially developed after poison ivy exposure in which he was scratching his leg and caused several open wounds to his left leg  He was treating the wounds with hydrogen peroxide until they got infected   On 9/14/21 pt was evaluated and treated with PO cephalexin at [facility] 10/2/21 for LLE cellulitis  Pt returned to ED on 10/2/21 with worsening cellulitis and refused hospital admission but was given one dose of IV Keflex and discharged on PO clindamycin for another 7 days  No acute osseous abnormality were seen on LLE Xray  LLE wound did not improve and pt was started on Bactrim by PCP on 12/16/21  Pt did not f/u with wound care as directed and had been applying neosporin with a dry gauze dressing daily  Pt was seen by podiatry on 12/20/21 and advised to wear compression stockings, f/u with wound care and given referral for arterial study  Pt did not comply with recommendations until today   Pt states he has significant drainage and has been applying an ABD to wound bed and wrapping with angie  No diabetes  Pt denies any sob, fatigue, N/V, CP, fever or chills  6/16/22: Patient presents for followup evaluation of LLE venous ulcer   Pt c/o foul smelling odor and increased pain, yellow green drainage   Has been using Opticell Ag on the wound and Tubigrip for compression  F/u arterial and vascular studies reveal diffuse disease and incompetent veins  Pt denies any fever or chills  6/23/22: Pt presents for followup evaluation of LLE venous ulcer  Pt states he was in excruciating pain for 2 days after last in office debridement  Pt states drainage is non-odorous but continues to have serous drainage  Pt has been doing Dakins soak and applying Aquacel Ag advantage  Pt on Levofloxacin  Pt denies any sob, fatigue, N/V, CP, fever or chills  8/4/22:  Pt presents for followup evaluation of LLE venous ulcer  Pt completed Levofloxacin and states drainage decreased  Pt has been doing Dakins soak and applying Aquacel Ag advantage  Pt denies any fever or chills        The following portions of the patient's history were reviewed and updated as appropriate:   Patient Active Problem List   Diagnosis    Acute respiratory failure with hypoxia and hypercapnia ([address] )    Hypertension  Tobacco use    DAVID (obstructive sleep apnea)    Elevated troponin    S/P cardiac cath    CAD (coronary artery disease)    COPD [facility]chronic obstructive [facility] disease) ([facility]    Community acquired pneumonia    Gout    Hyperlipidemia    Leukocytosis    Chronic respiratory failure with hypoxia ([facility])    Edema of both lower extremities    Periodic limb movements of sleep     Past Medical History:   Diagnosis Date    CAD (coronary artery disease)     [facility] disease) [facility]     Gout     Hyperlipidemia     Hypertension     Orthopnea     last assessed: 16    Pericardial effusion     Sleep apnea      Past Surgical History:   Procedure Laterality Date    CARDIAC CATHETERIZATION       Family History   Problem Relation Age of Onset    Diabetes Father     Hypertension Mother      Social History     Socioeconomic History    Marital status: Single     Spouse name: Not on file    Number of children: 3    Years of education: Not on file    Highest education level: Not on file   Occupational History    Occupation: Unemployed, not looking for work   Tobacco Use    Smoking status: Current Every Day Smoker     Packs/day: 0 20     Years: 46 00     Pack years: 9 20     Types: Cigarettes     Last attempt to quit: 2015     Years since quittin 1    Smokeless tobacco: Never Used    Tobacco comment: 1-2 cigarette a day   Vaping Use    Vaping Use: Never used   Substance and Sexual Activity    Alcohol use: No     Comment: quit 3 years ago   Drug use: No    Sexual activity: Never     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     Other Topics Concern    Not on file   Social History Narrative     as per [employer]     Social Determinants of Health     Financial Resource Strain: Low Risk     Difficulty of Paying Living Expenses: Not hard at all   Food Insecurity: No Food Insecurity    Worried About [address] in the Last Year: Never true    Ran Out of Food in the Last Year: Never true   Transportation Needs: No Transportation Needs    Lack of Transportation (Medical): No    Lack of Transportation (Non-Medical): No   Physical Activity: Sufficiently Active    Days of Exercise per Week: 7 days    Minutes of Exercise per Session: 30 min   Stress: No Stress Concern Present    Feeling of Stress : Not at all   Social Connections:  Moderately Isolated    Frequency of Communication with Friends and Family: Twice a week    Frequency of Social Gatherings with Friends and Family: Once a week    Attends Confucianism Services: Never    Active Member of Clubs or Organizations: No    Attends Club or Organization Meetings: Never    Marital Status: Living with partner   Intimate Partner Violence: Not At Risk    Fear of Current or Ex-Partner: No    Emotionally Abused: No    Physically Abused: No    Sexually Abused: No   Housing Stability: [address] Unable to Pay for Housing in the Last Year: No    Number of [place] in the Last Year: 1    Unstable Housing in the Last Year: No       Current Outpatient Medications:     albuterol (PROVENTIL HFA,VENTOLIN HFA) 90 mcg/act inhaler, Inhale 2 puffs every 6 (six) hours as needed for wheezing, Disp: 18 g, Rfl: 5    amLODIPine (NORVASC) 10 mg tablet, TAKE 1 TABLET BY MOUTH  DAILY, Disp: 90 tablet, Rfl: 3    aspirin (ECOTRIN LOW STRENGTH) 81 mg EC tablet, Take 1 tablet (81 mg total) by mouth daily, Disp: 90 tablet, Rfl: 6    atorvastatin (LIPITOR) 40 mg tablet, Take 1 tablet (40 mg total) by mouth daily at bedtime, Disp: 90 tablet, Rfl: 6    carvedilol (Coreg) 3 125 mg tablet, Take 1 tablet (3 125 mg total) by mouth 2 (two) times a day with meals, Disp: 180 tablet, Rfl: 3    [employer] ALLERGY RELIEF 10 MG tablet, , Disp: , Rfl:     doxazosin (CARDURA) 4 mg tablet, Take 1 tablet (4 mg total) by mouth daily, Disp: 90 tablet, Rfl: 6    fluticasone-vilanterol (BREO ELLIPTA) 200-25 MCG/INH inhaler, Inhale 1 puff daily Rinse mouth after use , Disp: 180 blister, Rfl: 6    furosemide (LASIX) 40 mg tablet, Take 1 tablet (40 mg total) by mouth 2 (two) times a day, Disp: 180 tablet, Rfl: 5    losartan (COZAAR) 100 MG tablet, Take 1 tablet (100 mg total) by mouth daily, Disp: 90 tablet, Rfl: 6    oxyCODONE-acetaminophen (Percocet) 5-325 mg per tablet, Take 1 tablet by mouth every 6 (six) hours as needed for moderate pain Max Daily Amount: 4 tablets (Patient not taking: Reported on 5/25/2022), Disp: 20 tablet, Rfl: 0    spironolactone (ALDACTONE) 25 mg tablet, TAKE 1 TABLET BY MOUTH  DAILY, Disp: 90 tablet, Rfl: 3    tiotropium (SPIRIVA RESPIMAT) 2 5 MCG/ACT AERS inhaler, Inhale 2 puffs daily, Disp: 12 g, Rfl: 6    traMADol (ULTRAM) 50 mg tablet, Take 1 tablet (50 mg total) by mouth every 6 (six) hours as needed for moderate pain or severe pain, Disp: 30 tablet, Rfl: 0  No current facility-administered medications for this visit  Review of Systems   Constitutional: Negative for appetite change, chills, fatigue, fever and unexpected weight change  HENT: Negative for congestion, hearing loss and postnasal drip  Respiratory: Negative for cough and shortness of breath  Cardiovascular: Positive for leg swelling  Skin: Positive for wound (LLE)  Negative for rash  Neurological: Negative for numbness  Hematological: Does not bruise/bleed easily  Psychiatric/Behavioral: Negative  Objective:  /73   Pulse 68   Temp 97 6 °F (36 4 °C)   Resp 18   Pain Score: 0-No pain     Physical Exam  Vitals reviewed  Constitutional:       General: He is not in acute distress  Appearance: Normal appearance  He is well-developed  He is obese  HENT:      Head: Normocephalic and atraumatic  Cardiovascular:      Rate and Rhythm: Normal rate  Pulmonary:      Effort: Pulmonary effort is normal    Musculoskeletal:         General: No deformity  Left lower leg: Edema present     Skin: General: Skin is warm and dry  Findings: Wound (LLE) present  Comments: Serosanguinous drainage yellow adherent slough and dry scaly periwound  See wound assessment   Neurological:      General: No focal deficit present  Mental Status: He is alert and oriented to person, place, and time  Gait: Gait normal    Psychiatric:         Mood and Affect: Mood and affect normal          Behavior: Behavior normal  Behavior is cooperative  No pictures available, [place] is not working  Wound 05/25/22 Venous Ulcer Leg Left; Lower (Active)   Wound Image -- (No picture available--[place] not working) 08/04/22 1336   Wound Description Yellow;Epithelialization;Slough;Pink;Eschar 08/04/22 1336   Mari-wound Assessment Edema; Erythema;Dry;Scaly 08/04/22 1336   Wound Length (cm) 15 5 cm 08/04/22 1336   Wound Width (cm) 21 3 cm 08/04/22 1336   Wound Depth (cm) 0 2 cm 08/04/22 1336   Wound Surface Area (cm^2) 330 15 cm^2 08/04/22 1336   Wound Volume (cm^3) 66 03 cm^3 08/04/22 1336   Calculated Wound Volume (cm^3) 66 03 cm^3 08/04/22 1336   Change in Wound Size % -43 2 08/04/22 1336   Drainage Amount Moderate 08/04/22 1336   Drainage Description Serosanguineous 08/04/22 1336   Non-staged Wound Description Full thickness 08/04/22 1336   Dressing Status Intact 08/04/22 1336     Wound Instructions:  Orders Placed This Encounter   Procedures    Wound cleansing and dressings     Left leg wound:  Wound care daily    Do not get wet with shower water, use cast cover or sponge bathe       Cleanse wound with 1/4 stength Dakin's solution---finish then may begin washing with soap and water--recommend using Dove- rinse well and pat dry  Apply Aquacel AG advantage to the wound(opticell ag applied at wound center)  Cover with ABD or baby diapers  Secure with kerlix and tape    Maxorb AG applied today at the [facility] followed by ABD Pad and kerlix    Will be placing an order for Santyl---if this is delivered to your house bring with you to your next wound care appointment     This was applied today                        Standing Status:   Future     Standing Expiration Date:   8/4/2023    Wound compression and edema control     Elastic Tubular Stocking - Spandagrip size F      Tubular elastic bandage: Apply from base of toes to behind the knee  Apply in AM, may remove for sleep      Avoid prolonged standing in one place      Elevate leg(s) above the level of the heart when sitting or as much as possible  Standing Status:   Future     Standing Expiration Date:   8/4/2023       Elza Pillai PA-C, [facility]      Portions of the record may have been created with voice recognition software  Occasional wrong word or "sound alike" substitutions may have occurred due to the inherent limitations of voice recognition software  Read the chart carefully and recognize, using context, where substitutions have occurred

## 2022-08-11 NOTE — TELEPHONE ENCOUNTER
Call to patient to make aware that he make continue using the Dakins soaks prior to wound care and begin using the Santyl since he has obtained same  Instructed the patient on the proper use of santyl applying nickel thick to the wound and then covering with gauze, angie and tape and changing daily  Patient verbalized understanding

## 2022-08-22 ENCOUNTER — TELEPHONE (OUTPATIENT)
Dept: INTERNAL MEDICINE CLINIC | Facility: CLINIC | Age: 62
End: 2022-08-22

## 2022-08-22 NOTE — TELEPHONE ENCOUNTER
Wilfredo Zelaya from [place] 's office [phone] called - wants to go over the medical history of Ana Rodriguez [birth year]    Phinolvia Alvarez was found 8/21/22  passed away sometime during the night between Saturday 8/20/22 and Sunday 8/21/22       Wilfredo Zelaya will want to speak to PCP to get permission to sign the death certificate

## 2022-08-22 NOTE — TELEPHONE ENCOUNTER
Lola Noel from [address] [place] [address] called in to get our fax number to fax the death certificate to have an attending fill it out  Fax number was confirmed [phone]  Lola Noel can be reached at [phone] with any further questions or concerns  Centralized faxing was also called and a message was left so they can expedite the form and send it over asap

## 2022-08-23 NOTE — TELEPHONE ENCOUNTER
Folder Color- Red    Name of Form- Medical Certification Worksheet     Form to be filled out by- Dr Yuliet Lacey to be Faxed [phone]

## 2022-08-23 NOTE — TELEPHONE ENCOUNTER
Kian Kimble,  from  [facility] called for an update of form  Informed Kain Kimble form was received today and will be given to the Doctor to complete and will be faxed when completed

## 2022-09-26 DIAGNOSIS — J44.9 CHRONIC OBSTRUCTIVE PULMONARY DISEASE, UNSPECIFIED COPD TYPE (HCC): ICD-10-CM

## 2022-09-26 RX ORDER — ALBUTEROL SULFATE 90 UG/1
AEROSOL, METERED RESPIRATORY (INHALATION)
Qty: 34 G | Refills: 3 | OUTPATIENT
Start: 2022-09-26

## 2022-09-27 DIAGNOSIS — J44.9 CHRONIC OBSTRUCTIVE PULMONARY DISEASE, UNSPECIFIED COPD TYPE (HCC): ICD-10-CM

## 2022-09-27 DIAGNOSIS — I50.32 CHRONIC DIASTOLIC HEART FAILURE (HCC): ICD-10-CM

## 2022-09-27 RX ORDER — FUROSEMIDE 40 MG/1
TABLET ORAL
Qty: 180 TABLET | Refills: 3 | OUTPATIENT
Start: 2022-09-27